# Patient Record
Sex: FEMALE | Race: BLACK OR AFRICAN AMERICAN | Employment: FULL TIME | URBAN - METROPOLITAN AREA
[De-identification: names, ages, dates, MRNs, and addresses within clinical notes are randomized per-mention and may not be internally consistent; named-entity substitution may affect disease eponyms.]

---

## 2017-12-30 ENCOUNTER — APPOINTMENT (OUTPATIENT)
Dept: CT IMAGING | Age: 42
End: 2017-12-30
Attending: EMERGENCY MEDICINE
Payer: COMMERCIAL

## 2017-12-30 ENCOUNTER — HOSPITAL ENCOUNTER (EMERGENCY)
Age: 42
Discharge: HOME | End: 2017-12-30
Attending: EMERGENCY MEDICINE
Payer: COMMERCIAL

## 2017-12-30 VITALS
HEIGHT: 64 IN | TEMPERATURE: 98 F | WEIGHT: 210 LBS | DIASTOLIC BLOOD PRESSURE: 65 MMHG | RESPIRATION RATE: 18 BRPM | OXYGEN SATURATION: 99 % | SYSTOLIC BLOOD PRESSURE: 129 MMHG | HEART RATE: 74 BPM | BODY MASS INDEX: 35.85 KG/M2

## 2017-12-30 DIAGNOSIS — R51.9 NONINTRACTABLE HEADACHE, UNSPECIFIED CHRONICITY PATTERN, UNSPECIFIED HEADACHE TYPE: Primary | ICD-10-CM

## 2017-12-30 DIAGNOSIS — J01.90 ACUTE SINUSITIS, RECURRENCE NOT SPECIFIED, UNSPECIFIED LOCATION: ICD-10-CM

## 2017-12-30 DIAGNOSIS — S16.1XXA STRAIN OF NECK MUSCLE, INITIAL ENCOUNTER: ICD-10-CM

## 2017-12-30 PROCEDURE — 99284 EMERGENCY DEPT VISIT MOD MDM: CPT

## 2017-12-30 PROCEDURE — 72125 CT NECK SPINE W/O DYE: CPT | Performed by: EMERGENCY MEDICINE

## 2017-12-30 PROCEDURE — 70450 CT HEAD/BRAIN W/O DYE: CPT | Performed by: EMERGENCY MEDICINE

## 2017-12-30 PROCEDURE — 96372 THER/PROPH/DIAG INJ SC/IM: CPT

## 2017-12-30 RX ORDER — CYCLOBENZAPRINE HCL 10 MG
10 TABLET ORAL 3 TIMES DAILY PRN
Qty: 20 TABLET | Refills: 0 | Status: SHIPPED | OUTPATIENT
Start: 2017-12-30 | End: 2018-01-06

## 2017-12-30 RX ORDER — KETOROLAC TROMETHAMINE 30 MG/ML
60 INJECTION, SOLUTION INTRAMUSCULAR; INTRAVENOUS ONCE
Status: COMPLETED | OUTPATIENT
Start: 2017-12-30 | End: 2017-12-30

## 2017-12-30 RX ORDER — FLUTICASONE PROPIONATE 50 MCG
2 SPRAY, SUSPENSION (ML) NASAL DAILY
Qty: 16 G | Refills: 0 | Status: SHIPPED | OUTPATIENT
Start: 2017-12-30 | End: 2018-01-29

## 2017-12-30 RX ORDER — TRAMADOL HYDROCHLORIDE 50 MG/1
50 TABLET ORAL EVERY 4 HOURS PRN
Qty: 15 TABLET | Refills: 0 | Status: SHIPPED | OUTPATIENT
Start: 2017-12-30 | End: 2018-01-06

## 2017-12-30 RX ORDER — AMOXICILLIN 500 MG/1
500 TABLET, FILM COATED ORAL 3 TIMES DAILY
Qty: 30 TABLET | Refills: 0 | Status: SHIPPED | OUTPATIENT
Start: 2017-12-30 | End: 2018-01-09

## 2017-12-30 NOTE — ED INITIAL ASSESSMENT (HPI)
Pt reports headache for one day with sinus congestion/pressure for 2 days with intermittent dizziness.

## 2017-12-30 NOTE — ED PROVIDER NOTES
Patient Seen in: THE [facility] Emergency Department In [place]    History   Patient presents with:  Headache (neurologic)    Stated Complaint:     HPI    This is a 64-year-old female who arrives here with complaints of left-sided facial, headache.   The patie HPI.  Constitutional and vital signs reviewed. All other systems reviewed and negative except as noted above.     Physical Exam   ED Triage Vitals [12/30/17 5533]  BP: 132/72  Pulse: 91  Resp: 20  Temp: 97.8 °F (36.6 °C)  Temp src: Temporal  SpO2: 96 % Or Head (58311)    Result Date: 12/30/2017  PROCEDURE:  CT BRAIN OR HEAD (37754)  COMPARISON:  None. INDICATIONS:  ha  TECHNIQUE:  Noncontrast CT scanning is performed through the brain. Dose reduction techniques were used.  Dose information is transmitted C2-C3:  No significant disc/facet abnormality, spinal stenosis, or foraminal stenosis. C3-C4:  No significant disc/facet abnormality, spinal stenosis, or foraminal stenosis. C4-C5:  Uncinate hypertrophy with mild right neural foraminal narrowing.  C5-C6:  N MCG/ACT Nasal Suspension  2 sprays by Nasal route daily. , Print Script, Disp-16 g, R-0    TraMADol HCl 50 MG Oral Tab  Take 1 tablet (50 mg total) by mouth every 4 (four) hours as needed for Pain., Print Script, Disp-15 tablet, R-0    Cyclobenzaprine HCl 1

## 2018-02-17 ENCOUNTER — APPOINTMENT (OUTPATIENT)
Dept: CT IMAGING | Age: 43
End: 2018-02-17
Attending: EMERGENCY MEDICINE
Payer: COMMERCIAL

## 2018-02-17 ENCOUNTER — HOSPITAL ENCOUNTER (EMERGENCY)
Age: 43
Discharge: HOME | End: 2018-02-17
Attending: EMERGENCY MEDICINE
Payer: COMMERCIAL

## 2018-02-17 VITALS
WEIGHT: 220 LBS | OXYGEN SATURATION: 100 % | HEIGHT: 64 IN | DIASTOLIC BLOOD PRESSURE: 70 MMHG | TEMPERATURE: 99 F | BODY MASS INDEX: 37.56 KG/M2 | RESPIRATION RATE: 18 BRPM | HEART RATE: 72 BPM | SYSTOLIC BLOOD PRESSURE: 124 MMHG

## 2018-02-17 DIAGNOSIS — R51.9 NONINTRACTABLE HEADACHE, UNSPECIFIED CHRONICITY PATTERN, UNSPECIFIED HEADACHE TYPE: Primary | ICD-10-CM

## 2018-02-17 LAB
ALBUMIN SERPL-MCNC: 3.9 G/DL (ref 3.5–4.8)
ALP LIVER SERPL-CCNC: 65 U/L (ref 37–98)
ALT SERPL-CCNC: 24 U/L (ref 14–54)
AST SERPL-CCNC: 18 U/L (ref 15–41)
BASOPHILS # BLD AUTO: 0.06 X10(3) UL (ref 0–0.1)
BASOPHILS NFR BLD AUTO: 1 %
BILIRUB SERPL-MCNC: 0.5 MG/DL (ref 0.1–2)
BUN BLD-MCNC: 11 MG/DL (ref 8–20)
CALCIUM BLD-MCNC: 9.1 MG/DL (ref 8.3–10.3)
CHLORIDE: 104 MMOL/L (ref 101–111)
CO2: 29 MMOL/L (ref 22–32)
CREAT BLD-MCNC: 1.13 MG/DL (ref 0.55–1.02)
EOSINOPHIL # BLD AUTO: 0.22 X10(3) UL (ref 0–0.3)
EOSINOPHIL NFR BLD AUTO: 3.5 %
ERYTHROCYTE [DISTWIDTH] IN BLOOD BY AUTOMATED COUNT: 14 % (ref 11.5–16)
GLUCOSE BLD-MCNC: 115 MG/DL (ref 70–99)
HCT VFR BLD AUTO: 42.7 % (ref 34–50)
HGB BLD-MCNC: 14.4 G/DL (ref 12–16)
IMMATURE GRANULOCYTE COUNT: 0.01 X10(3) UL (ref 0–1)
IMMATURE GRANULOCYTE RATIO %: 0.2 %
LYMPHOCYTES # BLD AUTO: 2.14 X10(3) UL (ref 0.9–4)
LYMPHOCYTES NFR BLD AUTO: 34.4 %
M PROTEIN MFR SERPL ELPH: 7.9 G/DL (ref 6.1–8.3)
MCH RBC QN AUTO: 30.7 PG (ref 27–33.2)
MCHC RBC AUTO-ENTMCNC: 33.7 G/DL (ref 31–37)
MCV RBC AUTO: 91 FL (ref 81–100)
MONOCYTES # BLD AUTO: 0.35 X10(3) UL (ref 0.1–1)
MONOCYTES NFR BLD AUTO: 5.6 %
NEUTROPHIL ABS PRELIM: 3.44 X10 (3) UL (ref 1.3–6.7)
NEUTROPHILS # BLD AUTO: 3.44 X10(3) UL (ref 1.3–6.7)
NEUTROPHILS NFR BLD AUTO: 55.3 %
PLATELET # BLD AUTO: 272 10(3)UL (ref 150–450)
POTASSIUM SERPL-SCNC: 4.3 MMOL/L (ref 3.6–5.1)
RBC # BLD AUTO: 4.69 X10(6)UL (ref 3.8–5.1)
RED CELL DISTRIBUTION WIDTH-SD: 46.6 FL (ref 35.1–46.3)
SODIUM SERPL-SCNC: 138 MMOL/L (ref 136–144)
WBC # BLD AUTO: 6.2 X10(3) UL (ref 4–13)

## 2018-02-17 PROCEDURE — 80053 COMPREHEN METABOLIC PANEL: CPT | Performed by: EMERGENCY MEDICINE

## 2018-02-17 PROCEDURE — 96361 HYDRATE IV INFUSION ADD-ON: CPT

## 2018-02-17 PROCEDURE — 99284 EMERGENCY DEPT VISIT MOD MDM: CPT

## 2018-02-17 PROCEDURE — 96375 TX/PRO/DX INJ NEW DRUG ADDON: CPT

## 2018-02-17 PROCEDURE — 85025 COMPLETE CBC W/AUTO DIFF WBC: CPT | Performed by: EMERGENCY MEDICINE

## 2018-02-17 PROCEDURE — 70496 CT ANGIOGRAPHY HEAD: CPT | Performed by: EMERGENCY MEDICINE

## 2018-02-17 PROCEDURE — 96374 THER/PROPH/DIAG INJ IV PUSH: CPT

## 2018-02-17 RX ORDER — DIPHENHYDRAMINE HYDROCHLORIDE 50 MG/ML
25 INJECTION INTRAMUSCULAR; INTRAVENOUS ONCE
Status: COMPLETED | OUTPATIENT
Start: 2018-02-17 | End: 2018-02-17

## 2018-02-17 RX ORDER — KETOROLAC TROMETHAMINE 30 MG/ML
30 INJECTION, SOLUTION INTRAMUSCULAR; INTRAVENOUS ONCE
Status: COMPLETED | OUTPATIENT
Start: 2018-02-17 | End: 2018-02-17

## 2018-02-17 RX ORDER — ONDANSETRON 2 MG/ML
4 INJECTION INTRAMUSCULAR; INTRAVENOUS ONCE
Status: COMPLETED | OUTPATIENT
Start: 2018-02-17 | End: 2018-02-17

## 2018-02-17 RX ORDER — METOCLOPRAMIDE 10 MG/1
10 TABLET ORAL 3 TIMES DAILY PRN
Qty: 20 TABLET | Refills: 0 | Status: SHIPPED | OUTPATIENT
Start: 2018-02-17 | End: 2018-03-19

## 2018-02-17 NOTE — ED PROVIDER NOTES
Patient Seen in: [facility] In [place]    History   Patient presents with:  Headache (neurologic)    Stated Complaint: migraine headache x 3 days/lightheaded    HPI    This is a 26-year-old female who arrives here with complaints of a (Temporal)   Resp 20   Ht 162.6 cm (5' 4\")   Wt 99.8 kg   LMP 09/20/2010   SpO2 100%   BMI 37.76 kg/m²         Physical Exam  General: Patient's got photophobia her neck is supple cranial nerves are grossly intact.     The patient is in no respiratory dist monitors, IV was started, blood was drawn. She states her family history is significant for 2 aunts with aneurysms. But this did not come on as a sudden onset was carries headache of her life.      Cta Brain (cpt=70496)    Result Date: 2/17/2018  [facility] back and reexamined the patient the headache is significantly better she does have a little bit of photophobia but neurologically intact I discussed with her that it could be a migraine component as she does have some photophobia she has no meningismus alexander

## 2018-02-17 NOTE — ED INITIAL ASSESSMENT (HPI)
Pt here c/o migraine h/a for last 3 days. Has taken advil yesterday with some relief. Sensitive to light. Nauseated/lightheaded. Denies vomiting.

## 2018-04-27 ENCOUNTER — HOSPITAL ENCOUNTER (EMERGENCY)
Age: 43
Discharge: HOME | End: 2018-04-27
Payer: COMMERCIAL

## 2018-04-27 VITALS
HEART RATE: 92 BPM | RESPIRATION RATE: 18 BRPM | BODY MASS INDEX: 37.56 KG/M2 | SYSTOLIC BLOOD PRESSURE: 127 MMHG | HEIGHT: 64 IN | DIASTOLIC BLOOD PRESSURE: 72 MMHG | WEIGHT: 220 LBS | OXYGEN SATURATION: 97 %

## 2018-04-27 DIAGNOSIS — L23.9 ALLERGIC DERMATITIS: Primary | ICD-10-CM

## 2018-04-27 PROCEDURE — 99283 EMERGENCY DEPT VISIT LOW MDM: CPT

## 2018-04-27 RX ORDER — HYDROXYZINE HYDROCHLORIDE 25 MG/1
TABLET, FILM COATED ORAL EVERY 4 HOURS PRN
Qty: 20 TABLET | Refills: 0 | Status: SHIPPED | OUTPATIENT
Start: 2018-04-27 | End: 2018-05-27

## 2018-04-27 RX ORDER — METHYLPREDNISOLONE 4 MG/1
TABLET ORAL
Qty: 1 PACKAGE | Refills: 0 | Status: SHIPPED | OUTPATIENT
Start: 2018-04-27 | End: 2018-05-02

## 2018-04-27 RX ORDER — BUDESONIDE AND FORMOTEROL FUMARATE DIHYDRATE 80; 4.5 UG/1; UG/1
2 AEROSOL RESPIRATORY (INHALATION)
COMMUNITY
Start: 2017-04-21 | End: 2022-01-24 | Stop reason: ALTCHOICE

## 2018-04-27 RX ORDER — HYDROXYZINE HYDROCHLORIDE 25 MG/1
25 TABLET, FILM COATED ORAL ONCE
Status: COMPLETED | OUTPATIENT
Start: 2018-04-27 | End: 2018-04-27

## 2018-04-27 RX ORDER — CHLORTHALIDONE 25 MG/1
25 TABLET ORAL
COMMUNITY
Start: 2018-03-22

## 2018-04-28 NOTE — ED PROVIDER NOTES
Patient Seen in: [facility] In [place]    History   Patient presents with:  Rash Skin Problem (integumentary)    Stated Complaint: \"breaking out and itching\" for a few months    40-year-old -American female with a history of skin.  Excoriations are noted. There are no vesicles erythema edema or ecchymosis. No evidence of secondary bacterial infection. No rash at the hands. Psychiatric: She has a normal mood and affect.        ED Course   Labs Reviewed - No data to display

## 2018-04-28 NOTE — ED INITIAL ASSESSMENT (HPI)
Pt to the ED for evaluation of 3-4 months of intermittent rash and itching. PT denies any new medications, foods, detergents or soaps.

## 2018-06-17 NOTE — LETTER
Date & Time: 8/7/2018, 11:25 PM  Patient: Roopa Oliver  Encounter Provider(s):    Ethel Shipman MD       To Whom It May Concern:    Marilou Lawton was seen and treated in our department on 8/7/2018. She should not return to work until 08/10/2018. nonaffiliated

## 2018-08-07 ENCOUNTER — APPOINTMENT (OUTPATIENT)
Dept: GENERAL RADIOLOGY | Age: 43
End: 2018-08-07
Attending: EMERGENCY MEDICINE
Payer: COMMERCIAL

## 2018-08-07 ENCOUNTER — APPOINTMENT (OUTPATIENT)
Dept: CT IMAGING | Age: 43
End: 2018-08-07
Attending: EMERGENCY MEDICINE
Payer: COMMERCIAL

## 2018-08-07 ENCOUNTER — HOSPITAL ENCOUNTER (EMERGENCY)
Age: 43
Discharge: HOME | End: 2018-08-07
Attending: EMERGENCY MEDICINE
Payer: COMMERCIAL

## 2018-08-07 VITALS
SYSTOLIC BLOOD PRESSURE: 122 MMHG | BODY MASS INDEX: 36.7 KG/M2 | TEMPERATURE: 98 F | RESPIRATION RATE: 16 BRPM | DIASTOLIC BLOOD PRESSURE: 75 MMHG | OXYGEN SATURATION: 97 % | HEART RATE: 71 BPM | WEIGHT: 215 LBS | HEIGHT: 64 IN

## 2018-08-07 DIAGNOSIS — R07.9 CHEST PAIN OF UNCERTAIN ETIOLOGY: Primary | ICD-10-CM

## 2018-08-07 LAB
ALBUMIN SERPL-MCNC: 3.8 G/DL (ref 3.5–4.8)
ALBUMIN/GLOB SERPL: 1 {RATIO} (ref 1–2)
ALP LIVER SERPL-CCNC: 60 U/L (ref 37–98)
ALT SERPL-CCNC: 24 U/L (ref 14–54)
ANION GAP SERPL CALC-SCNC: 7 MMOL/L (ref 0–18)
AST SERPL-CCNC: 18 U/L (ref 15–41)
BASOPHILS # BLD AUTO: 0.05 X10(3) UL (ref 0–0.1)
BASOPHILS NFR BLD AUTO: 0.7 %
BILIRUB SERPL-MCNC: 0.2 MG/DL (ref 0.1–2)
BUN BLD-MCNC: 11 MG/DL (ref 8–20)
BUN/CREAT SERPL: 12.1 (ref 10–20)
CALCIUM BLD-MCNC: 8.7 MG/DL (ref 8.3–10.3)
CHLORIDE SERPL-SCNC: 107 MMOL/L (ref 101–111)
CO2 SERPL-SCNC: 25 MMOL/L (ref 22–32)
CREAT BLD-MCNC: 0.91 MG/DL (ref 0.55–1.02)
D-DIMER: 0.92 UG/ML FEU (ref 0–0.49)
EOSINOPHIL # BLD AUTO: 0.4 X10(3) UL (ref 0–0.3)
EOSINOPHIL NFR BLD AUTO: 5.8 %
ERYTHROCYTE [DISTWIDTH] IN BLOOD BY AUTOMATED COUNT: 13.4 % (ref 11.5–16)
GLOBULIN PLAS-MCNC: 3.9 G/DL (ref 2.5–3.7)
GLUCOSE BLD-MCNC: 83 MG/DL (ref 70–99)
HCT VFR BLD AUTO: 41.5 % (ref 34–50)
HGB BLD-MCNC: 13.8 G/DL (ref 12–16)
IMMATURE GRANULOCYTE COUNT: 0.01 X10(3) UL (ref 0–1)
IMMATURE GRANULOCYTE RATIO %: 0.1 %
LYMPHOCYTES # BLD AUTO: 2.96 X10(3) UL (ref 0.9–4)
LYMPHOCYTES NFR BLD AUTO: 43.3 %
M PROTEIN MFR SERPL ELPH: 7.7 G/DL (ref 6.1–8.3)
MCH RBC QN AUTO: 30.1 PG (ref 27–33.2)
MCHC RBC AUTO-ENTMCNC: 33.3 G/DL (ref 31–37)
MCV RBC AUTO: 90.6 FL (ref 81–100)
MONOCYTES # BLD AUTO: 0.38 X10(3) UL (ref 0.1–1)
MONOCYTES NFR BLD AUTO: 5.6 %
NEUTROPHIL ABS PRELIM: 3.04 X10 (3) UL (ref 1.3–6.7)
NEUTROPHILS # BLD AUTO: 3.04 X10(3) UL (ref 1.3–6.7)
NEUTROPHILS NFR BLD AUTO: 44.5 %
OSMOLALITY SERPL CALC.SUM OF ELEC: 287 MOSM/KG (ref 275–295)
PLATELET # BLD AUTO: 218 10(3)UL (ref 150–450)
POTASSIUM SERPL-SCNC: 3.7 MMOL/L (ref 3.6–5.1)
RBC # BLD AUTO: 4.58 X10(6)UL (ref 3.8–5.1)
RED CELL DISTRIBUTION WIDTH-SD: 44.8 FL (ref 35.1–46.3)
SODIUM SERPL-SCNC: 139 MMOL/L (ref 136–144)
TROPONIN I SERPL-MCNC: <0.046 NG/ML (ref ?–0.05)
TROPONIN I SERPL-MCNC: <0.046 NG/ML (ref ?–0.05)
WBC # BLD AUTO: 6.8 X10(3) UL (ref 4–13)

## 2018-08-07 PROCEDURE — 36415 COLL VENOUS BLD VENIPUNCTURE: CPT

## 2018-08-07 PROCEDURE — 99285 EMERGENCY DEPT VISIT HI MDM: CPT

## 2018-08-07 PROCEDURE — 93010 ELECTROCARDIOGRAM REPORT: CPT

## 2018-08-07 PROCEDURE — 84484 ASSAY OF TROPONIN QUANT: CPT | Performed by: EMERGENCY MEDICINE

## 2018-08-07 PROCEDURE — 93005 ELECTROCARDIOGRAM TRACING: CPT

## 2018-08-07 PROCEDURE — 71045 X-RAY EXAM CHEST 1 VIEW: CPT | Performed by: EMERGENCY MEDICINE

## 2018-08-07 PROCEDURE — 80053 COMPREHEN METABOLIC PANEL: CPT | Performed by: EMERGENCY MEDICINE

## 2018-08-07 PROCEDURE — 85378 FIBRIN DEGRADE SEMIQUANT: CPT | Performed by: EMERGENCY MEDICINE

## 2018-08-07 PROCEDURE — 71275 CT ANGIOGRAPHY CHEST: CPT | Performed by: EMERGENCY MEDICINE

## 2018-08-07 PROCEDURE — 70450 CT HEAD/BRAIN W/O DYE: CPT | Performed by: EMERGENCY MEDICINE

## 2018-08-07 PROCEDURE — 85025 COMPLETE CBC W/AUTO DIFF WBC: CPT | Performed by: EMERGENCY MEDICINE

## 2018-08-07 RX ORDER — ACETAMINOPHEN 500 MG
1000 TABLET ORAL ONCE
Status: COMPLETED | OUTPATIENT
Start: 2018-08-07 | End: 2018-08-07

## 2018-08-07 RX ORDER — NITROGLYCERIN 0.4 MG/1
0.4 TABLET SUBLINGUAL ONCE
Status: COMPLETED | OUTPATIENT
Start: 2018-08-07 | End: 2018-08-07

## 2018-08-08 LAB
ATRIAL RATE: 63 BPM
ATRIAL RATE: 70 BPM
P AXIS: 35 DEGREES
P AXIS: 38 DEGREES
P-R INTERVAL: 164 MS
P-R INTERVAL: 170 MS
Q-T INTERVAL: 410 MS
Q-T INTERVAL: 450 MS
QRS DURATION: 70 MS
QRS DURATION: 74 MS
QTC CALCULATION (BEZET): 442 MS
QTC CALCULATION (BEZET): 460 MS
R AXIS: 10 DEGREES
R AXIS: 17 DEGREES
T AXIS: 31 DEGREES
T AXIS: 31 DEGREES
VENTRICULAR RATE: 63 BPM
VENTRICULAR RATE: 70 BPM

## 2018-08-08 NOTE — ED PROVIDER NOTES
Patient Seen in: [facility] Emergency Department In [place]    History   Patient presents with:  Chest Pain Angina (cardiovascular)    Stated Complaint: chest pain     HPI    49-year-old with a history of asthma and hypertension presents for evaluation of Vitals [08/07/18 1953]  BP: (!) 149/91  Pulse: 76  Resp: 18  Temp: 97.8 °F (36.6 °C)  Temp src: Oral  SpO2: 99 %  O2 Device: None (Room air)    Current:/82   Pulse 94   Temp 97.8 °F (36.6 °C) (Oral)   Resp 11   Ht 162.6 cm (5' 4\")   Wt 97.5 kg   LMP thromboembolism when the D-Dimer is greater than 0.50 ug/mL (FEU). Proceed with caution from clinical presentation.    CBC W/ DIFFERENTIAL - Abnormal; Notable for the following:     Eosinophil Absolute 0.40 (*)     All other components within normal daniel symptoms less than 6 months ago at [facility]. Given this I do feel she can be discharged home but do feel she should have close outpatient follow-up with cardiology. She would like to follow-up with a cardiologist in this area and said going back to [facility].

## 2018-08-09 ENCOUNTER — PRIOR ORIGINAL RECORDS (OUTPATIENT)
Dept: OTHER | Age: 43
End: 2018-08-09

## 2018-08-14 ENCOUNTER — LAB ENCOUNTER (OUTPATIENT)
Dept: LAB | Age: 43
End: 2018-08-14
Attending: INTERNAL MEDICINE
Payer: COMMERCIAL

## 2018-08-14 ENCOUNTER — HOSPITAL ENCOUNTER (OUTPATIENT)
Dept: CV DIAGNOSTICS | Age: 43
Discharge: HOME | End: 2018-08-14
Attending: INTERNAL MEDICINE
Payer: COMMERCIAL

## 2018-08-14 DIAGNOSIS — E66.9 OBESITY: Primary | ICD-10-CM

## 2018-08-14 DIAGNOSIS — Z13.220 SCREENING CHOLESTEROL LEVEL: ICD-10-CM

## 2018-08-14 LAB
CHOLEST SMN-MCNC: 164 MG/DL (ref ?–200)
HDLC SERPL-MCNC: 46 MG/DL (ref 40–59)
LDLC SERPL CALC-MCNC: 97 MG/DL (ref ?–100)
NONHDLC SERPL-MCNC: 118 MG/DL (ref ?–130)
TRIGL SERPL-MCNC: 104 MG/DL (ref 30–149)
VLDLC SERPL CALC-MCNC: 21 MG/DL (ref 0–30)

## 2018-08-14 PROCEDURE — 80061 LIPID PANEL: CPT

## 2018-08-14 PROCEDURE — 36415 COLL VENOUS BLD VENIPUNCTURE: CPT

## 2018-08-27 ENCOUNTER — HOSPITAL ENCOUNTER (OUTPATIENT)
Dept: CV DIAGNOSTICS | Facility: HOSPITAL | Age: 43
Discharge: HOME | End: 2018-08-27
Attending: INTERNAL MEDICINE

## 2018-08-27 ENCOUNTER — MYAURORA ACCOUNT LINK (OUTPATIENT)
Dept: OTHER | Age: 43
End: 2018-08-27

## 2018-08-27 DIAGNOSIS — R42 DIZZINESS: ICD-10-CM

## 2018-08-27 DIAGNOSIS — R07.9 CHEST PAIN, UNSPECIFIED TYPE: ICD-10-CM

## 2018-08-28 ENCOUNTER — PRIOR ORIGINAL RECORDS (OUTPATIENT)
Dept: OTHER | Age: 43
End: 2018-08-28

## 2018-08-31 ENCOUNTER — PRIOR ORIGINAL RECORDS (OUTPATIENT)
Dept: OTHER | Age: 43
End: 2018-08-31

## 2018-08-31 ENCOUNTER — HOSPITAL ENCOUNTER (OUTPATIENT)
Dept: CT IMAGING | Facility: HOSPITAL | Age: 43
Discharge: HOME | End: 2018-08-31
Attending: INTERNAL MEDICINE
Payer: COMMERCIAL

## 2018-08-31 VITALS
HEART RATE: 71 BPM | DIASTOLIC BLOOD PRESSURE: 68 MMHG | OXYGEN SATURATION: 96 % | SYSTOLIC BLOOD PRESSURE: 103 MMHG | RESPIRATION RATE: 16 BRPM

## 2018-08-31 DIAGNOSIS — R07.9 CHEST PAIN: ICD-10-CM

## 2018-08-31 PROCEDURE — 82565 ASSAY OF CREATININE: CPT

## 2018-08-31 PROCEDURE — 75574 CT ANGIO HRT W/3D IMAGE: CPT | Performed by: INTERNAL MEDICINE

## 2018-08-31 RX ORDER — DILTIAZEM HYDROCHLORIDE 5 MG/ML
5 INJECTION INTRAVENOUS
Status: COMPLETED | OUTPATIENT
Start: 2018-08-31 | End: 2018-08-31

## 2018-08-31 RX ORDER — METOPROLOL TARTRATE 5 MG/5ML
INJECTION INTRAVENOUS
Status: COMPLETED
Start: 2018-08-31 | End: 2018-08-31

## 2018-08-31 RX ORDER — NITROGLYCERIN 0.4 MG/1
TABLET SUBLINGUAL
Status: COMPLETED
Start: 2018-08-31 | End: 2018-08-31

## 2018-08-31 RX ORDER — NITROGLYCERIN 0.4 MG/1
0.4 TABLET SUBLINGUAL ONCE
Status: COMPLETED | OUTPATIENT
Start: 2018-08-31 | End: 2018-08-31

## 2018-08-31 RX ORDER — DILTIAZEM HYDROCHLORIDE 5 MG/ML
INJECTION INTRAVENOUS
Status: COMPLETED
Start: 2018-08-31 | End: 2018-08-31

## 2018-08-31 RX ORDER — METOPROLOL TARTRATE 5 MG/5ML
5 INJECTION INTRAVENOUS
Status: COMPLETED | OUTPATIENT
Start: 2018-08-31 | End: 2018-08-31

## 2018-08-31 RX ADMIN — METOPROLOL TARTRATE 5 MG: 5 INJECTION INTRAVENOUS at 13:30:00

## 2018-08-31 RX ADMIN — DILTIAZEM HYDROCHLORIDE 5 MG: 5 INJECTION INTRAVENOUS at 13:48:00

## 2018-08-31 RX ADMIN — METOPROLOL TARTRATE 5 MG: 5 INJECTION INTRAVENOUS at 13:40:00

## 2018-08-31 RX ADMIN — NITROGLYCERIN 0.4 MG: 0.4 TABLET SUBLINGUAL at 14:19:00

## 2018-08-31 RX ADMIN — METOPROLOL TARTRATE 5 MG: 5 INJECTION INTRAVENOUS at 13:25:00

## 2018-08-31 RX ADMIN — METOPROLOL TARTRATE 5 MG: 5 INJECTION INTRAVENOUS at 13:35:00

## 2018-08-31 RX ADMIN — DILTIAZEM HYDROCHLORIDE 5 MG: 5 INJECTION INTRAVENOUS at 13:53:00

## 2018-08-31 RX ADMIN — DILTIAZEM HYDROCHLORIDE 5 MG: 5 INJECTION INTRAVENOUS at 13:58:00

## 2018-08-31 RX ADMIN — DILTIAZEM HYDROCHLORIDE 5 MG: 5 INJECTION INTRAVENOUS at 14:08:00

## 2018-08-31 RX ADMIN — DILTIAZEM HYDROCHLORIDE 5 MG: 5 INJECTION INTRAVENOUS at 14:03:00

## 2018-08-31 NOTE — IMAGING NOTE
Pt c/o headache after NTG was administered. She states she had NTG previously and had the same effect.  Pt states she felt \"woozy\" after taking Metoprolol 25mg po this am-advised to inform ordering MD.   Pt ambulated to holding room w/o assistance or diff

## 2018-09-04 LAB — CREAT SERPL-MCNC: 1.1 MG/DL (ref 0.55–1.02)

## 2018-09-06 LAB — UFCT: 0 CA SCORE

## 2018-09-10 ENCOUNTER — MYAURORA ACCOUNT LINK (OUTPATIENT)
Dept: OTHER | Age: 43
End: 2018-09-10

## 2018-09-10 ENCOUNTER — PRIOR ORIGINAL RECORDS (OUTPATIENT)
Dept: OTHER | Age: 43
End: 2018-09-10

## 2018-12-08 ENCOUNTER — HOSPITAL ENCOUNTER (EMERGENCY)
Facility: HOSPITAL | Age: 43
Discharge: HOME | End: 2018-12-08
Attending: EMERGENCY MEDICINE
Payer: COMMERCIAL

## 2018-12-08 VITALS
SYSTOLIC BLOOD PRESSURE: 133 MMHG | TEMPERATURE: 99 F | HEART RATE: 77 BPM | DIASTOLIC BLOOD PRESSURE: 83 MMHG | OXYGEN SATURATION: 99 % | RESPIRATION RATE: 16 BRPM | HEIGHT: 64 IN | WEIGHT: 250 LBS | BODY MASS INDEX: 42.68 KG/M2

## 2018-12-08 DIAGNOSIS — G43.809 OTHER MIGRAINE WITHOUT STATUS MIGRAINOSUS, NOT INTRACTABLE: Primary | ICD-10-CM

## 2018-12-08 PROCEDURE — 99284 EMERGENCY DEPT VISIT MOD MDM: CPT | Performed by: EMERGENCY MEDICINE

## 2018-12-08 PROCEDURE — 96375 TX/PRO/DX INJ NEW DRUG ADDON: CPT | Performed by: EMERGENCY MEDICINE

## 2018-12-08 PROCEDURE — 96365 THER/PROPH/DIAG IV INF INIT: CPT | Performed by: EMERGENCY MEDICINE

## 2018-12-08 RX ORDER — KETOROLAC TROMETHAMINE 30 MG/ML
30 INJECTION, SOLUTION INTRAMUSCULAR; INTRAVENOUS ONCE
Status: COMPLETED | OUTPATIENT
Start: 2018-12-08 | End: 2018-12-08

## 2018-12-08 RX ORDER — DIPHENHYDRAMINE HYDROCHLORIDE 50 MG/ML
50 INJECTION INTRAMUSCULAR; INTRAVENOUS ONCE
Status: COMPLETED | OUTPATIENT
Start: 2018-12-08 | End: 2018-12-08

## 2018-12-08 RX ORDER — MORPHINE SULFATE 4 MG/ML
4 INJECTION, SOLUTION INTRAMUSCULAR; INTRAVENOUS EVERY 30 MIN PRN
Status: DISCONTINUED | OUTPATIENT
Start: 2018-12-08 | End: 2018-12-08

## 2018-12-08 RX ORDER — METOCLOPRAMIDE HYDROCHLORIDE 5 MG/ML
10 INJECTION INTRAMUSCULAR; INTRAVENOUS ONCE
Status: COMPLETED | OUTPATIENT
Start: 2018-12-08 | End: 2018-12-08

## 2018-12-08 NOTE — ED NOTES
Pt ambulated to bathroom with assistance from family; tolerated well. IVPB remains infusing at R ac. Pt denies nausea. States she now feels \"loopy\".

## 2018-12-08 NOTE — ED PROVIDER NOTES
Patient Seen in: [facility] Emergency Department    History   Patient presents with:  Headache (neurologic)    Stated Complaint: PT TO ER VIA EMS FROM HOME  STATES HA X TWO HRS AFTER STRESSORS    HPI    Patient with long history of recurrent migrain Course   Labs Reviewed - No data to display  Feeling much better after analgesics administered. Discharged home in stable condition to follow-up if further problems occur.         MDM               Disposition and Plan     Clinical Impression:  Migraine he

## 2019-02-28 VITALS
WEIGHT: 218 LBS | HEART RATE: 73 BPM | SYSTOLIC BLOOD PRESSURE: 120 MMHG | HEIGHT: 65 IN | BODY MASS INDEX: 36.32 KG/M2 | DIASTOLIC BLOOD PRESSURE: 82 MMHG

## 2019-02-28 VITALS
HEIGHT: 65 IN | HEART RATE: 76 BPM | BODY MASS INDEX: 36.65 KG/M2 | DIASTOLIC BLOOD PRESSURE: 72 MMHG | WEIGHT: 220 LBS | SYSTOLIC BLOOD PRESSURE: 114 MMHG

## 2019-03-05 RX ORDER — CHLORTHALIDONE 25 MG/1
1 TABLET ORAL DAILY
COMMUNITY
Start: 2018-08-09

## 2019-03-05 RX ORDER — MONTELUKAST SODIUM 10 MG/1
1 TABLET ORAL DAILY
COMMUNITY
Start: 2018-08-09

## 2019-03-05 RX ORDER — LOSARTAN POTASSIUM 25 MG/1
1 TABLET ORAL DAILY
COMMUNITY
Start: 2018-08-09 | End: 2019-05-10 | Stop reason: SDUPTHER

## 2019-03-05 RX ORDER — ALBUTEROL SULFATE 90 UG/1
AEROSOL, METERED RESPIRATORY (INHALATION)
COMMUNITY
Start: 2018-08-09

## 2019-03-05 RX ORDER — OMEPRAZOLE 20 MG/1
1 CAPSULE, DELAYED RELEASE ORAL DAILY
COMMUNITY
Start: 2018-08-09

## 2019-05-15 RX ORDER — LOSARTAN POTASSIUM 25 MG/1
TABLET ORAL
Qty: 90 TABLET | Refills: 0 | Status: SHIPPED | OUTPATIENT
Start: 2019-05-15

## 2019-12-08 ENCOUNTER — APPOINTMENT (OUTPATIENT)
Dept: ULTRASOUND IMAGING | Age: 44
End: 2019-12-08
Attending: PHYSICIAN ASSISTANT
Payer: COMMERCIAL

## 2019-12-08 ENCOUNTER — HOSPITAL ENCOUNTER (EMERGENCY)
Age: 44
Discharge: HOME | End: 2019-12-08
Attending: EMERGENCY MEDICINE
Payer: COMMERCIAL

## 2019-12-08 VITALS
SYSTOLIC BLOOD PRESSURE: 149 MMHG | TEMPERATURE: 98 F | HEIGHT: 64 IN | WEIGHT: 225 LBS | BODY MASS INDEX: 38.41 KG/M2 | RESPIRATION RATE: 18 BRPM | OXYGEN SATURATION: 99 % | DIASTOLIC BLOOD PRESSURE: 67 MMHG | HEART RATE: 72 BPM

## 2019-12-08 DIAGNOSIS — R19.7 DIARRHEA, UNSPECIFIED TYPE: Primary | ICD-10-CM

## 2019-12-08 DIAGNOSIS — R10.9 ABDOMINAL PAIN OF UNKNOWN ETIOLOGY: ICD-10-CM

## 2019-12-08 PROCEDURE — 76700 US EXAM ABDOM COMPLETE: CPT | Performed by: PHYSICIAN ASSISTANT

## 2019-12-08 PROCEDURE — 99284 EMERGENCY DEPT VISIT MOD MDM: CPT

## 2019-12-08 PROCEDURE — 83690 ASSAY OF LIPASE: CPT | Performed by: PHYSICIAN ASSISTANT

## 2019-12-08 PROCEDURE — 96374 THER/PROPH/DIAG INJ IV PUSH: CPT

## 2019-12-08 PROCEDURE — 80053 COMPREHEN METABOLIC PANEL: CPT | Performed by: PHYSICIAN ASSISTANT

## 2019-12-08 PROCEDURE — 96361 HYDRATE IV INFUSION ADD-ON: CPT

## 2019-12-08 PROCEDURE — 85025 COMPLETE CBC W/AUTO DIFF WBC: CPT | Performed by: PHYSICIAN ASSISTANT

## 2019-12-08 RX ORDER — LOSARTAN POTASSIUM 50 MG/1
TABLET ORAL DAILY
COMMUNITY
End: 2021-12-01

## 2019-12-08 RX ORDER — PANTOPRAZOLE SODIUM 40 MG/1
40 TABLET, DELAYED RELEASE ORAL DAILY
Qty: 30 TABLET | Refills: 0 | Status: SHIPPED | OUTPATIENT
Start: 2019-12-08 | End: 2020-01-07

## 2019-12-08 RX ORDER — DICYCLOMINE HCL 20 MG
20 TABLET ORAL 4 TIMES DAILY PRN
Qty: 30 TABLET | Refills: 0 | Status: SHIPPED | OUTPATIENT
Start: 2019-12-08 | End: 2020-01-07

## 2019-12-08 RX ORDER — LIDOCAINE HYDROCHLORIDE 20 MG/ML
10 SOLUTION OROPHARYNGEAL ONCE
Status: COMPLETED | OUTPATIENT
Start: 2019-12-08 | End: 2019-12-08

## 2019-12-08 RX ORDER — ONDANSETRON 2 MG/ML
4 INJECTION INTRAMUSCULAR; INTRAVENOUS ONCE
Status: COMPLETED | OUTPATIENT
Start: 2019-12-08 | End: 2019-12-08

## 2019-12-08 RX ORDER — ONDANSETRON 4 MG/1
4 TABLET, ORALLY DISINTEGRATING ORAL EVERY 4 HOURS PRN
Qty: 10 TABLET | Refills: 0 | Status: SHIPPED | OUTPATIENT
Start: 2019-12-08 | End: 2019-12-15

## 2019-12-08 RX ORDER — PHENOBARBITAL, HYOSCYAMINE SULFATE, ATROPINE SULFATE, SCOPOLAMINE HYDROBROMIDE .0194; .1037; 16.2; .0065 MG/5ML; MG/5ML; MG/5ML; MG/5ML
5 ELIXIR ORAL ONCE
Status: DISCONTINUED | OUTPATIENT
Start: 2019-12-08 | End: 2019-12-08

## 2019-12-08 RX ORDER — MAGNESIUM HYDROXIDE/ALUMINUM HYDROXICE/SIMETHICONE 120; 1200; 1200 MG/30ML; MG/30ML; MG/30ML
30 SUSPENSION ORAL ONCE
Status: COMPLETED | OUTPATIENT
Start: 2019-12-08 | End: 2019-12-08

## 2019-12-08 NOTE — ED INITIAL ASSESSMENT (HPI)
Pt states she has had midepigastric pain since Friday nausea no vomiting no diarrhea. Pt states whatever she eats or drinks comes right out. No fever.

## 2019-12-08 NOTE — ED PROVIDER NOTES
Patient Seen in: [facility] In [place]      History   No chief complaint on file.     Stated Complaint: Abdominal pain    40-year-old obese -American female with history of hypertension presents to the ER today with complaints o heart sounds. No murmur. No friction rub. No gallop. Pulmonary:      Effort: Pulmonary effort is normal.      Breath sounds: Normal breath sounds. Abdominal:      General: Abdomen is flat. Bowel sounds are normal. There is no distension.       Palpatio evaluate the abdomen. The exam includes images of the liver, gallbladder, common bile duct, pancreas, spleen, kidneys, IVC, and aorta. PATIENT STATED HISTORY: (As transcribed by Technologist)  Mid epigastric pain for 48 hours with nausea.     FINDINGS:  L Disp-30 tablet, R-0

## 2019-12-09 NOTE — RESPIRATORY THERAPY NOTE
The patient was up for discharge and we needed her room for a sick child, so I had the patient get dressed and put her in a wheelchair right outside the room while we cleaned it. I removed her IV and put a band-aid on while she waited for her papers.  I als

## 2020-10-20 ENCOUNTER — HOSPITAL ENCOUNTER (OUTPATIENT)
Age: 45
Discharge: HOME | End: 2020-10-20
Payer: COMMERCIAL

## 2020-10-20 PROCEDURE — 90471 IMMUNIZATION ADMIN: CPT

## 2021-09-26 ENCOUNTER — HOSPITAL ENCOUNTER (EMERGENCY)
Age: 46
Discharge: HOME | End: 2021-09-26
Attending: STUDENT IN AN ORGANIZED HEALTH CARE EDUCATION/TRAINING PROGRAM
Payer: COMMERCIAL

## 2021-09-26 VITALS
DIASTOLIC BLOOD PRESSURE: 102 MMHG | OXYGEN SATURATION: 100 % | SYSTOLIC BLOOD PRESSURE: 166 MMHG | BODY MASS INDEX: 35.85 KG/M2 | TEMPERATURE: 97 F | HEART RATE: 88 BPM | WEIGHT: 210 LBS | RESPIRATION RATE: 18 BRPM | HEIGHT: 64 IN

## 2021-09-26 DIAGNOSIS — M25.531 RIGHT WRIST PAIN: Primary | ICD-10-CM

## 2021-09-26 PROCEDURE — 99283 EMERGENCY DEPT VISIT LOW MDM: CPT

## 2021-09-26 RX ORDER — AMLODIPINE BESYLATE 2.5 MG/1
2.5 TABLET ORAL DAILY
COMMUNITY
End: 2021-12-01

## 2021-09-26 RX ORDER — GABAPENTIN 100 MG/1
100 CAPSULE ORAL 3 TIMES DAILY
Qty: 90 CAPSULE | Refills: 0 | Status: SHIPPED | OUTPATIENT
Start: 2021-09-26 | End: 2022-01-24 | Stop reason: ALTCHOICE

## 2021-09-26 RX ORDER — IBUPROFEN 200 MG
TABLET ORAL
Status: DISCONTINUED
Start: 2021-09-26 | End: 2021-09-26

## 2021-09-26 RX ORDER — IBUPROFEN 400 MG/1
TABLET ORAL
Status: DISCONTINUED
Start: 2021-09-26 | End: 2021-09-26

## 2021-09-26 RX ORDER — IBUPROFEN 600 MG/1
600 TABLET ORAL ONCE
Status: DISCONTINUED | OUTPATIENT
Start: 2021-09-26 | End: 2021-09-26

## 2021-09-26 RX ORDER — HYDROCODONE BITARTRATE AND ACETAMINOPHEN 5; 325 MG/1; MG/1
1-2 TABLET ORAL EVERY 6 HOURS PRN
Qty: 10 TABLET | Refills: 0 | Status: SHIPPED | OUTPATIENT
Start: 2021-09-26 | End: 2021-10-03

## 2021-09-26 RX ORDER — NAPROXEN 500 MG/1
500 TABLET ORAL 2 TIMES DAILY PRN
Qty: 20 TABLET | Refills: 0 | Status: SHIPPED | OUTPATIENT
Start: 2021-09-26 | End: 2021-10-03

## 2021-09-26 NOTE — ED PROVIDER NOTES
Patient Seen in: THE [facility] Emergency Department In [place]      History   Patient presents with:  Arm Pain    Stated Complaint: right wrist pain     Subjective:   HPI    Patient is a 49-year-old female who presents emergency department reporting severe r joint is noted without redness or edema. Distally neurovascularly intact. ED Course   Labs Reviewed - No data to display            MDM      As the patient has not sustained any injury and not suspect fracture or dislocation.  Given the longstanding dura

## 2021-09-26 NOTE — ED INITIAL ASSESSMENT (HPI)
Pt had surgery on her r wrist years ago and has been having pain to her wrist getting cortisone injections for pain. Pt is having worse pain no new injury or lifting anything.

## 2021-11-30 ENCOUNTER — APPOINTMENT (OUTPATIENT)
Dept: GENERAL RADIOLOGY | Age: 46
End: 2021-11-30
Attending: EMERGENCY MEDICINE
Payer: COMMERCIAL

## 2021-11-30 ENCOUNTER — APPOINTMENT (OUTPATIENT)
Dept: CT IMAGING | Age: 46
End: 2021-11-30
Attending: EMERGENCY MEDICINE
Payer: COMMERCIAL

## 2021-11-30 ENCOUNTER — HOSPITAL ENCOUNTER (OUTPATIENT)
Facility: HOSPITAL | Age: 46
Setting detail: OBSERVATION
Discharge: HOME | End: 2021-12-01
Attending: EMERGENCY MEDICINE | Admitting: INTERNAL MEDICINE
Payer: COMMERCIAL

## 2021-11-30 DIAGNOSIS — R07.9 CHEST PAIN OF UNCERTAIN ETIOLOGY: Primary | ICD-10-CM

## 2021-11-30 PROCEDURE — 71045 X-RAY EXAM CHEST 1 VIEW: CPT | Performed by: EMERGENCY MEDICINE

## 2021-11-30 PROCEDURE — 71275 CT ANGIOGRAPHY CHEST: CPT | Performed by: EMERGENCY MEDICINE

## 2021-11-30 PROCEDURE — 99219 INITIAL OBSERVATION CARE,LEVL II: CPT | Performed by: STUDENT IN AN ORGANIZED HEALTH CARE EDUCATION/TRAINING PROGRAM

## 2021-11-30 RX ORDER — HYDROMORPHONE HYDROCHLORIDE 1 MG/ML
0.5 INJECTION, SOLUTION INTRAMUSCULAR; INTRAVENOUS; SUBCUTANEOUS EVERY 30 MIN PRN
Status: ACTIVE | OUTPATIENT
Start: 2021-11-30 | End: 2021-12-01

## 2021-11-30 RX ORDER — ACETAMINOPHEN 325 MG/1
650 TABLET ORAL EVERY 6 HOURS PRN
Status: DISCONTINUED | OUTPATIENT
Start: 2021-11-30 | End: 2021-12-01

## 2021-11-30 RX ORDER — SODIUM PHOSPHATE, DIBASIC AND SODIUM PHOSPHATE, MONOBASIC 7; 19 G/133ML; G/133ML
1 ENEMA RECTAL ONCE AS NEEDED
Status: DISCONTINUED | OUTPATIENT
Start: 2021-11-30 | End: 2021-12-01

## 2021-11-30 RX ORDER — ASPIRIN 81 MG/1
324 TABLET, CHEWABLE ORAL ONCE
Status: COMPLETED | OUTPATIENT
Start: 2021-11-30 | End: 2021-11-30

## 2021-11-30 RX ORDER — PROCHLORPERAZINE EDISYLATE 5 MG/ML
5 INJECTION INTRAMUSCULAR; INTRAVENOUS EVERY 8 HOURS PRN
Status: DISCONTINUED | OUTPATIENT
Start: 2021-11-30 | End: 2021-12-01

## 2021-11-30 RX ORDER — POLYETHYLENE GLYCOL 3350 17 G/17G
17 POWDER, FOR SOLUTION ORAL DAILY PRN
Status: DISCONTINUED | OUTPATIENT
Start: 2021-11-30 | End: 2021-12-01

## 2021-11-30 RX ORDER — ONDANSETRON 2 MG/ML
4 INJECTION INTRAMUSCULAR; INTRAVENOUS EVERY 4 HOURS PRN
Status: ACTIVE | OUTPATIENT
Start: 2021-11-30 | End: 2021-12-01

## 2021-11-30 RX ORDER — BISACODYL 10 MG
10 SUPPOSITORY, RECTAL RECTAL
Status: DISCONTINUED | OUTPATIENT
Start: 2021-11-30 | End: 2021-12-01

## 2021-11-30 RX ORDER — SODIUM CHLORIDE 9 MG/ML
INJECTION, SOLUTION INTRAVENOUS CONTINUOUS
Status: ACTIVE | OUTPATIENT
Start: 2021-11-30 | End: 2021-12-01

## 2021-11-30 RX ORDER — HEPARIN SODIUM 5000 [USP'U]/ML
5000 INJECTION, SOLUTION INTRAVENOUS; SUBCUTANEOUS EVERY 8 HOURS SCHEDULED
Status: DISCONTINUED | OUTPATIENT
Start: 2021-11-30 | End: 2021-12-01

## 2021-11-30 RX ORDER — FLUTICASONE PROPIONATE 50 MCG
2 SPRAY, SUSPENSION (ML) NASAL DAILY
COMMUNITY

## 2021-11-30 RX ORDER — NITROGLYCERIN 0.4 MG/1
0.4 TABLET SUBLINGUAL ONCE
Status: COMPLETED | OUTPATIENT
Start: 2021-11-30 | End: 2021-11-30

## 2021-11-30 RX ORDER — MELATONIN
3 NIGHTLY PRN
Status: DISCONTINUED | OUTPATIENT
Start: 2021-11-30 | End: 2021-12-01

## 2021-11-30 RX ORDER — ONDANSETRON 2 MG/ML
4 INJECTION INTRAMUSCULAR; INTRAVENOUS EVERY 6 HOURS PRN
Status: DISCONTINUED | OUTPATIENT
Start: 2021-11-30 | End: 2021-11-30

## 2021-11-30 NOTE — ED INITIAL ASSESSMENT (HPI)
Pt c/o chest pain since thanksgiving. Pt describes pain as stabbing and states it is painful to touch, Pain does not radiate, pain radiates 8/10. Pt also c/o lightheadedness.  Denies AMANDA

## 2021-12-01 ENCOUNTER — APPOINTMENT (OUTPATIENT)
Dept: CV DIAGNOSTICS | Facility: HOSPITAL | Age: 46
End: 2021-12-01
Attending: STUDENT IN AN ORGANIZED HEALTH CARE EDUCATION/TRAINING PROGRAM
Payer: COMMERCIAL

## 2021-12-01 ENCOUNTER — APPOINTMENT (OUTPATIENT)
Dept: CT IMAGING | Facility: HOSPITAL | Age: 46
End: 2021-12-01
Attending: HOSPITALIST
Payer: COMMERCIAL

## 2021-12-01 VITALS
HEIGHT: 64 IN | SYSTOLIC BLOOD PRESSURE: 128 MMHG | OXYGEN SATURATION: 99 % | WEIGHT: 203.13 LBS | RESPIRATION RATE: 18 BRPM | DIASTOLIC BLOOD PRESSURE: 75 MMHG | HEART RATE: 85 BPM | TEMPERATURE: 98 F | BODY MASS INDEX: 34.68 KG/M2

## 2021-12-01 PROCEDURE — 99217 OBSERVATION CARE DISCHARGE: CPT | Performed by: HOSPITALIST

## 2021-12-01 PROCEDURE — 93306 TTE W/DOPPLER COMPLETE: CPT | Performed by: STUDENT IN AN ORGANIZED HEALTH CARE EDUCATION/TRAINING PROGRAM

## 2021-12-01 PROCEDURE — 74160 CT ABDOMEN W/CONTRAST: CPT | Performed by: HOSPITALIST

## 2021-12-01 RX ORDER — LOSARTAN POTASSIUM 100 MG/1
100 TABLET ORAL DAILY
Status: DISCONTINUED | OUTPATIENT
Start: 2021-12-01 | End: 2021-12-01

## 2021-12-01 RX ORDER — MONTELUKAST SODIUM 10 MG/1
10 TABLET ORAL NIGHTLY
Status: DISCONTINUED | OUTPATIENT
Start: 2021-12-01 | End: 2021-12-01

## 2021-12-01 RX ORDER — LOSARTAN POTASSIUM 100 MG/1
100 TABLET ORAL DAILY
Qty: 30 TABLET | Refills: 0 | Status: SHIPPED | OUTPATIENT
Start: 2021-12-02

## 2021-12-01 RX ORDER — BACLOFEN 5 MG/1
5 TABLET ORAL ONCE
Status: COMPLETED | OUTPATIENT
Start: 2021-12-01 | End: 2021-12-01

## 2021-12-01 RX ORDER — AMLODIPINE BESYLATE 5 MG/1
5 TABLET ORAL DAILY
Qty: 30 TABLET | Refills: 0 | Status: SHIPPED | OUTPATIENT
Start: 2021-12-02

## 2021-12-01 RX ORDER — CHLORTHALIDONE 25 MG/1
25 TABLET ORAL DAILY
Status: DISCONTINUED | OUTPATIENT
Start: 2021-12-01 | End: 2021-12-01

## 2021-12-01 RX ORDER — PANTOPRAZOLE SODIUM 20 MG/1
20 TABLET, DELAYED RELEASE ORAL
Status: DISCONTINUED | OUTPATIENT
Start: 2021-12-01 | End: 2021-12-01

## 2021-12-01 RX ORDER — SODIUM CHLORIDE 9 MG/ML
INJECTION, SOLUTION INTRAVENOUS CONTINUOUS
Status: DISCONTINUED | OUTPATIENT
Start: 2021-12-01 | End: 2021-12-01

## 2021-12-01 RX ORDER — NITROGLYCERIN 0.4 MG/1
0.4 TABLET SUBLINGUAL EVERY 5 MIN PRN
Status: DISCONTINUED | OUTPATIENT
Start: 2021-12-01 | End: 2021-12-01

## 2021-12-01 RX ORDER — ASPIRIN 81 MG/1
81 TABLET ORAL DAILY
Qty: 30 TABLET | Refills: 0 | Status: SHIPPED | OUTPATIENT
Start: 2021-12-01

## 2021-12-01 RX ORDER — DEXTROSE MONOHYDRATE 25 G/50ML
50 INJECTION, SOLUTION INTRAVENOUS
Status: DISCONTINUED | OUTPATIENT
Start: 2021-12-01 | End: 2021-12-01

## 2021-12-01 RX ORDER — ASPIRIN 325 MG
325 TABLET ORAL DAILY
Status: DISCONTINUED | OUTPATIENT
Start: 2021-12-01 | End: 2021-12-01

## 2021-12-01 RX ORDER — POTASSIUM CHLORIDE 14.9 MG/ML
20 INJECTION INTRAVENOUS ONCE
Status: COMPLETED | OUTPATIENT
Start: 2021-12-01 | End: 2021-12-01

## 2021-12-01 RX ORDER — AMLODIPINE BESYLATE 5 MG/1
5 TABLET ORAL DAILY
Status: DISCONTINUED | OUTPATIENT
Start: 2021-12-01 | End: 2021-12-01

## 2021-12-01 NOTE — H&P
ADAIR HOSPITALIST  History and Physical     Winter Harman Patient Status:  Observation    1975 MRN [id number]   [facility] 8NE-A Attending Hawk Yang MD   Hosp Day # 0 PCP [address]     Chief Complaint: Chest pain breakfast., Disp: , Rfl:   Montelukast Sodium 10 MG Oral Tab, Take 10 mg by mouth nightly., Disp: , Rfl:   albuterol Sulfate (VENTOLIN) (5 MG/ML) 0.5% Inhalation Nebu Soln, Take 2.5 mg by nebulization every 6 (six) hours as needed for Wheezing., Disp: , Rf hours. Cardiac  Recent Labs   Lab 11/30/21 1810   TROP <0.045       Creatinine Kinase  No results for input(s): CK in the last 168 hours.     Inflammatory Markers  Recent Labs   Lab 11/30/21 1810   DDIMER 0.60*       Imaging: Imaging data reviewed in E

## 2021-12-01 NOTE — PROGRESS NOTES
NURSING ADMISSION NOTE      Patient admitted via Cart  Oriented to room. Safety precautions initiated. Bed in low position. Call light in reach. Intermittent chest pain noted. EKG normal. Will continue to monitor. All questions answered.

## 2021-12-01 NOTE — ED PROVIDER NOTES
Patient Seen in: THE [facility] Emergency Department In [place]      History   Patient presents with:  Chest Pain Angina    Stated Complaint: lt chest pain for a week    Subjective:   HPI    Patient is a 40-year-old female who states that for the past 5 days in no acute distress. HEENT: Extraocular muscles intact, pupils equal round reactive to light and accommodation. Mouth normal, neck supple, no meningismus. LUNGS: Lungs clear to auscultation bilaterally.   CARDIOVASCULAR: + S1-S2, regular rate and rhythm the pancreatic tail.  Correlation with enzymes and clinical suspicion for pancreatitis recommended. MDM      Patient was given aspirin, nitro. On reevaluation patient's chest pain had resolved. Patient had negative troponin.   Patient D-dimer wa

## 2021-12-01 NOTE — PROGRESS NOTES
Admission Orthostatics      11/30/21 2300 11/30/21 2305 11/30/21 2310   Vital Signs   /84 (!) 159/95 (!) 141/95   MAP (mmHg) 101 112 106   BP Location Right arm Right arm Right arm   BP Method Automatic Automatic Automatic   Patient Position Lying Si

## 2021-12-01 NOTE — CONSULTS
Jameel  Consultation    America Islas Patient Status:  Observation    1975 MRN [id number]   [facility] 8NE-A Attending Tania Mora MD   Hosp Day # 0 PCP [address]       Reason for consultation;   Chest pain you for your consult.     Level of care: Festus Dunaway MD  Interventional Cardiology  [address] [place] [address]    ------------------------------------------------------------------------------------------------------------------------------

## 2021-12-01 NOTE — ED QUICK NOTES
Orders for admission, patient is aware of plan and ready to go upstairs. Any questions, please call ED RN Sohan Mcallister at extension [phone]. Vaccinated?  Yes  Type of COVID test sent: Rapid   COVID Suspicion level: Low      Titratable drug(s) infusing: none  Rat

## 2021-12-04 NOTE — DISCHARGE SUMMARY
ADAIR HOSPITALIST  DISCHARGE SUMMARY     Sy Galaviz Patient Status:  Observation    1975 MRN [id number]   [facility] 8NE-A Attending No att. providers found   Hosp Day # 0 CARMEN Castro     Date of Admission: 20 known as: Solmon Ear  What changed:   · medication strength  · how much to take      Take 1 tablet (5 mg total) by mouth daily.    Quantity: 30 tablet  Refills: 0     losartan 100 MG Tabs  Commonly known as: COZAAR  What changed:   · medication strength  · how [facility]  [address] [facility]          Amy MD Jagruti  [address]  [phone]    In 1 week      Appointments for Next 30 Days 12/4/2021 - 1/3/2022            N

## 2021-12-17 ENCOUNTER — APPOINTMENT (OUTPATIENT)
Dept: GENERAL RADIOLOGY | Age: 46
End: 2021-12-17
Attending: EMERGENCY MEDICINE
Payer: COMMERCIAL

## 2021-12-17 ENCOUNTER — HOSPITAL ENCOUNTER (OUTPATIENT)
Age: 46
Discharge: HOME | End: 2021-12-17
Attending: EMERGENCY MEDICINE
Payer: COMMERCIAL

## 2021-12-17 VITALS
SYSTOLIC BLOOD PRESSURE: 110 MMHG | WEIGHT: 198 LBS | DIASTOLIC BLOOD PRESSURE: 64 MMHG | TEMPERATURE: 98 F | HEIGHT: 61 IN | BODY MASS INDEX: 37.38 KG/M2 | HEART RATE: 97 BPM | RESPIRATION RATE: 14 BRPM | OXYGEN SATURATION: 99 %

## 2021-12-17 DIAGNOSIS — R10.9 ABDOMINAL PAIN OF UNKNOWN ETIOLOGY: Primary | ICD-10-CM

## 2021-12-17 PROCEDURE — 36415 COLL VENOUS BLD VENIPUNCTURE: CPT

## 2021-12-17 PROCEDURE — 85025 COMPLETE CBC W/AUTO DIFF WBC: CPT | Performed by: EMERGENCY MEDICINE

## 2021-12-17 PROCEDURE — 74018 RADEX ABDOMEN 1 VIEW: CPT | Performed by: EMERGENCY MEDICINE

## 2021-12-17 PROCEDURE — 99213 OFFICE O/P EST LOW 20 MIN: CPT

## 2021-12-17 PROCEDURE — 81002 URINALYSIS NONAUTO W/O SCOPE: CPT | Performed by: EMERGENCY MEDICINE

## 2021-12-17 PROCEDURE — 80076 HEPATIC FUNCTION PANEL: CPT | Performed by: EMERGENCY MEDICINE

## 2021-12-17 PROCEDURE — 80047 BASIC METABLC PNL IONIZED CA: CPT

## 2021-12-17 PROCEDURE — 99214 OFFICE O/P EST MOD 30 MIN: CPT

## 2021-12-17 PROCEDURE — 83690 ASSAY OF LIPASE: CPT | Performed by: EMERGENCY MEDICINE

## 2021-12-17 RX ORDER — TRAMADOL HYDROCHLORIDE 50 MG/1
50 TABLET ORAL EVERY 6 HOURS PRN
Qty: 10 TABLET | Refills: 0 | Status: SHIPPED | OUTPATIENT
Start: 2021-12-17 | End: 2021-12-22

## 2021-12-17 RX ORDER — DICYCLOMINE HCL 20 MG
20 TABLET ORAL 4 TIMES DAILY PRN
Qty: 30 TABLET | Refills: 0 | Status: SHIPPED | OUTPATIENT
Start: 2021-12-17 | End: 2022-01-16

## 2021-12-18 NOTE — ED PROVIDER NOTES
Patient Seen in: [facility] [place]      History   Patient presents with:  Abdomen/Flank Pain    Stated Complaint: Abdominal/back pain 3 wks    Subjective:   HPI    Patient is a 78-year-old female who started having epigastric, left upper quadran LMP 09/20/2010   SpO2 99%   BMI 37.41 kg/m²         Physical Exam  Vitals and nursing note reviewed. Constitutional:       Appearance: She is well-developed. HENT:      Head: Normocephalic and atraumatic.    Eyes:      Conjunctiva/sclera: Conjunctivae n Jace Quiroga MD on 12/17/2021 at 6:48 PM       CT ABDOMEN(CONTRAST ONLY) (CPT=74160)    Result Date: 12/1/2021  PROCEDURE:  CT ABDOMEN (CONTRAST ONLY) (CPT=74160)  COMPARISON:  [place], CT, CT ANGIOGRAPHY, CHEST (CPT=71275), 11/30/2021, 8:08 PM.  [facility] [place] ARTERIES W CALCIUM SCORING (CPT=75574), 8/31/2018, 1:27 PM.  INDICATIONS:  lt chest pain for a week  TECHNIQUE:  IV contrast-enhanced multislice CT angiography is performed through the pulmonary arterial anatomy. 3D volume renderings are generated.   Dose r Chery, [address]                                                               [phone] ---------------------------------------------------------------------------- Transthoracic Echocardiogram Name:Raine 65-70%. No regional wall motion abnormalities. Left atrium:  The left atrium was normal in size. The left atrial volume was normal. Right ventricle: The cavity size was normal. Systolic function was normal. Right atrium:  The atrium was normal in size.  Mi IVS/LV PW ratio, ED, PLAX               1.01         ---------  LV ejection fraction                    71    %      >=55   Ventricular septum                      Value        Reference  IVS thickness, ED, PLAX                 0.9   cm     0.6 - 0.9   Aor angles remain sharp. Heart and pulmonary vessels appear stable, normal caliber. Mediastinal contours are smooth. No pneumothorax. CONCLUSION:  No evidence of active cardiopulmonary disease.      Dictated by (CST): Frank Martinez MD on 11/30/2021

## 2021-12-18 NOTE — ED INITIAL ASSESSMENT (HPI)
Patient presents with left sided abdominal pain radiates to left flank. Denies urinary sx. No fever.

## 2022-01-04 ENCOUNTER — HOSPITAL ENCOUNTER (OUTPATIENT)
Age: 47
Discharge: HOME | End: 2022-01-04
Payer: COMMERCIAL

## 2022-01-04 DIAGNOSIS — Z20.822 LAB TEST NEGATIVE FOR COVID-19 VIRUS: ICD-10-CM

## 2022-01-04 DIAGNOSIS — B34.9 VIRAL ILLNESS: Primary | ICD-10-CM

## 2022-01-04 LAB — SARS-COV-2 RNA RESP QL NAA+PROBE: NOT DETECTED

## 2022-01-04 PROCEDURE — 99213 OFFICE O/P EST LOW 20 MIN: CPT

## 2022-01-04 PROCEDURE — 99212 OFFICE O/P EST SF 10 MIN: CPT

## 2022-01-05 NOTE — ED INITIAL ASSESSMENT (HPI)
Patient reports dizziness, headache, fatigue, cough/congestion, sore throat.  had work exposure to [employer].

## 2022-01-05 NOTE — ED PROVIDER NOTES
Patient Seen in: [facility] [place]      History   Patient presents with:  Covid-19 Test    Stated Complaint: dizziness/headache/nausea/congestion    Subjective:   67-year-old female, the comes in for Covid testing, as her  who has symptom normal.      Left Ear: Tympanic membrane, ear canal and external ear normal.      Nose: Nose normal. No congestion or rhinorrhea. Right Sinus: No maxillary sinus tenderness or frontal sinus tenderness.       Left Sinus: No maxillary sinus tenderness or Patient understands that if she feels any worse, she should be reevaluated More appropriate in an ER if her symptoms worsen. Supportive/home management of diagnosis/illness/injury discussed. Red flag symptoms discussed.   Signs and symptoms/criteria that

## 2022-01-24 ENCOUNTER — HOSPITAL ENCOUNTER (OUTPATIENT)
Age: 47
Discharge: HOME | End: 2022-01-24
Attending: EMERGENCY MEDICINE
Payer: COMMERCIAL

## 2022-01-24 VITALS
OXYGEN SATURATION: 98 % | SYSTOLIC BLOOD PRESSURE: 113 MMHG | HEART RATE: 90 BPM | TEMPERATURE: 98 F | BODY MASS INDEX: 33.46 KG/M2 | DIASTOLIC BLOOD PRESSURE: 75 MMHG | HEIGHT: 64 IN | WEIGHT: 196 LBS | RESPIRATION RATE: 16 BRPM

## 2022-01-24 DIAGNOSIS — Z20.822 EXPOSURE TO COVID-19 VIRUS: ICD-10-CM

## 2022-01-24 DIAGNOSIS — B34.9 VIRAL SYNDROME: Primary | ICD-10-CM

## 2022-01-24 LAB — SARS-COV-2 RNA RESP QL NAA+PROBE: NOT DETECTED

## 2022-01-24 PROCEDURE — 99213 OFFICE O/P EST LOW 20 MIN: CPT

## 2022-01-24 RX ORDER — ALBUTEROL SULFATE 90 UG/1
2 AEROSOL, METERED RESPIRATORY (INHALATION) EVERY 4 HOURS PRN
Qty: 1 EACH | Refills: 0 | Status: SHIPPED | OUTPATIENT
Start: 2022-01-24 | End: 2022-02-23

## 2022-01-24 NOTE — ED INITIAL ASSESSMENT (HPI)
Pt presents today for covid testing. Pt has nasal congestion and headache since Sunday. Pt denies any fevers.

## 2022-01-24 NOTE — ED PROVIDER NOTES
Patient Seen in: [facility] [place]      History   Patient presents with:  Covid-19 Test  Nasal Congestion  Headache    Stated Complaint: congestion/runny nose    Subjective:   HPI    69-year-old female with a history of asthma, diabetes, hyperte No clubbing or cyanosis. Neuro: No focal deficit is noted. ED Course     Labs Reviewed   RAPID SARS-COV-2 BY PCR - Normal                   MDM   Patient was screened and evaluated during this visit.    As a treating physician attending to the patient,

## 2022-03-09 ENCOUNTER — HOSPITAL ENCOUNTER (OUTPATIENT)
Age: 47
Discharge: HOME | End: 2022-03-09
Attending: EMERGENCY MEDICINE
Payer: COMMERCIAL

## 2022-03-09 VITALS
HEART RATE: 96 BPM | RESPIRATION RATE: 16 BRPM | DIASTOLIC BLOOD PRESSURE: 72 MMHG | HEIGHT: 61 IN | OXYGEN SATURATION: 98 % | TEMPERATURE: 98 F | BODY MASS INDEX: 37.38 KG/M2 | WEIGHT: 198 LBS | SYSTOLIC BLOOD PRESSURE: 123 MMHG

## 2022-03-09 DIAGNOSIS — L02.91 ABSCESS: Primary | ICD-10-CM

## 2022-03-09 LAB — GLUCOSE BLD-MCNC: 151 MG/DL (ref 70–99)

## 2022-03-09 PROCEDURE — 10060 I&D ABSCESS SIMPLE/SINGLE: CPT

## 2022-03-09 PROCEDURE — 99213 OFFICE O/P EST LOW 20 MIN: CPT

## 2022-03-09 PROCEDURE — 82962 GLUCOSE BLOOD TEST: CPT

## 2022-03-09 RX ORDER — CLINDAMYCIN HYDROCHLORIDE 300 MG/1
600 CAPSULE ORAL 3 TIMES DAILY
Qty: 60 CAPSULE | Refills: 0 | Status: SHIPPED | OUTPATIENT
Start: 2022-03-09 | End: 2022-03-19

## 2022-03-09 NOTE — ED INITIAL ASSESSMENT (HPI)
Pt aox4. Pt c/o lump to left groin started Sunday. Pt states lump has gotten larger with increased pain. Pt denies fever.

## 2022-03-11 ENCOUNTER — HOSPITAL ENCOUNTER (OUTPATIENT)
Age: 47
Discharge: HOME | End: 2022-03-11
Payer: COMMERCIAL

## 2022-03-11 VITALS
RESPIRATION RATE: 20 BRPM | WEIGHT: 198 LBS | SYSTOLIC BLOOD PRESSURE: 117 MMHG | BODY MASS INDEX: 33.39 KG/M2 | HEART RATE: 85 BPM | HEIGHT: 64.5 IN | TEMPERATURE: 97 F | DIASTOLIC BLOOD PRESSURE: 76 MMHG | OXYGEN SATURATION: 98 %

## 2022-03-11 DIAGNOSIS — Z51.89 ENCOUNTER FOR WOUND RE-CHECK: ICD-10-CM

## 2022-03-11 DIAGNOSIS — L02.214 ABSCESS OF LEFT GROIN: Primary | ICD-10-CM

## 2022-03-11 PROCEDURE — 99211 OFF/OP EST MAY X REQ PHY/QHP: CPT

## 2022-03-11 NOTE — ED INITIAL ASSESSMENT (HPI)
Patient here for packing removal. C/o burning pain at times to the area. Pt noted pus, denies fevers.

## 2022-03-26 ENCOUNTER — OFFICE VISIT (OUTPATIENT)
Dept: INTERNAL MEDICINE CLINIC | Facility: CLINIC | Age: 47
End: 2022-03-26
Payer: COMMERCIAL

## 2022-03-26 VITALS
HEIGHT: 64.5 IN | DIASTOLIC BLOOD PRESSURE: 76 MMHG | WEIGHT: 202 LBS | BODY MASS INDEX: 34.07 KG/M2 | HEART RATE: 88 BPM | OXYGEN SATURATION: 97 % | RESPIRATION RATE: 16 BRPM | TEMPERATURE: 98 F | SYSTOLIC BLOOD PRESSURE: 116 MMHG

## 2022-03-26 DIAGNOSIS — E66.09 OBESITY DUE TO EXCESS CALORIES, UNSPECIFIED CLASSIFICATION, UNSPECIFIED WHETHER SERIOUS COMORBIDITY PRESENT: ICD-10-CM

## 2022-03-26 DIAGNOSIS — J45.909 UNCOMPLICATED ASTHMA, UNSPECIFIED ASTHMA SEVERITY, UNSPECIFIED WHETHER PERSISTENT: ICD-10-CM

## 2022-03-26 DIAGNOSIS — Z00.00 ANNUAL PHYSICAL EXAM: Primary | ICD-10-CM

## 2022-03-26 DIAGNOSIS — Z12.11 COLON CANCER SCREENING: ICD-10-CM

## 2022-03-26 DIAGNOSIS — E28.319 EARLY MENOPAUSE OCCURRING IN PATIENT AGE YOUNGER THAN 45 YEARS: ICD-10-CM

## 2022-03-26 DIAGNOSIS — I10 ESSENTIAL HYPERTENSION, BENIGN: ICD-10-CM

## 2022-03-26 DIAGNOSIS — R59.9 LYMPH NODE ENLARGEMENT: ICD-10-CM

## 2022-03-26 DIAGNOSIS — Z12.31 ENCOUNTER FOR SCREENING MAMMOGRAM FOR MALIGNANT NEOPLASM OF BREAST: ICD-10-CM

## 2022-03-26 DIAGNOSIS — K21.9 GASTROESOPHAGEAL REFLUX DISEASE WITHOUT ESOPHAGITIS: ICD-10-CM

## 2022-03-26 DIAGNOSIS — R19.8 ABNORMAL BOWEL MOVEMENT: ICD-10-CM

## 2022-03-26 PROBLEM — E66.01 MORBID OBESITY DUE TO EXCESS CALORIES (HCC): Status: ACTIVE | Noted: 2017-05-19

## 2022-03-26 PROBLEM — R07.9 CHEST PAIN OF UNCERTAIN ETIOLOGY: Status: RESOLVED | Noted: 2021-11-30 | Resolved: 2022-03-26

## 2022-03-26 PROCEDURE — 99386 PREV VISIT NEW AGE 40-64: CPT | Performed by: INTERNAL MEDICINE

## 2022-03-26 PROCEDURE — 3074F SYST BP LT 130 MM HG: CPT | Performed by: INTERNAL MEDICINE

## 2022-03-26 PROCEDURE — 99204 OFFICE O/P NEW MOD 45 MIN: CPT | Performed by: INTERNAL MEDICINE

## 2022-03-26 PROCEDURE — 3008F BODY MASS INDEX DOCD: CPT | Performed by: INTERNAL MEDICINE

## 2022-03-26 PROCEDURE — 3078F DIAST BP <80 MM HG: CPT | Performed by: INTERNAL MEDICINE

## 2022-03-26 RX ORDER — SEMAGLUTIDE 1.34 MG/ML
1 INJECTION, SOLUTION SUBCUTANEOUS WEEKLY
COMMUNITY
Start: 2021-11-27 | End: 2022-03-26

## 2022-03-26 RX ORDER — TRIAMCINOLONE ACETONIDE 0.25 MG/G
1 CREAM TOPICAL
COMMUNITY
Start: 2020-04-10

## 2022-03-26 RX ORDER — ORAL SEMAGLUTIDE 3 MG/1
1 TABLET ORAL DAILY
COMMUNITY
Start: 2022-01-19 | End: 2022-03-26 | Stop reason: DRUGHIGH

## 2022-03-26 RX ORDER — ORAL SEMAGLUTIDE 7 MG/1
TABLET ORAL
COMMUNITY
Start: 2022-03-25

## 2022-04-04 ENCOUNTER — TELEPHONE (OUTPATIENT)
Dept: INTERNAL MEDICINE CLINIC | Facility: CLINIC | Age: 47
End: 2022-04-04

## 2022-04-04 NOTE — TELEPHONE ENCOUNTER
Tulio with THE [facility] CT scan wanted to confirm CT chest orders w/wo contrast. Tulio stated they need more reasoning why CT was ordered with w/wo contrast since they try to limit radiation for pt. Tulio wanted to know how exam was done at [facility] since they are looking to compare results. Pt has appointment 4/9 at 9:30am for imaging.      Tulio [phone]

## 2022-04-09 ENCOUNTER — HOSPITAL ENCOUNTER (OUTPATIENT)
Dept: CT IMAGING | Age: 47
Discharge: HOME | End: 2022-04-09
Attending: INTERNAL MEDICINE
Payer: COMMERCIAL

## 2022-04-09 DIAGNOSIS — R59.9 LYMPH NODE ENLARGEMENT: ICD-10-CM

## 2022-04-09 LAB — CREAT BLD-MCNC: 1.2 MG/DL

## 2022-04-09 PROCEDURE — 71260 CT THORAX DX C+: CPT | Performed by: INTERNAL MEDICINE

## 2022-04-09 PROCEDURE — 82565 ASSAY OF CREATININE: CPT

## 2022-04-09 RX ORDER — IOHEXOL 350 MG/ML
75 INJECTION, SOLUTION INTRAVENOUS
Status: COMPLETED | OUTPATIENT
Start: 2022-04-09 | End: 2022-04-09

## 2022-04-09 RX ADMIN — IOHEXOL 75 ML: 350 INJECTION, SOLUTION INTRAVENOUS at 10:28:00

## 2022-04-16 ENCOUNTER — HOSPITAL ENCOUNTER (OUTPATIENT)
Dept: MAMMOGRAPHY | Age: 47
Discharge: HOME | End: 2022-04-16
Attending: INTERNAL MEDICINE
Payer: COMMERCIAL

## 2022-04-16 ENCOUNTER — HOSPITAL ENCOUNTER (OUTPATIENT)
Dept: BONE DENSITY | Age: 47
Discharge: HOME | End: 2022-04-16
Attending: INTERNAL MEDICINE
Payer: COMMERCIAL

## 2022-04-16 DIAGNOSIS — E28.319 EARLY MENOPAUSE OCCURRING IN PATIENT AGE YOUNGER THAN 45 YEARS: ICD-10-CM

## 2022-04-16 DIAGNOSIS — Z12.31 ENCOUNTER FOR SCREENING MAMMOGRAM FOR MALIGNANT NEOPLASM OF BREAST: ICD-10-CM

## 2022-04-16 DIAGNOSIS — Z00.00 ANNUAL PHYSICAL EXAM: ICD-10-CM

## 2022-04-16 PROCEDURE — 77067 SCR MAMMO BI INCL CAD: CPT | Performed by: INTERNAL MEDICINE

## 2022-04-16 PROCEDURE — 77063 BREAST TOMOSYNTHESIS BI: CPT | Performed by: INTERNAL MEDICINE

## 2022-04-16 PROCEDURE — 77080 DXA BONE DENSITY AXIAL: CPT | Performed by: INTERNAL MEDICINE

## 2022-05-11 PROBLEM — R59.1 LYMPHADENOPATHY: Status: ACTIVE | Noted: 2022-05-11

## 2022-05-24 ENCOUNTER — HOSPITAL ENCOUNTER (OUTPATIENT)
Dept: GENERAL RADIOLOGY | Age: 47
Discharge: HOME | End: 2022-05-24
Attending: STUDENT IN AN ORGANIZED HEALTH CARE EDUCATION/TRAINING PROGRAM
Payer: COMMERCIAL

## 2022-05-24 DIAGNOSIS — K59.00 CONSTIPATION, UNSPECIFIED CONSTIPATION TYPE: ICD-10-CM

## 2022-05-24 DIAGNOSIS — K58.2 IRRITABLE BOWEL SYNDROME WITH BOTH CONSTIPATION AND DIARRHEA: ICD-10-CM

## 2022-05-24 PROCEDURE — 74021 RADEX ABDOMEN 3+ VIEWS: CPT | Performed by: STUDENT IN AN ORGANIZED HEALTH CARE EDUCATION/TRAINING PROGRAM

## 2022-05-28 ENCOUNTER — LAB ENCOUNTER (OUTPATIENT)
Dept: LAB | Age: 47
End: 2022-05-28
Attending: INTERNAL MEDICINE
Payer: COMMERCIAL

## 2022-05-28 DIAGNOSIS — E66.09 OBESITY DUE TO EXCESS CALORIES, UNSPECIFIED CLASSIFICATION, UNSPECIFIED WHETHER SERIOUS COMORBIDITY PRESENT: ICD-10-CM

## 2022-05-28 DIAGNOSIS — K58.2 IRRITABLE BOWEL SYNDROME WITH BOTH CONSTIPATION AND DIARRHEA: ICD-10-CM

## 2022-05-28 DIAGNOSIS — K62.5 BRBPR (BRIGHT RED BLOOD PER RECTUM): ICD-10-CM

## 2022-05-28 DIAGNOSIS — R79.89 ELEVATED SERUM CREATININE: ICD-10-CM

## 2022-05-28 LAB
ALBUMIN SERPL-MCNC: 3.8 G/DL (ref 3.4–5)
ALBUMIN/GLOB SERPL: 1.1 {RATIO} (ref 1–2)
ALP LIVER SERPL-CCNC: 54 U/L
ALT SERPL-CCNC: 24 U/L
ANION GAP SERPL CALC-SCNC: 5 MMOL/L (ref 0–18)
AST SERPL-CCNC: 17 U/L (ref 15–37)
BASOPHILS # BLD AUTO: 0.06 X10(3) UL (ref 0–0.2)
BASOPHILS NFR BLD AUTO: 0.9 %
BILIRUB SERPL-MCNC: 0.3 MG/DL (ref 0.1–2)
BUN BLD-MCNC: 11 MG/DL (ref 7–18)
CALCIUM BLD-MCNC: 9.2 MG/DL (ref 8.5–10.1)
CHLORIDE SERPL-SCNC: 106 MMOL/L (ref 98–112)
CO2 SERPL-SCNC: 27 MMOL/L (ref 21–32)
CREAT BLD-MCNC: 1.05 MG/DL
EOSINOPHIL # BLD AUTO: 0.29 X10(3) UL (ref 0–0.7)
EOSINOPHIL NFR BLD AUTO: 4.6 %
ERYTHROCYTE [DISTWIDTH] IN BLOOD BY AUTOMATED COUNT: 14 %
FASTING STATUS PATIENT QL REPORTED: YES
GLOBULIN PLAS-MCNC: 3.5 G/DL (ref 2.8–4.4)
GLUCOSE BLD-MCNC: 100 MG/DL (ref 70–99)
HCT VFR BLD AUTO: 39.4 %
HGB BLD-MCNC: 12.9 G/DL
IMM GRANULOCYTES # BLD AUTO: 0.01 X10(3) UL (ref 0–1)
IMM GRANULOCYTES NFR BLD: 0.2 %
INR BLD: 0.98 (ref 0.8–1.2)
LIPASE SERPL-CCNC: 143 U/L (ref 73–393)
LYMPHOCYTES # BLD AUTO: 2.87 X10(3) UL (ref 1–4)
LYMPHOCYTES NFR BLD AUTO: 45.3 %
MCH RBC QN AUTO: 30.7 PG (ref 26–34)
MCHC RBC AUTO-ENTMCNC: 32.7 G/DL (ref 31–37)
MCV RBC AUTO: 93.8 FL
MONOCYTES # BLD AUTO: 0.44 X10(3) UL (ref 0.1–1)
MONOCYTES NFR BLD AUTO: 7 %
NEUTROPHILS # BLD AUTO: 2.66 X10 (3) UL (ref 1.5–7.7)
NEUTROPHILS # BLD AUTO: 2.66 X10(3) UL (ref 1.5–7.7)
NEUTROPHILS NFR BLD AUTO: 42 %
OSMOLALITY SERPL CALC.SUM OF ELEC: 285 MOSM/KG (ref 275–295)
PLATELET # BLD AUTO: 256 10(3)UL (ref 150–450)
POTASSIUM SERPL-SCNC: 4.1 MMOL/L (ref 3.5–5.1)
PROT SERPL-MCNC: 7.3 G/DL (ref 6.4–8.2)
PROTHROMBIN TIME: 13 SECONDS (ref 11.6–14.8)
RBC # BLD AUTO: 4.2 X10(6)UL
SODIUM SERPL-SCNC: 138 MMOL/L (ref 136–145)
TSI SER-ACNC: 1.23 MIU/ML (ref 0.36–3.74)
WBC # BLD AUTO: 6.3 X10(3) UL (ref 4–11)

## 2022-05-28 PROCEDURE — 85610 PROTHROMBIN TIME: CPT

## 2022-05-28 PROCEDURE — 85025 COMPLETE CBC W/AUTO DIFF WBC: CPT

## 2022-05-28 PROCEDURE — 83690 ASSAY OF LIPASE: CPT

## 2022-05-28 PROCEDURE — 80053 COMPREHEN METABOLIC PANEL: CPT

## 2022-05-28 PROCEDURE — 36415 COLL VENOUS BLD VENIPUNCTURE: CPT

## 2022-05-28 PROCEDURE — 84443 ASSAY THYROID STIM HORMONE: CPT

## 2022-06-06 ENCOUNTER — LAB ENCOUNTER (OUTPATIENT)
Dept: LAB | Age: 47
End: 2022-06-06
Attending: STUDENT IN AN ORGANIZED HEALTH CARE EDUCATION/TRAINING PROGRAM
Payer: COMMERCIAL

## 2022-06-06 DIAGNOSIS — Z01.818 PRE-OP TESTING: ICD-10-CM

## 2022-06-07 LAB — SARS-COV-2 RNA RESP QL NAA+PROBE: NOT DETECTED

## 2022-06-09 ENCOUNTER — LAB ENCOUNTER (OUTPATIENT)
Dept: LAB | Facility: HOSPITAL | Age: 47
End: 2022-06-09
Attending: STUDENT IN AN ORGANIZED HEALTH CARE EDUCATION/TRAINING PROGRAM
Payer: COMMERCIAL

## 2022-06-09 DIAGNOSIS — Z20.822 ENCOUNTER FOR PREPROCEDURE SCREENING LABORATORY TESTING FOR COVID-19: ICD-10-CM

## 2022-06-09 DIAGNOSIS — Z01.818 PRE-OP TESTING: ICD-10-CM

## 2022-06-09 DIAGNOSIS — Z01.812 ENCOUNTER FOR PREPROCEDURE SCREENING LABORATORY TESTING FOR COVID-19: ICD-10-CM

## 2022-06-09 LAB — SARS-COV-2 RNA RESP QL NAA+PROBE: NOT DETECTED

## 2022-06-10 PROBLEM — K59.00 CONSTIPATION: Status: ACTIVE | Noted: 2022-06-10

## 2022-06-10 PROBLEM — K92.1 HEMATOCHEZIA: Status: ACTIVE | Noted: 2022-06-10

## 2022-06-10 PROBLEM — R19.7 DIARRHEA: Status: ACTIVE | Noted: 2022-06-10

## 2022-06-10 PROBLEM — Z83.71 FAMILY HISTORY OF COLONIC POLYPS: Status: ACTIVE | Noted: 2022-06-10

## 2022-06-10 PROBLEM — Z83.719 FAMILY HISTORY OF COLONIC POLYPS: Status: ACTIVE | Noted: 2022-06-10

## 2022-06-22 ENCOUNTER — PATIENT MESSAGE (OUTPATIENT)
Dept: INTERNAL MEDICINE CLINIC | Facility: CLINIC | Age: 47
End: 2022-06-22

## 2022-06-23 RX ORDER — MONTELUKAST SODIUM 10 MG/1
10 TABLET ORAL NIGHTLY
Qty: 30 TABLET | Refills: 0 | Status: SHIPPED | OUTPATIENT
Start: 2022-06-23

## 2022-06-23 NOTE — TELEPHONE ENCOUNTER
From: Sekou Comment  To: Lashae Durant MD  Sent: 6/22/2022 9:46 PM CDT  Subject: Need a refill. José Miguel Angulo,   I am not able to get refill for this medication. Montelukast 10 MG Tabs.

## 2022-06-28 ENCOUNTER — OFFICE VISIT (OUTPATIENT)
Dept: INTERNAL MEDICINE CLINIC | Facility: CLINIC | Age: 47
End: 2022-06-28
Payer: COMMERCIAL

## 2022-06-28 VITALS
WEIGHT: 203.81 LBS | HEART RATE: 94 BPM | TEMPERATURE: 99 F | BODY MASS INDEX: 35 KG/M2 | DIASTOLIC BLOOD PRESSURE: 88 MMHG | SYSTOLIC BLOOD PRESSURE: 120 MMHG | OXYGEN SATURATION: 99 %

## 2022-06-28 DIAGNOSIS — Z28.21 IMMUNIZATION CONSENT NOT GIVEN: ICD-10-CM

## 2022-06-28 DIAGNOSIS — M79.671 RIGHT FOOT PAIN: ICD-10-CM

## 2022-06-28 DIAGNOSIS — I10 ESSENTIAL HYPERTENSION, BENIGN: Primary | ICD-10-CM

## 2022-06-28 DIAGNOSIS — T78.40XS ALLERGY, SEQUELA: ICD-10-CM

## 2022-06-28 PROBLEM — R19.7 DIARRHEA: Status: RESOLVED | Noted: 2022-06-10 | Resolved: 2022-06-28

## 2022-06-28 PROBLEM — R59.1 LYMPHADENOPATHY: Status: RESOLVED | Noted: 2022-05-11 | Resolved: 2022-06-28

## 2022-06-28 PROCEDURE — 3074F SYST BP LT 130 MM HG: CPT | Performed by: INTERNAL MEDICINE

## 2022-06-28 PROCEDURE — 90471 IMMUNIZATION ADMIN: CPT | Performed by: INTERNAL MEDICINE

## 2022-06-28 PROCEDURE — 3079F DIAST BP 80-89 MM HG: CPT | Performed by: INTERNAL MEDICINE

## 2022-06-28 PROCEDURE — 99214 OFFICE O/P EST MOD 30 MIN: CPT | Performed by: INTERNAL MEDICINE

## 2022-06-28 PROCEDURE — 90677 PCV20 VACCINE IM: CPT | Performed by: INTERNAL MEDICINE

## 2022-07-01 ENCOUNTER — HOSPITAL ENCOUNTER (OUTPATIENT)
Dept: GENERAL RADIOLOGY | Age: 47
Discharge: HOME | End: 2022-07-01
Attending: INTERNAL MEDICINE
Payer: COMMERCIAL

## 2022-07-01 DIAGNOSIS — M79.671 RIGHT FOOT PAIN: ICD-10-CM

## 2022-07-01 PROCEDURE — 73630 X-RAY EXAM OF FOOT: CPT | Performed by: INTERNAL MEDICINE

## 2022-07-02 DIAGNOSIS — R22.41 MASS OF RIGHT FOOT: Primary | ICD-10-CM

## 2022-07-14 ENCOUNTER — OFFICE VISIT (OUTPATIENT)
Dept: ORTHOPEDICS CLINIC | Facility: CLINIC | Age: 47
End: 2022-07-14
Payer: COMMERCIAL

## 2022-07-14 VITALS — WEIGHT: 198 LBS | HEIGHT: 64.5 IN | BODY MASS INDEX: 33.39 KG/M2

## 2022-07-14 DIAGNOSIS — M79.89 SOFT TISSUE MASS: Primary | ICD-10-CM

## 2022-07-14 PROCEDURE — 3008F BODY MASS INDEX DOCD: CPT | Performed by: PODIATRIST

## 2022-07-14 PROCEDURE — 99203 OFFICE O/P NEW LOW 30 MIN: CPT | Performed by: PODIATRIST

## 2022-07-21 ENCOUNTER — HOSPITAL ENCOUNTER (OUTPATIENT)
Dept: MRI IMAGING | Age: 47
Discharge: HOME | End: 2022-07-21
Attending: INTERNAL MEDICINE
Payer: COMMERCIAL

## 2022-07-21 DIAGNOSIS — R22.41 MASS OF RIGHT FOOT: ICD-10-CM

## 2022-07-21 PROCEDURE — A9575 INJ GADOTERATE MEGLUMI 0.1ML: HCPCS | Performed by: INTERNAL MEDICINE

## 2022-07-21 PROCEDURE — 73720 MRI LWR EXTREMITY W/O&W/DYE: CPT | Performed by: INTERNAL MEDICINE

## 2022-07-27 ENCOUNTER — TELEPHONE (OUTPATIENT)
Dept: INTERNAL MEDICINE CLINIC | Facility: CLINIC | Age: 47
End: 2022-07-27

## 2022-07-27 RX ORDER — MONTELUKAST SODIUM 10 MG/1
10 TABLET ORAL NIGHTLY
Qty: 90 TABLET | Refills: 1 | Status: SHIPPED | OUTPATIENT
Start: 2022-07-27 | End: 2022-07-28

## 2022-07-27 RX ORDER — MONTELUKAST SODIUM 10 MG/1
10 TABLET ORAL NIGHTLY
Qty: 30 TABLET | Refills: 0 | OUTPATIENT
Start: 2022-07-27

## 2022-07-27 NOTE — TELEPHONE ENCOUNTER
DORIAN    Received [facility] from patient today requesting we remove/discontinue Rybelsus from medication record. Medication discontinued.

## 2022-07-28 RX ORDER — CHLORTHALIDONE 25 MG/1
25 TABLET ORAL DAILY
Qty: 90 TABLET | Refills: 3 | OUTPATIENT
Start: 2022-07-28

## 2022-07-28 RX ORDER — AMLODIPINE BESYLATE 5 MG/1
5 TABLET ORAL DAILY
Qty: 90 TABLET | Refills: 3 | OUTPATIENT
Start: 2022-07-28

## 2022-07-28 RX ORDER — LOSARTAN POTASSIUM 100 MG/1
100 TABLET ORAL DAILY
Qty: 90 TABLET | Refills: 3 | OUTPATIENT
Start: 2022-07-28

## 2022-07-28 RX ORDER — FLUTICASONE PROPIONATE 50 MCG
2 SPRAY, SUSPENSION (ML) NASAL DAILY
Qty: 16 G | Refills: 0 | OUTPATIENT
Start: 2022-07-28

## 2022-07-28 NOTE — TELEPHONE ENCOUNTER
Pt requesting refill    amLODIPine 5 MG Oral Tab    losartan 100 MG Oral Tab    fluticasone propionate 50 MCG/ACT Nasal Suspension    chlorthalidone 25 MG Oral Tab    [facility]/pharmacy #[id number] [place], [place] - [address], [phone], [phone]

## 2022-08-17 ENCOUNTER — TELEPHONE (OUTPATIENT)
Dept: ORTHOPEDICS CLINIC | Facility: CLINIC | Age: 47
End: 2022-08-17

## 2022-08-17 NOTE — TELEPHONE ENCOUNTER
Patient is requesting an apt for this week. Patient missed yesterday's apt due to some technical issues she was experiencing with my-chart & her GPS. Patient stated that she was directed to arrive to the [facility].     Patient states she is in a lot of pain due to the mass like lesion on her RT foot. Patient states she is concerned because the pain is causing her blood pressure to rise. Please advise if patient can be overbooked for this week.

## 2022-08-19 ENCOUNTER — OFFICE VISIT (OUTPATIENT)
Dept: ORTHOPEDICS CLINIC | Facility: CLINIC | Age: 47
End: 2022-08-19
Payer: COMMERCIAL

## 2022-08-19 VITALS — WEIGHT: 198 LBS | BODY MASS INDEX: 33.39 KG/M2 | HEIGHT: 64.5 IN

## 2022-08-19 DIAGNOSIS — D18.00 HEMANGIOMA, UNSPECIFIED SITE: Primary | ICD-10-CM

## 2022-08-19 PROCEDURE — 3008F BODY MASS INDEX DOCD: CPT | Performed by: PODIATRIST

## 2022-08-19 PROCEDURE — 99214 OFFICE O/P EST MOD 30 MIN: CPT | Performed by: PODIATRIST

## 2022-08-19 RX ORDER — TOPIRAMATE 25 MG/1
1 TABLET ORAL 2 TIMES DAILY
COMMUNITY
Start: 2022-07-28

## 2022-08-19 RX ORDER — PHENTERMINE HYDROCHLORIDE 8 MG/1
1 TABLET ORAL DAILY
COMMUNITY
Start: 2022-07-28

## 2022-08-19 NOTE — PROGRESS NOTES
[facility] Progress Note    Subjective: The patient returns for discussion of her MRI results of the right foot mass. She is feeling that it is growing and becoming more painful. She has difficulty moving her third and fourth toes and also a warm sensation or hot sensation over the lesion. MRI showed a well encapsulated soft tissue hemangioma that is displacing the extensor tendons    Objective:     Semihard mass persists it is a little larger than last visit. Over the dorsum of the right foot with some mild swelling mild warmth. Mild swelling of the toes and difficulty with extensor use of the toes                  Assessment/Plan:     Diagnoses and all orders for this visit:    Hemangioma, unspecified site        Long discussion about excision of the hemangioma surgically. And think she should proceed with removal and we discussed the process today. This would be under MAC with local and she would have a lot of compression and a brief period of partial weight with either a knee scooter or crutches. She would like to proceed with this in the next several weeks. Discussed the complications potentially including infection, recurrence, loss of sensation or numbness, among others    She would like to proceed    Excision soft tissue hemangioma right foot  MAC with local  September or October 30 mins  [facility] or [facility]  Nothing special needed        Rosie Du. Jose Matos DPM  [facility]    [employer] speech recognition software was used to prepare this note. If a word or phrase is confusing, it is likely do to a failure of recognition. Please contact me with any questions or clarifications.

## 2022-08-29 ENCOUNTER — TELEPHONE (OUTPATIENT)
Dept: ORTHOPEDICS CLINIC | Facility: CLINIC | Age: 47
End: 2022-08-29

## 2022-08-29 NOTE — TELEPHONE ENCOUNTER
----- Message from Davidson stewart.  Vish Aguila DPM sent at 8/19/2022  9:07 AM CDT -----  Regarding: hemangioma  This patient would like surgery  In my notes for today  Thanks  JF

## 2022-09-02 ENCOUNTER — TELEPHONE (OUTPATIENT)
Dept: INTERNAL MEDICINE CLINIC | Facility: CLINIC | Age: 47
End: 2022-09-02

## 2022-09-02 NOTE — TELEPHONE ENCOUNTER
Incoming fax from [address] [place] [address] with pre-op requirements   Patient is having excision of hemangioma on right foot on 9/26/22 with Dr. Roseanna Bob   Please assist patient with scheduling pre-op appt with DV     H&P is the only requirement   Placed in DV upcoming appt folder

## 2022-09-02 NOTE — TELEPHONE ENCOUNTER
Future Appointments   Date Time Provider Wendi Damaris   9/6/2022  3:20 PM Dariana Gregory MD [facility] [place]   9/27/2022  1:00 PM Lilian Sevilla DPM [id number]   11/15/2022  9:20 AM Milagro Macdonald MD [facility]

## 2022-09-06 ENCOUNTER — TELEPHONE (OUTPATIENT)
Dept: INTERNAL MEDICINE CLINIC | Facility: CLINIC | Age: 47
End: 2022-09-06

## 2022-09-06 ENCOUNTER — OFFICE VISIT (OUTPATIENT)
Dept: INTERNAL MEDICINE CLINIC | Facility: CLINIC | Age: 47
End: 2022-09-06
Payer: COMMERCIAL

## 2022-09-06 VITALS
BODY MASS INDEX: 35.51 KG/M2 | WEIGHT: 208 LBS | SYSTOLIC BLOOD PRESSURE: 126 MMHG | RESPIRATION RATE: 16 BRPM | DIASTOLIC BLOOD PRESSURE: 88 MMHG | OXYGEN SATURATION: 99 % | HEIGHT: 64 IN | TEMPERATURE: 100 F | HEART RATE: 99 BPM

## 2022-09-06 DIAGNOSIS — D18.00 HEMANGIOMA, UNSPECIFIED SITE: Primary | ICD-10-CM

## 2022-09-06 DIAGNOSIS — M25.561 CHRONIC PAIN OF BOTH KNEES: ICD-10-CM

## 2022-09-06 DIAGNOSIS — I10 ESSENTIAL HYPERTENSION, BENIGN: ICD-10-CM

## 2022-09-06 DIAGNOSIS — M79.673 PAIN OF FOOT, UNSPECIFIED LATERALITY: ICD-10-CM

## 2022-09-06 DIAGNOSIS — Z01.818 PRE-OP EVALUATION: ICD-10-CM

## 2022-09-06 DIAGNOSIS — M25.562 CHRONIC PAIN OF BOTH KNEES: ICD-10-CM

## 2022-09-06 DIAGNOSIS — G89.29 CHRONIC PAIN OF BOTH KNEES: ICD-10-CM

## 2022-09-06 PROCEDURE — 93000 ELECTROCARDIOGRAM COMPLETE: CPT | Performed by: INTERNAL MEDICINE

## 2022-09-06 PROCEDURE — 99214 OFFICE O/P EST MOD 30 MIN: CPT | Performed by: INTERNAL MEDICINE

## 2022-09-06 PROCEDURE — 3074F SYST BP LT 130 MM HG: CPT | Performed by: INTERNAL MEDICINE

## 2022-09-06 PROCEDURE — 3008F BODY MASS INDEX DOCD: CPT | Performed by: INTERNAL MEDICINE

## 2022-09-06 PROCEDURE — 3079F DIAST BP 80-89 MM HG: CPT | Performed by: INTERNAL MEDICINE

## 2022-09-06 NOTE — TELEPHONE ENCOUNTER
In office EKG DV signed It two copy's were made the original was given to DV. One copy was sent to scan and the other copy was placed in DV accordion.

## 2022-09-16 ENCOUNTER — HOSPITAL ENCOUNTER (OUTPATIENT)
Dept: GENERAL RADIOLOGY | Age: 47
Discharge: HOME | End: 2022-09-16
Attending: INTERNAL MEDICINE
Payer: COMMERCIAL

## 2022-09-16 DIAGNOSIS — G89.29 CHRONIC PAIN OF BOTH KNEES: ICD-10-CM

## 2022-09-16 DIAGNOSIS — M25.561 CHRONIC PAIN OF BOTH KNEES: ICD-10-CM

## 2022-09-16 DIAGNOSIS — M25.562 CHRONIC PAIN OF BOTH KNEES: ICD-10-CM

## 2022-09-16 PROCEDURE — 73562 X-RAY EXAM OF KNEE 3: CPT | Performed by: INTERNAL MEDICINE

## 2022-09-19 ENCOUNTER — TELEPHONE (OUTPATIENT)
Dept: ORTHOPEDICS CLINIC | Facility: CLINIC | Age: 47
End: 2022-09-19

## 2022-09-19 ENCOUNTER — LAB ENCOUNTER (OUTPATIENT)
Dept: LAB | Age: 47
End: 2022-09-19
Attending: INTERNAL MEDICINE

## 2022-09-19 DIAGNOSIS — Z01.818 PRE-OP EVALUATION: ICD-10-CM

## 2022-09-19 DIAGNOSIS — D18.00 HEMANGIOMA, UNSPECIFIED SITE: ICD-10-CM

## 2022-09-19 DIAGNOSIS — M79.673 PAIN OF FOOT, UNSPECIFIED LATERALITY: ICD-10-CM

## 2022-09-19 LAB
ALBUMIN SERPL-MCNC: 3.8 G/DL (ref 3.4–5)
ALBUMIN/GLOB SERPL: 1.1 {RATIO} (ref 1–2)
ALP LIVER SERPL-CCNC: 76 U/L
ALT SERPL-CCNC: 33 U/L
ANION GAP SERPL CALC-SCNC: 1 MMOL/L (ref 0–18)
APTT PPP: 32.5 SECONDS (ref 23.3–35.6)
AST SERPL-CCNC: 21 U/L (ref 15–37)
BASOPHILS # BLD AUTO: 0.06 X10(3) UL (ref 0–0.2)
BASOPHILS NFR BLD AUTO: 0.9 %
BILIRUB SERPL-MCNC: 0.5 MG/DL (ref 0.1–2)
BUN BLD-MCNC: 14 MG/DL (ref 7–18)
CALCIUM BLD-MCNC: 9.1 MG/DL (ref 8.5–10.1)
CHLORIDE SERPL-SCNC: 110 MMOL/L (ref 98–112)
CO2 SERPL-SCNC: 26 MMOL/L (ref 21–32)
CREAT BLD-MCNC: 1.21 MG/DL
EOSINOPHIL # BLD AUTO: 0.31 X10(3) UL (ref 0–0.7)
EOSINOPHIL NFR BLD AUTO: 4.8 %
ERYTHROCYTE [DISTWIDTH] IN BLOOD BY AUTOMATED COUNT: 14.6 %
FASTING STATUS PATIENT QL REPORTED: YES
GFR SERPLBLD BASED ON 1.73 SQ M-ARVRAT: 55 ML/MIN/1.73M2 (ref 60–?)
GLOBULIN PLAS-MCNC: 3.5 G/DL (ref 2.8–4.4)
GLUCOSE BLD-MCNC: 105 MG/DL (ref 70–99)
HCT VFR BLD AUTO: 37.7 %
HGB BLD-MCNC: 12.2 G/DL
IMM GRANULOCYTES # BLD AUTO: 0.02 X10(3) UL (ref 0–1)
IMM GRANULOCYTES NFR BLD: 0.3 %
INR BLD: 1 (ref 0.85–1.16)
LYMPHOCYTES # BLD AUTO: 3.19 X10(3) UL (ref 1–4)
LYMPHOCYTES NFR BLD AUTO: 49.5 %
MCH RBC QN AUTO: 30.1 PG (ref 26–34)
MCHC RBC AUTO-ENTMCNC: 32.4 G/DL (ref 31–37)
MCV RBC AUTO: 93.1 FL
MONOCYTES # BLD AUTO: 0.4 X10(3) UL (ref 0.1–1)
MONOCYTES NFR BLD AUTO: 6.2 %
NEUTROPHILS # BLD AUTO: 2.47 X10 (3) UL (ref 1.5–7.7)
NEUTROPHILS # BLD AUTO: 2.47 X10(3) UL (ref 1.5–7.7)
NEUTROPHILS NFR BLD AUTO: 38.3 %
OSMOLALITY SERPL CALC.SUM OF ELEC: 285 MOSM/KG (ref 275–295)
PLATELET # BLD AUTO: 258 10(3)UL (ref 150–450)
POTASSIUM SERPL-SCNC: 3.6 MMOL/L (ref 3.5–5.1)
PROT SERPL-MCNC: 7.3 G/DL (ref 6.4–8.2)
PROTHROMBIN TIME: 13.2 SECONDS (ref 11.6–14.8)
RBC # BLD AUTO: 4.05 X10(6)UL
SODIUM SERPL-SCNC: 137 MMOL/L (ref 136–145)
WBC # BLD AUTO: 6.5 X10(3) UL (ref 4–11)

## 2022-09-19 PROCEDURE — 85610 PROTHROMBIN TIME: CPT

## 2022-09-19 PROCEDURE — 85730 THROMBOPLASTIN TIME PARTIAL: CPT

## 2022-09-19 PROCEDURE — 80053 COMPREHEN METABOLIC PANEL: CPT

## 2022-09-19 PROCEDURE — 85025 COMPLETE CBC W/AUTO DIFF WBC: CPT

## 2022-09-19 PROCEDURE — 36415 COLL VENOUS BLD VENIPUNCTURE: CPT

## 2022-09-19 NOTE — TELEPHONE ENCOUNTER
Patient is requesting to know how much time she will need off work after her surgery. Patient is requesting a call back as soon as possible as she is trying to request the time off.

## 2022-09-19 NOTE — TELEPHONE ENCOUNTER
Please advise regarding how much time off work would be expected. Please reply back to the [facility] clinical pool. Thank you.

## 2022-09-20 ENCOUNTER — PATIENT MESSAGE (OUTPATIENT)
Dept: ORTHOPEDICS CLINIC | Facility: CLINIC | Age: 47
End: 2022-09-20

## 2022-09-20 NOTE — TELEPHONE ENCOUNTER
From: Ronald Race  Sent: 9/20/2022 9:03 AM CDT  To: [facility] Clinical [facility]  Subject: Time off work    Two weeks? Can you call me please.

## 2022-09-21 ENCOUNTER — TELEPHONE (OUTPATIENT)
Dept: INTERNAL MEDICINE CLINIC | Facility: CLINIC | Age: 47
End: 2022-09-21

## 2022-09-21 NOTE — TELEPHONE ENCOUNTER
Outgoing Fax of EKG and Progress notes to [employer] office.        Received confirmation report     Placed in DV accordion

## 2022-09-23 ENCOUNTER — LAB REQUISITION (OUTPATIENT)
Dept: SURGERY | Age: 47
End: 2022-09-23

## 2022-09-23 DIAGNOSIS — Z01.818 PREOP EXAMINATION: ICD-10-CM

## 2022-09-24 LAB — SARS-COV-2 RNA RESP QL NAA+PROBE: NOT DETECTED

## 2022-09-27 ENCOUNTER — OFFICE VISIT (OUTPATIENT)
Dept: ORTHOPEDICS CLINIC | Facility: CLINIC | Age: 47
End: 2022-09-27

## 2022-09-27 DIAGNOSIS — M79.89 SOFT TISSUE MASS: Primary | ICD-10-CM

## 2022-09-27 PROCEDURE — 99024 POSTOP FOLLOW-UP VISIT: CPT | Performed by: PODIATRIST

## 2022-09-27 RX ORDER — CLINDAMYCIN HYDROCHLORIDE 300 MG/1
300 CAPSULE ORAL 2 TIMES DAILY
Qty: 10 CAPSULE | Refills: 0 | Status: SHIPPED | OUTPATIENT
Start: 2022-09-27

## 2022-09-27 RX ORDER — HYDROCODONE BITARTRATE AND ACETAMINOPHEN 5; 325 MG/1; MG/1
TABLET ORAL
COMMUNITY
Start: 2022-09-26

## 2022-09-27 NOTE — PROGRESS NOTES
[facility] Progress Note    Subjective:     Patient is here 1 day postop excision of deep soft tissue mass, likely hemangioma of the right foot. She has had a lot of pain and some nausea last night. She is having discomfort putting any weight on the foot. Objective: Moderate amount of swelling about the right dorsum of the foot. She is able to wiggle her toes and she has full sensation. Sutures are intact. No drainage. Assessment/Plan:     Diagnoses and all orders for this visit:    Soft tissue mass    Other orders  -     clindamycin 300 MG Oral Cap; Take 1 capsule (300 mg total) by mouth 2 (two) times a day. 5-day course of clindamycin for prophylaxis. Compressive sterile dressing applied. Weightbearing as tolerated in the low-profile boot. I will let them know the results of the pathology. Follow-up in 2 weeks for suture removal.  A lot of icing and elevation            Neha Wylie. Mulu Crabtree DPM  [facility]    [employer] speech recognition software was used to prepare this note. If a word or phrase is confusing, it is likely do to a failure of recognition. Please contact me with any questions or clarifications.

## 2022-10-06 ENCOUNTER — MED REC SCAN ONLY (OUTPATIENT)
Dept: ORTHOPEDICS CLINIC | Facility: CLINIC | Age: 47
End: 2022-10-06

## 2022-10-11 ENCOUNTER — OFFICE VISIT (OUTPATIENT)
Dept: ORTHOPEDICS CLINIC | Facility: CLINIC | Age: 47
End: 2022-10-11
Payer: COMMERCIAL

## 2022-10-11 DIAGNOSIS — D18.00 HEMANGIOMA, UNSPECIFIED SITE: ICD-10-CM

## 2022-10-11 DIAGNOSIS — M79.89 SOFT TISSUE MASS: Primary | ICD-10-CM

## 2022-10-11 PROCEDURE — 99024 POSTOP FOLLOW-UP VISIT: CPT | Performed by: PODIATRIST

## 2022-10-11 RX ORDER — PHENTERMINE HYDROCHLORIDE 15 MG/1
CAPSULE ORAL
COMMUNITY
Start: 2022-10-03

## 2022-10-11 NOTE — PROGRESS NOTES
[facility] Progress Note    Subjective: The patient is here and her  is with her for follow-up of the right foot excision of AV malformation/hemangioma now about 2 weeks. She still having pain about overall is better. She is trying to elevate but is having some tingling and swelling persists        Objective:     Exam right foot incision well coapted and sutures were removed. Moderate swelling persists. She has some tingling to the toes over the dorsum  No warmth no signs of infection                Assessment/Plan:     Diagnoses and all orders for this visit:    Soft tissue mass  -     OP REFERRAL TO [facility]    Hemangioma, unspecified site  -     OP REFERRAL TO [facility]        Reassurance that she is progressing and this was a pretty extensive removal with some dead space so this will require compressive wraps and rest.  Continue with the double boot as she can tolerate  To new compressive wraps and a lot of elevation. Follow-up is in 3 weeks. If in the next 1 to 2 weeks she wants to try to get into a tennis shoe that is fine. Start some physical therapy for swelling control and modalities            Sari Alfred. Dorian Dalton DPM  [facility]    AMDL speech recognition software was used to prepare this note. If a word or phrase is confusing, it is likely do to a failure of recognition. Please contact me with any questions or clarifications.

## 2022-10-17 ENCOUNTER — TELEPHONE (OUTPATIENT)
Dept: PHYSICAL THERAPY | Facility: HOSPITAL | Age: 47
End: 2022-10-17

## 2022-10-25 ENCOUNTER — TELEPHONE (OUTPATIENT)
Dept: PHYSICAL THERAPY | Facility: HOSPITAL | Age: 47
End: 2022-10-25

## 2022-10-27 ENCOUNTER — OFFICE VISIT (OUTPATIENT)
Dept: PHYSICAL THERAPY | Age: 47
End: 2022-10-27
Attending: PODIATRIST
Payer: COMMERCIAL

## 2022-10-27 DIAGNOSIS — D18.00 HEMANGIOMA, UNSPECIFIED SITE: ICD-10-CM

## 2022-10-27 DIAGNOSIS — M79.89 SOFT TISSUE MASS: ICD-10-CM

## 2022-10-27 PROCEDURE — 97161 PT EVAL LOW COMPLEX 20 MIN: CPT

## 2022-10-27 PROCEDURE — 97110 THERAPEUTIC EXERCISES: CPT

## 2022-10-27 RX ORDER — FLUTICASONE PROPIONATE 50 MCG
SPRAY, SUSPENSION (ML) NASAL
Qty: 16 ML | Refills: 0 | Status: SHIPPED | OUTPATIENT
Start: 2022-10-27

## 2022-10-27 NOTE — TELEPHONE ENCOUNTER
LOV: 9/6/2022 with Dr. Gaston Tang  RTC: no follow-up on file  Last Relevant Labs: 9/19/2022  Filled: 7/28/2022    #16 g with 0 refills    Future Appointments   Date Time Provider Wendi Venturai   10/27/2022  4:30 PM Barbara Otter, PT [facility] [place]   11/1/2022  9:30 AM Natali Rinaldi, DPM [facility] [id number]   11/3/2022  9:30 AM Barabra Otter, PT [facility] [place]   11/8/2022  3:45 PM Sauder, Sim Bevel, PT [facility] [place]   11/10/2022  6:45 PM Barbara Otter, PT [facility] [place]   11/15/2022  9:20 AM Sammuel Heimlich, MD [facility]   11/17/2022  5:15 PM Barbara Otter, PT [facility] [place]   11/22/2022  5:15 PM Sauder, Sim Bevel, PT [facility] [place]   11/29/2022  6:45 PM Sauder, Sim Bevel, PT [facility] [place]   12/1/2022  6:45 PM Sauder, Sim Bevel, PT [facility] [place]   12/6/2022  6:45 PM Sauder, Sim Bevel, PT [facility] [place]   12/8/2022  6:45 PM Sauder, Sim Bevel, PT [facility] [place]   12/13/2022  6:00 PM Sauder, Sim Bevel, PT [facility] [place]   12/15/2022  6:00 PM Sauder, Sim Bevel, PT [facility] [place]

## 2022-11-01 ENCOUNTER — OFFICE VISIT (OUTPATIENT)
Dept: ORTHOPEDICS CLINIC | Facility: CLINIC | Age: 47
End: 2022-11-01
Payer: COMMERCIAL

## 2022-11-01 VITALS — WEIGHT: 208 LBS | BODY MASS INDEX: 35.08 KG/M2 | HEIGHT: 64.5 IN

## 2022-11-01 DIAGNOSIS — D18.00 HEMANGIOMA, UNSPECIFIED SITE: Primary | ICD-10-CM

## 2022-11-01 DIAGNOSIS — Z48.89 AFTERCARE FOLLOWING SURGERY: ICD-10-CM

## 2022-11-01 PROCEDURE — 99024 POSTOP FOLLOW-UP VISIT: CPT | Performed by: PODIATRIST

## 2022-11-01 PROCEDURE — 3008F BODY MASS INDEX DOCD: CPT | Performed by: PODIATRIST

## 2022-11-01 NOTE — PROGRESS NOTES
[facility] Progress Note    Subjective:     Sukumar Live Is here about 1 month post hemangioma excision of the right foot  She is doing better but still having some burning and some numbness of the fourth and third toes. She is trying to get into different shoes. She also has been into physical therapy 2 sessions now      Objective: Incision is well-healed. There is a little superficial gapping but minimal.  No signs of infection. Mild swelling. She has some loss of sensation of the third and fourth toes at the dorsum of the toes and a little interdigitally. This is improving. Moderate amount of swelling is noted. Assessment/Plan:     Diagnoses and all orders for this visit:    Hemangioma, unspecified site    Aftercare following surgery        She is doing well and she is finally progressing. I want her to continue in physical therapy for swelling control scar massage and strengthening. I would anticipate with time the numbness starts to resolve but long-term she could have a loss of feeling because of the location and the size of the hemangioma. She understands that. Try to get into a regular shoe that size stiff sole and soft top. Start to do a little more activity. Follow-up with me in 1 month            Tonja Delarosa. Kay Broderick DPM  [facility]    NextGxDX speech recognition software was used to prepare this note. If a word or phrase is confusing, it is likely do to a failure of recognition. Please contact me with any questions or clarifications.

## 2022-11-03 ENCOUNTER — OFFICE VISIT (OUTPATIENT)
Dept: PHYSICAL THERAPY | Age: 47
End: 2022-11-03
Attending: PODIATRIST
Payer: COMMERCIAL

## 2022-11-03 PROCEDURE — 97110 THERAPEUTIC EXERCISES: CPT

## 2022-11-03 PROCEDURE — 97140 MANUAL THERAPY 1/> REGIONS: CPT

## 2022-11-08 ENCOUNTER — OFFICE VISIT (OUTPATIENT)
Dept: PHYSICAL THERAPY | Age: 47
End: 2022-11-08
Attending: PODIATRIST
Payer: COMMERCIAL

## 2022-11-08 PROCEDURE — 97110 THERAPEUTIC EXERCISES: CPT

## 2022-11-08 PROCEDURE — 97140 MANUAL THERAPY 1/> REGIONS: CPT

## 2022-11-10 ENCOUNTER — OFFICE VISIT (OUTPATIENT)
Dept: PHYSICAL THERAPY | Age: 47
End: 2022-11-10
Attending: PODIATRIST
Payer: COMMERCIAL

## 2022-11-10 PROCEDURE — 97035 APP MDLTY 1+ULTRASOUND EA 15: CPT

## 2022-11-10 PROCEDURE — 97140 MANUAL THERAPY 1/> REGIONS: CPT

## 2022-11-17 ENCOUNTER — OFFICE VISIT (OUTPATIENT)
Dept: PHYSICAL THERAPY | Age: 47
End: 2022-11-17
Attending: PODIATRIST
Payer: COMMERCIAL

## 2022-11-17 PROCEDURE — 97140 MANUAL THERAPY 1/> REGIONS: CPT

## 2022-11-17 PROCEDURE — 97110 THERAPEUTIC EXERCISES: CPT

## 2022-11-22 ENCOUNTER — OFFICE VISIT (OUTPATIENT)
Dept: PHYSICAL THERAPY | Age: 47
End: 2022-11-22
Attending: PODIATRIST
Payer: COMMERCIAL

## 2022-11-22 PROCEDURE — 97110 THERAPEUTIC EXERCISES: CPT

## 2022-11-22 PROCEDURE — 97140 MANUAL THERAPY 1/> REGIONS: CPT

## 2022-11-25 RX ORDER — FLUTICASONE PROPIONATE 50 MCG
SPRAY, SUSPENSION (ML) NASAL
Qty: 16 ML | Refills: 0 | Status: SHIPPED | OUTPATIENT
Start: 2022-11-25

## 2022-11-29 ENCOUNTER — APPOINTMENT (OUTPATIENT)
Dept: PHYSICAL THERAPY | Age: 47
End: 2022-11-29
Attending: PODIATRIST
Payer: COMMERCIAL

## 2022-12-01 ENCOUNTER — OFFICE VISIT (OUTPATIENT)
Dept: PHYSICAL THERAPY | Age: 47
End: 2022-12-01
Attending: PODIATRIST
Payer: COMMERCIAL

## 2022-12-01 PROCEDURE — 97110 THERAPEUTIC EXERCISES: CPT

## 2022-12-01 PROCEDURE — 97140 MANUAL THERAPY 1/> REGIONS: CPT

## 2022-12-02 ENCOUNTER — TELEPHONE (OUTPATIENT)
Dept: ORTHOPEDICS CLINIC | Facility: CLINIC | Age: 47
End: 2022-12-02

## 2022-12-02 DIAGNOSIS — M25.562 PAIN IN BOTH KNEES, UNSPECIFIED CHRONICITY: Primary | ICD-10-CM

## 2022-12-02 DIAGNOSIS — M25.561 PAIN IN BOTH KNEES, UNSPECIFIED CHRONICITY: Primary | ICD-10-CM

## 2022-12-02 NOTE — TELEPHONE ENCOUNTER
Patient is scheduled with Dr. David Castro for bilateral knee pain. Please advise if imaging is needed.

## 2022-12-02 NOTE — TELEPHONE ENCOUNTER
Future Appointments   Date Time Provider Wendi Damaris   12/16/2022  1:25 PM [facility]   12/16/2022  2:40 PM [facility]   12/16/2022  3:20 PM Archie Lynn [place] [id number]   Patient is scheduled for xray and advised to complete prior to appt.

## 2022-12-06 ENCOUNTER — TELEPHONE (OUTPATIENT)
Dept: PHYSICAL THERAPY | Facility: HOSPITAL | Age: 47
End: 2022-12-06

## 2022-12-06 ENCOUNTER — OFFICE VISIT (OUTPATIENT)
Dept: ORTHOPEDICS CLINIC | Facility: CLINIC | Age: 47
End: 2022-12-06
Payer: COMMERCIAL

## 2022-12-06 ENCOUNTER — APPOINTMENT (OUTPATIENT)
Dept: PHYSICAL THERAPY | Age: 47
End: 2022-12-06
Attending: PODIATRIST
Payer: COMMERCIAL

## 2022-12-06 ENCOUNTER — MED REC SCAN ONLY (OUTPATIENT)
Dept: ORTHOPEDICS CLINIC | Facility: CLINIC | Age: 47
End: 2022-12-06

## 2022-12-06 DIAGNOSIS — D18.00 HEMANGIOMA, UNSPECIFIED SITE: Primary | ICD-10-CM

## 2022-12-06 DIAGNOSIS — R20.0 NUMBNESS AND TINGLING OF FOOT: ICD-10-CM

## 2022-12-06 DIAGNOSIS — R20.2 NUMBNESS AND TINGLING OF FOOT: ICD-10-CM

## 2022-12-06 DIAGNOSIS — Z48.89 AFTERCARE FOLLOWING SURGERY: ICD-10-CM

## 2022-12-06 PROCEDURE — 99024 POSTOP FOLLOW-UP VISIT: CPT | Performed by: PODIATRIST

## 2022-12-06 RX ORDER — SODIUM FLUORIDE1.1%, POTASSIUM NITRATE 5% 5.8; 57.5 MG/ML; MG/ML
GEL, DENTIFRICE DENTAL
COMMUNITY
Start: 2022-11-16

## 2022-12-06 RX ORDER — METFORMIN HYDROCHLORIDE 500 MG/1
500 TABLET, EXTENDED RELEASE ORAL
COMMUNITY
Start: 2022-11-27

## 2022-12-06 NOTE — PROGRESS NOTES
[facility] Progress Note    Subjective:     Elif Valentin is here now almost 3 months postop excision of hemangioma right foot. She still having some tingling and numbness to the toes and having some discomfort. She has been through a lot of physical therapy and it is helpful. Overall she is better        Objective:     Exam incision is well-healed dorsum of the right foot. She still has a little tingling with light tapping over the incision. And distally she does have discomfort in the interspace. Third and fourth toes have some numbness at the tip                  Assessment/Plan:     Diagnoses and all orders for this visit:    Hemangioma, unspecified site    Aftercare following surgery      Hopefully she will find with every few weeks she is better, with less swelling and less tingling. This may take close to 1 year for resolution  Use voltaren gel more often    Will try for compounding Rx (Verla Jesus Manuel) , neuropathic components in the cream    3 months follow up            Pili Lovell. Randi Whitney DPM  [facility]    [employer] speech recognition software was used to prepare this note. If a word or phrase is confusing, it is likely do to a failure of recognition. Please contact me with any questions or clarifications.

## 2022-12-08 ENCOUNTER — APPOINTMENT (OUTPATIENT)
Dept: PHYSICAL THERAPY | Age: 47
End: 2022-12-08
Attending: PODIATRIST
Payer: COMMERCIAL

## 2022-12-13 ENCOUNTER — PATIENT MESSAGE (OUTPATIENT)
Dept: INTERNAL MEDICINE CLINIC | Facility: CLINIC | Age: 47
End: 2022-12-13

## 2022-12-13 ENCOUNTER — OFFICE VISIT (OUTPATIENT)
Dept: PHYSICAL THERAPY | Age: 47
End: 2022-12-13
Attending: PODIATRIST
Payer: COMMERCIAL

## 2022-12-13 PROCEDURE — 97110 THERAPEUTIC EXERCISES: CPT

## 2022-12-13 PROCEDURE — 97140 MANUAL THERAPY 1/> REGIONS: CPT

## 2022-12-13 NOTE — TELEPHONE ENCOUNTER
From: Saurabh Panda  To: Keshawn Nova MD  Sent: 12/13/2022 11:39 AM CST  Subject: Break out on skin    Hi Dr. Lelia Arita,  I am experiencing an breakout on my body. No too extreme, but it's really getting to be irritating and really itchy. Not sure where it comes from. I've tried Benadryl and hydrocortisone ointment OTC. I'm not sure what to do at this point. I have a sinus infection which will not go away, but I don't think it comes from that. I haven't eaten anything new, to have an allergic reaction to something. I need help.

## 2022-12-13 NOTE — TELEPHONE ENCOUNTER
Appt scheduled.   Future Appointments   Date Time Provider Wendi Ocampo   12/13/2022  6:00 PM Brina Raphael [facility] [place]   12/14/2022 11:40 AM Dariana Gregory MD [facility] 8 [facility] [place]   12/15/2022  6:00 PM Gabe Rodney, FRANCIS [facility] PH[facility] T [place]   12/16/2022  1:25 PM WDR XR RM1 WDR XRAY [facility] [place]   12/16/2022  2:40 PM WDR XR RM1 WDR XRAY [facility] [place]   12/16/2022  3:20 PM Sebastian Beach MD [facility] ORTHO Wo [id number]   12/22/2022 12:30 PM Chelsie Dyer, FRANCIS [facility] [place]   2/9/2023 12:40 PM MJ Tellez EMGWEI [id number]   2/24/2023  9:00 AM Milagro Macdonald MD [facility]   3/7/2023  3:30 PM Lilian Sevilla, DPM [facility] [place] [id number]

## 2022-12-14 ENCOUNTER — OFFICE VISIT (OUTPATIENT)
Dept: INTERNAL MEDICINE CLINIC | Facility: CLINIC | Age: 47
End: 2022-12-14
Payer: COMMERCIAL

## 2022-12-14 ENCOUNTER — EKG ENCOUNTER (OUTPATIENT)
Dept: LAB | Age: 47
End: 2022-12-14
Attending: INTERNAL MEDICINE
Payer: COMMERCIAL

## 2022-12-14 ENCOUNTER — LAB ENCOUNTER (OUTPATIENT)
Dept: LAB | Age: 47
End: 2022-12-14
Attending: STUDENT IN AN ORGANIZED HEALTH CARE EDUCATION/TRAINING PROGRAM
Payer: COMMERCIAL

## 2022-12-14 VITALS
TEMPERATURE: 98 F | OXYGEN SATURATION: 98 % | DIASTOLIC BLOOD PRESSURE: 66 MMHG | WEIGHT: 220 LBS | RESPIRATION RATE: 17 BRPM | BODY MASS INDEX: 38 KG/M2 | HEART RATE: 102 BPM | SYSTOLIC BLOOD PRESSURE: 120 MMHG

## 2022-12-14 DIAGNOSIS — R14.0 BLOATING: ICD-10-CM

## 2022-12-14 DIAGNOSIS — L29.9 PRURITIC CONDITION: ICD-10-CM

## 2022-12-14 DIAGNOSIS — R00.0 TACHYCARDIA: ICD-10-CM

## 2022-12-14 DIAGNOSIS — R12 HEARTBURN: ICD-10-CM

## 2022-12-14 DIAGNOSIS — J45.20 MILD INTERMITTENT ASTHMA WITHOUT COMPLICATION: ICD-10-CM

## 2022-12-14 DIAGNOSIS — L30.9 DERMATITIS: ICD-10-CM

## 2022-12-14 DIAGNOSIS — T78.40XA ALLERGY, INITIAL ENCOUNTER: Primary | ICD-10-CM

## 2022-12-14 LAB
ANION GAP SERPL CALC-SCNC: 9 MMOL/L (ref 0–18)
BUN BLD-MCNC: 13 MG/DL (ref 7–18)
CALCIUM BLD-MCNC: 10.2 MG/DL (ref 8.5–10.1)
CHLORIDE SERPL-SCNC: 107 MMOL/L (ref 98–112)
CO2 SERPL-SCNC: 23 MMOL/L (ref 21–32)
CREAT BLD-MCNC: 1.16 MG/DL
FASTING STATUS PATIENT QL REPORTED: NO
GFR SERPLBLD BASED ON 1.73 SQ M-ARVRAT: 58 ML/MIN/1.73M2 (ref 60–?)
GLUCOSE BLD-MCNC: 144 MG/DL (ref 70–99)
OSMOLALITY SERPL CALC.SUM OF ELEC: 291 MOSM/KG (ref 275–295)
POTASSIUM SERPL-SCNC: 4.4 MMOL/L (ref 3.5–5.1)
SODIUM SERPL-SCNC: 139 MMOL/L (ref 136–145)

## 2022-12-14 PROCEDURE — 80048 BASIC METABOLIC PNL TOTAL CA: CPT

## 2022-12-14 PROCEDURE — 99214 OFFICE O/P EST MOD 30 MIN: CPT | Performed by: INTERNAL MEDICINE

## 2022-12-14 PROCEDURE — 3074F SYST BP LT 130 MM HG: CPT | Performed by: INTERNAL MEDICINE

## 2022-12-14 PROCEDURE — 36415 COLL VENOUS BLD VENIPUNCTURE: CPT

## 2022-12-14 PROCEDURE — 3078F DIAST BP <80 MM HG: CPT | Performed by: INTERNAL MEDICINE

## 2022-12-14 RX ORDER — CETIRIZINE HYDROCHLORIDE 10 MG/1
10 TABLET ORAL DAILY
Qty: 90 TABLET | Refills: 0 | Status: SHIPPED | OUTPATIENT
Start: 2022-12-14

## 2022-12-14 RX ORDER — TRIAMCINOLONE ACETONIDE 1 MG/G
CREAM TOPICAL 2 TIMES DAILY PRN
Qty: 60 G | Refills: 0 | Status: SHIPPED | OUTPATIENT
Start: 2022-12-14

## 2022-12-14 RX ORDER — HYDROXYZINE HYDROCHLORIDE 10 MG/1
10 TABLET, FILM COATED ORAL 3 TIMES DAILY PRN
Qty: 30 TABLET | Refills: 0 | Status: SHIPPED | OUTPATIENT
Start: 2022-12-14

## 2022-12-14 RX ORDER — ALBUTEROL SULFATE 90 UG/1
2 AEROSOL, METERED RESPIRATORY (INHALATION) EVERY 4 HOURS PRN
Qty: 3 EACH | Refills: 2 | Status: SHIPPED | OUTPATIENT
Start: 2022-12-14

## 2022-12-15 ENCOUNTER — APPOINTMENT (OUTPATIENT)
Dept: PHYSICAL THERAPY | Age: 47
End: 2022-12-15
Attending: PODIATRIST
Payer: COMMERCIAL

## 2022-12-15 NOTE — PROGRESS NOTES
[place],    Your creatinine, which can measure your kidney function, and your blood sugar is abnormal. I recommend you follow-up with your primary care physician for further evaluation of this. Please let me know if you have any questions or concerns.      Sincerely,  Stacie Riley MD

## 2022-12-16 ENCOUNTER — HOSPITAL ENCOUNTER (OUTPATIENT)
Dept: GENERAL RADIOLOGY | Age: 47
Discharge: HOME | End: 2022-12-16
Attending: ORTHOPAEDIC SURGERY
Payer: COMMERCIAL

## 2022-12-16 ENCOUNTER — HOSPITAL ENCOUNTER (OUTPATIENT)
Dept: GENERAL RADIOLOGY | Age: 47
End: 2022-12-16
Attending: ORTHOPAEDIC SURGERY
Payer: COMMERCIAL

## 2022-12-16 ENCOUNTER — OFFICE VISIT (OUTPATIENT)
Dept: ORTHOPEDICS CLINIC | Facility: CLINIC | Age: 47
End: 2022-12-16
Payer: COMMERCIAL

## 2022-12-16 VITALS — HEART RATE: 94 BPM | WEIGHT: 215 LBS | BODY MASS INDEX: 36.26 KG/M2 | OXYGEN SATURATION: 99 % | HEIGHT: 64.5 IN

## 2022-12-16 DIAGNOSIS — M25.561 PAIN IN BOTH KNEES, UNSPECIFIED CHRONICITY: ICD-10-CM

## 2022-12-16 DIAGNOSIS — M25.562 PAIN IN BOTH KNEES, UNSPECIFIED CHRONICITY: ICD-10-CM

## 2022-12-16 DIAGNOSIS — M25.562 PAIN IN BOTH KNEES, UNSPECIFIED CHRONICITY: Primary | ICD-10-CM

## 2022-12-16 DIAGNOSIS — M25.561 PAIN IN BOTH KNEES, UNSPECIFIED CHRONICITY: Primary | ICD-10-CM

## 2022-12-16 PROCEDURE — 73564 X-RAY EXAM KNEE 4 OR MORE: CPT | Performed by: ORTHOPAEDIC SURGERY

## 2022-12-16 PROCEDURE — 99204 OFFICE O/P NEW MOD 45 MIN: CPT | Performed by: ORTHOPAEDIC SURGERY

## 2022-12-16 PROCEDURE — 3008F BODY MASS INDEX DOCD: CPT | Performed by: ORTHOPAEDIC SURGERY

## 2022-12-16 PROCEDURE — 20610 DRAIN/INJ JOINT/BURSA W/O US: CPT | Performed by: ORTHOPAEDIC SURGERY

## 2022-12-16 RX ORDER — TRIAMCINOLONE ACETONIDE 40 MG/ML
80 INJECTION, SUSPENSION INTRA-ARTICULAR; INTRAMUSCULAR ONCE
Status: COMPLETED | OUTPATIENT
Start: 2022-12-16 | End: 2022-12-16

## 2022-12-16 RX ADMIN — TRIAMCINOLONE ACETONIDE 80 MG: 40 INJECTION, SUSPENSION INTRA-ARTICULAR; INTRAMUSCULAR at 16:30:00

## 2022-12-16 NOTE — PROCEDURES
After informed consent, the patient's right and left knees were marked, locally anesthetized with skin refrigerant, prepped with topical antiseptic, and injected with a mixture of 1mL 40mg/mL Kenalog, 2mL 1% lidocaine and 2mL 0.5% marcaine through the inferolateral portal.  A band-aid was applied. The patient tolerated the procedure well.     Dianne Pizarro MD, [address] [facility]  Phone [phone]  Fax [phone]

## 2022-12-20 ENCOUNTER — PATIENT MESSAGE (OUTPATIENT)
Dept: INTERNAL MEDICINE CLINIC | Facility: CLINIC | Age: 47
End: 2022-12-20

## 2022-12-20 NOTE — TELEPHONE ENCOUNTER
Sent in. Should not use for more than 2 weeks. Follow-up with allergy/immnunology regarding allergies.

## 2022-12-20 NOTE — TELEPHONE ENCOUNTER
From: Garcia Jiang  To: Jose Barron MD  Sent: 12/20/2022 1:58 PM CST  Subject: Face breakout     Hi, I want to know since I can't use the cream Triamcinolone Acetonide cream on my face. Can up prescribe Hydrocortisone cream 2.5%?

## 2022-12-22 ENCOUNTER — APPOINTMENT (OUTPATIENT)
Dept: PHYSICAL THERAPY | Age: 47
End: 2022-12-22
Attending: PODIATRIST
Payer: COMMERCIAL

## 2022-12-29 ENCOUNTER — TELEPHONE (OUTPATIENT)
Dept: INTERNAL MEDICINE CLINIC | Facility: CLINIC | Age: 47
End: 2022-12-29

## 2022-12-30 ENCOUNTER — TELEPHONE (OUTPATIENT)
Dept: INTERNAL MEDICINE CLINIC | Facility: CLINIC | Age: 47
End: 2022-12-30

## 2022-12-30 NOTE — TELEPHONE ENCOUNTER
Incoming fax from  [address] [place] [address].   They are requesting all labs, placed in Dr. Lion Meza for review and approval.

## 2023-01-04 LAB
ATRIAL RATE: 93 BPM
P AXIS: 61 DEGREES
P-R INTERVAL: 160 MS
Q-T INTERVAL: 364 MS
QRS DURATION: 74 MS
QTC CALCULATION (BEZET): 452 MS
R AXIS: 40 DEGREES
T AXIS: 67 DEGREES
VENTRICULAR RATE: 93 BPM

## 2023-01-06 DIAGNOSIS — R94.31 T WAVE INVERSION IN EKG: Primary | ICD-10-CM

## 2023-02-09 ENCOUNTER — OFFICE VISIT (OUTPATIENT)
Dept: INTERNAL MEDICINE CLINIC | Facility: CLINIC | Age: 48
End: 2023-02-09
Payer: COMMERCIAL

## 2023-02-09 VITALS
BODY MASS INDEX: 36.26 KG/M2 | HEART RATE: 104 BPM | RESPIRATION RATE: 16 BRPM | SYSTOLIC BLOOD PRESSURE: 134 MMHG | WEIGHT: 215 LBS | OXYGEN SATURATION: 98 % | DIASTOLIC BLOOD PRESSURE: 70 MMHG | HEIGHT: 64.5 IN

## 2023-02-09 DIAGNOSIS — I10 ESSENTIAL HYPERTENSION, BENIGN: ICD-10-CM

## 2023-02-09 DIAGNOSIS — Z51.81 THERAPEUTIC DRUG MONITORING: Primary | ICD-10-CM

## 2023-02-09 DIAGNOSIS — E66.9 OBESITY (BMI 30-39.9): ICD-10-CM

## 2023-02-09 DIAGNOSIS — K59.00 CONSTIPATION, UNSPECIFIED CONSTIPATION TYPE: ICD-10-CM

## 2023-02-09 DIAGNOSIS — R73.03 PREDIABETES: ICD-10-CM

## 2023-02-09 PROCEDURE — 99204 OFFICE O/P NEW MOD 45 MIN: CPT | Performed by: NURSE PRACTITIONER

## 2023-02-09 PROCEDURE — 3075F SYST BP GE 130 - 139MM HG: CPT | Performed by: NURSE PRACTITIONER

## 2023-02-09 PROCEDURE — 3078F DIAST BP <80 MM HG: CPT | Performed by: NURSE PRACTITIONER

## 2023-02-09 PROCEDURE — 3008F BODY MASS INDEX DOCD: CPT | Performed by: NURSE PRACTITIONER

## 2023-02-09 NOTE — PATIENT INSTRUCTIONS
Welcome to the [facility] Program!!  Thank you for placing your trust in our health care team, I look forward to working with you along this journey to better health! Next steps:     1.  Schedule a personal nutrition consultation with one of our registered dieticians. Bring along your food journal (3 days minimum). See journal options below. 2.  Fill your prescribed medication and take as discussed and prescribed:  Will trial wegovy 0.25mg weekly X 4 weeks (sample, demo given and 0.5mg sent to pharm)      Please try to work on the following dietary changes this first month:  Daily protein recommendation to start:  grams  Daily carbohydrate: <115g  Daily calories: 1,400  1. Drink water with meals and throughout the day, cut down on soda and/or juice if consumed. Consider flavored water options like Bubbly, Spindrift, Hint and Naren. 2.  Eat breakfast daily and focus on having protein with each meal, examples include: greek yogurt, cottage cheese, hard boiled egg, whole grain toast with peanut butter. 3.  Reduce refined carbohydrates and sugars which includes items such as sweets, as well as rice, pasta, and bread and make sure to choose whole grain options when having them with just 1 serving per meal about the size of your inner palm. 4.  Consume non starchy veggies daily working towards making them a good 50% of your daily food intake. Add them to lunch and dinner consistently. 5.  Start a daily probiotic: VSL#3 is recommended, (order on line at www.vsl3. com). Take 1 capsule daily with water for 30 days, then reduce to 1 every other day (this will reduce the cost). Capsules can be left out for 2 weeks, but then must be refrigerated. Please download stella My Fitness Ryan Padron! Or Net Diary to monitor daily dietary intake and you will be able to see if you are eating the right amount of calories or too much or too little which would hinder weight loss.  Additionally this will help to see your daily carbohydrate and protein intake. When you set the leif up choose 1-2 lbs/week as a goal.  Keeping a paper food journal is an option as well to remain accountable for your choices- this is the start to mindful eating! A low calorie diet has been consistently shown to support weight loss. Continue or start exercising to help establish a routine. If not already exercising begin with 1 day and progress as able with long-term goal of 30 minutes 5 days a week at a minimum. Meditation daily can help manage and control stress. Chronic stress can make weight loss difficult. Exercising is one way to help with stress, but meditation using the CALM Leif or another comparable alternative can be done in your home or place of work with little time commitment. This Leif can also help work on behavior change and improve sleep. Check out the segment under Calm Masterclass and listen to The 4 Pillars of Health. A great way to begin learning about the foundation of lifestyle with practical tips to use in your every day. Check out www.yourweightmatters. org blog for continued daily support and education along this weight loss journey! Patient Resources:     Personal Training/Fitness Classes/Health Coaching     Tra Carlton and Hernandez Sophiaside @ [email or web address] Full fitness center with group fitness and personal training. Discount available as client of [facility]. Health Coaching and Personal Training with Melody Valencia at our [facility]- individual weekly coaching with option to add personal training and small group fitness classes targeted at weight loss- [phone] and/or email @ Ana Cristina Green. [email or web address]  360FIT Baylee [email or web address] Group Fitness [phone] and/or email Aysha Isaacs at [email or web address]  [address] with multiple locations: Deja (www.Energy Management & Security Solutions), Eat The Frog Fitness (www.eatLeonardo Worldwide Corporation. Provender), Fit Body Bootcamp (www.fitbodybootPresslyp.Provender), trinket Fitness (www.HDB Newco), The Exercise  (www.exercisecoach.Provender)     Online Fitness  Fitness  on [employer] in 10 DVD series- www. pwang12NMA. Provender  Sit and Be Fit - Chair exercise series Www.sitandbefit. org  Hip Hop Fit with Alonzo Thrasher at www.hiphopfit. net     Apps for on the Progressive Care 7 Minute Workout (orange box with white 7) - free on the go HIIT training leif  Peloton Leif @ Mobile Media Info Tech Limited     Nutrition Trackers and Tools  LoseIT! And My Fitness Pal apps and on line for tracking nutrition  NOOM - virtual health coaching  FitFoundation (healthy meals on the go) in [facility] @ Ilusis [email or web address]. Mario Reynolds MD @ Novavax and Joey Sanchez (keto and low carb plans recommended) @ www. LCVLRQ52.CAD, Metabolic Meals @ www. MyMetabolicMeals. com - individual prepared meals to go  Answerology, Jumpstarter, [employer] Machines, Every Plate, KCAP Services- on line meal delivery programs for preparation at home  [place] Dormify in [place] for homemade meals to go @ www.RackHunt. Provender  Diet Doctor @ www. dietdoctor. com - low carb swaps  NeedFeed - meal prep and planning leif (www.yummly. com)     Stress Management/Behavior/Mindful Eating  CALM meditation leif (www.calm. Provender)  Headspace  Am I Hungry? Mindful eating virtual  leif  Www.yourweightmatters. org - Obesity Action Coalition sponsored Blog posts daily  Motivation leif (black box with white \")- daily supportive messages sent to your phone     Books/Video Education/Podcasts  Mindless Eating by Annalise Olvera  Why We Get Sick by Handy Salinas (a book about insulin resistance)  Atomic Habits by Sri Lentz (a book about taking small steps to promote greater behavior change)   Can't Hurt Me by Kaylan Salcedo (a book exploring the power of discipline in achieving your goals)  The End of Dieting:  How to Live for Life by Dr. Kassandra Kennedy M.D. or listen to The [address] [facility] Episode 63: Understanding \"Nutritarian\" Eating w/Dr. Petra Lamas  Your Body in Balance: The TuneIn Twitter Dashboard of Food, Hormones, and Health by Dr. Brendan Castellanos  The Menopause Diet Plan by Bret White and [facility] H[facility] AT [facility]  The Complete Guide to fasting by Dr. Francie Pritchard, [address] [facility] by Larisa Byrnes, Ph.D, R.D. Weight Loss Surgery Will Not Treat Food Addiction by Chidi Luther Ph.D  The [address] [place] [address] on plant based nutrition  Fed Up - documentary about obesity (Free on Gemisimon)  The Truth About Sugar - documentary on sugar (Free on SouthWing, https://youtu. be/7G9bucwQC2d)  The Dr. Miller Rider by Dr. Nedra Benedict MD  Fitlosophy Fitspiration - journal to better health (found at [employer] in fitness aisle)  What Happened to You?- a look at the impact trauma has on behavior written by Jean Claude Ordonez and Dr. Fuentes Rolling Again by Ariadna Coleman - discovering your true self after trauma  Michael Vizcaino talk on Ulympix, The Call to Courage  Podcasts: The Exam Room by the Physician's Committee, Nutrition Facts by Dr. Darshan Vance    We are here to support you with weight loss, but please remember that you still need your primary care provider for your routine health maintenance.

## 2023-02-16 ENCOUNTER — DOCUMENTATION ONLY (OUTPATIENT)
Dept: PHYSICAL THERAPY | Age: 48
End: 2023-02-16

## 2023-02-16 NOTE — PROGRESS NOTES
Discharge Summary    Pt has attended 8 visits in Physical Therapy with their last visit 12/13/22. Pt cancelled her last visits. Pt is therefore d/c at this time. Please refer to daily note from 12/13/22 for further pt assessment. Thank you for your referral. If you have any questions, please contact me at Dept: [phone].     Sincerely,  Electronically signed by therapist: Singh Bradford, PT     Physician's certification required:  No

## 2023-03-06 ENCOUNTER — PATIENT MESSAGE (OUTPATIENT)
Dept: INTERNAL MEDICINE CLINIC | Facility: CLINIC | Age: 48
End: 2023-03-06

## 2023-03-06 NOTE — TELEPHONE ENCOUNTER
From: Ramez Lopez  To: MJ Terrazas  Sent: 3/6/2023 2:21 PM CST  Subject: Prescription request    Hello, I need a prescription for wegovy. I took my last dose of it yesterday. Please let me know. The dosage I received was 0.25mg.
Requesting WEGOVY  LOV: 2/9/23  RTC: 2 MONTHS  Last Relevant Labs:   Filled:2ML ON 2/9/23  # jack Moore    Future Appointments   Date Time Provider Wendi Ocampo   3/7/2023  3:30 PM Arnaud Downing, BULL [facility] OR[facility]O Wo [id number]   4/6/2023  1:20 PM MJ Valenzuela EMGWEI [id number]     COMPLETED 0.25
WDL

## 2023-03-07 ENCOUNTER — OFFICE VISIT (OUTPATIENT)
Dept: ORTHOPEDICS CLINIC | Facility: CLINIC | Age: 48
End: 2023-03-07
Payer: COMMERCIAL

## 2023-03-07 ENCOUNTER — TELEPHONE (OUTPATIENT)
Dept: INTERNAL MEDICINE CLINIC | Facility: CLINIC | Age: 48
End: 2023-03-07

## 2023-03-07 DIAGNOSIS — D18.00 HEMANGIOMA, UNSPECIFIED SITE: Primary | ICD-10-CM

## 2023-03-07 DIAGNOSIS — R20.2 NUMBNESS AND TINGLING OF FOOT: ICD-10-CM

## 2023-03-07 DIAGNOSIS — R20.0 NUMBNESS AND TINGLING OF FOOT: ICD-10-CM

## 2023-03-07 DIAGNOSIS — Z48.89 AFTERCARE FOLLOWING SURGERY: ICD-10-CM

## 2023-03-07 PROCEDURE — 99213 OFFICE O/P EST LOW 20 MIN: CPT | Performed by: PODIATRIST

## 2023-03-07 NOTE — PROGRESS NOTES
[facility] Progress Note    Subjective:   Patient is here 5 months post right foot excision of hemangioma. Limits too much time on foot still  Will aggravate and more tingling  Overall better and feeling back in toes          Objective:     Exam incision is well-healed and with a light touch she has a little tingling to the toes but she has full sensation interdigitally and to the tip of the toes. There is very mild swelling that persists                  Assessment/Plan:     Diagnoses and all orders for this visit:    Hemangioma, unspecified site    Aftercare following surgery    Numbness and tingling of foot      He is improving and I would expect pretty good resolution in 1 year. Hopefully she will get rid of that tingling. Overall she is doing better  Commended a foot sleeve for compression to use consistently to see if that gives some help  4-5 months follow up from [facility]. Fidelia Kingston DPM  [facility]    Affinio speech recognition software was used to prepare this note. If a word or phrase is confusing, it is likely do to a failure of recognition. Please contact me with any questions or clarifications.

## 2023-03-08 NOTE — TELEPHONE ENCOUNTER
[facility] denied based on pt did not participate in weight loss program.   So I called [facility] to correct that question. I CALLED [facility] ANSWERED ALL  THE QUESTIONS OVER THE PHONE AGAIN.     2-3 WORKING DAYS FOR DECISION     PA- [phone]

## 2023-03-22 ENCOUNTER — PATIENT MESSAGE (OUTPATIENT)
Dept: INTERNAL MEDICINE CLINIC | Facility: CLINIC | Age: 48
End: 2023-03-22

## 2023-03-22 DIAGNOSIS — Z12.31 ENCOUNTER FOR SCREENING MAMMOGRAM FOR MALIGNANT NEOPLASM OF BREAST: Primary | ICD-10-CM

## 2023-03-22 NOTE — TELEPHONE ENCOUNTER
From: Stacey Chaudhary  To: Too Jackson MD  Sent: 3/22/2023 4:33 PM CDT  Subject: Mammogram needed    Hello, I am due for a Mammogram. I am in need to get this done ASAP as my primary insurance will be ending this month. I will also need to schedule my annual physical as well.  Please let me know if you can send the request for Mammogram.

## 2023-03-28 ENCOUNTER — HOSPITAL ENCOUNTER (OUTPATIENT)
Dept: CV DIAGNOSTICS | Age: 48
Discharge: HOME | End: 2023-03-28
Attending: INTERNAL MEDICINE
Payer: COMMERCIAL

## 2023-03-28 ENCOUNTER — LAB ENCOUNTER (OUTPATIENT)
Dept: LAB | Age: 48
End: 2023-03-28
Attending: INTERNAL MEDICINE
Payer: COMMERCIAL

## 2023-03-28 DIAGNOSIS — R79.89 ELEVATED SERUM CREATININE: ICD-10-CM

## 2023-03-28 DIAGNOSIS — R94.31 T WAVE INVERSION IN EKG: ICD-10-CM

## 2023-03-28 DIAGNOSIS — R00.0 TACHYCARDIA: ICD-10-CM

## 2023-03-28 LAB
ANION GAP SERPL CALC-SCNC: 2 MMOL/L (ref 0–18)
BUN BLD-MCNC: 14 MG/DL (ref 7–18)
CALCIUM BLD-MCNC: 9.5 MG/DL (ref 8.5–10.1)
CHLORIDE SERPL-SCNC: 108 MMOL/L (ref 98–112)
CO2 SERPL-SCNC: 28 MMOL/L (ref 21–32)
CREAT BLD-MCNC: 1.05 MG/DL
FASTING STATUS PATIENT QL REPORTED: YES
GFR SERPLBLD BASED ON 1.73 SQ M-ARVRAT: 65 ML/MIN/1.73M2 (ref 60–?)
GLUCOSE BLD-MCNC: 96 MG/DL (ref 70–99)
OSMOLALITY SERPL CALC.SUM OF ELEC: 286 MOSM/KG (ref 275–295)
POTASSIUM SERPL-SCNC: 3.9 MMOL/L (ref 3.5–5.1)
SODIUM SERPL-SCNC: 138 MMOL/L (ref 136–145)
TSI SER-ACNC: 0.8 MIU/ML (ref 0.36–3.74)

## 2023-03-28 PROCEDURE — 93017 CV STRESS TEST TRACING ONLY: CPT | Performed by: INTERNAL MEDICINE

## 2023-03-28 PROCEDURE — 84443 ASSAY THYROID STIM HORMONE: CPT

## 2023-03-28 PROCEDURE — 36415 COLL VENOUS BLD VENIPUNCTURE: CPT

## 2023-03-28 PROCEDURE — 93018 CV STRESS TEST I&R ONLY: CPT | Performed by: INTERNAL MEDICINE

## 2023-03-28 PROCEDURE — 80048 BASIC METABOLIC PNL TOTAL CA: CPT

## 2023-03-30 ENCOUNTER — PATIENT MESSAGE (OUTPATIENT)
Dept: INTERNAL MEDICINE CLINIC | Facility: CLINIC | Age: 48
End: 2023-03-30

## 2023-03-30 DIAGNOSIS — I10 ESSENTIAL HYPERTENSION, BENIGN: Primary | ICD-10-CM

## 2023-04-04 ENCOUNTER — TELEPHONE (OUTPATIENT)
Dept: INTERNAL MEDICINE CLINIC | Facility: CLINIC | Age: 48
End: 2023-04-04

## 2023-04-04 NOTE — TELEPHONE ENCOUNTER
Outgoing Fax  We received a cardiac clearance request signed by Dr. Palma Monday 03/30/23   STATUS CP

## 2023-04-05 RX ORDER — AMLODIPINE BESYLATE 5 MG/1
10 TABLET ORAL DAILY
Qty: 90 TABLET | Refills: 3 | COMMUNITY
Start: 2023-04-05

## 2023-04-05 NOTE — TELEPHONE ENCOUNTER
Relayed DV recommendations and Rx change. Pt voiced understanding. Connected to  to schedule f/u OV within the next week with DV.

## 2023-04-05 NOTE — TELEPHONE ENCOUNTER
She's already on losartan, chlorthalidone amlodipine. Should increase amlodipine to 10mg daily and follow-up in clinic.    If acute symptoms then urgent care

## 2023-04-05 NOTE — TELEPHONE ENCOUNTER
Incoming Fax   We received the same fax as yesterday called and confirmed resent current Cardiac Clearance Request.    Outgoing Fax  I faxed current form [facility] SOL

## 2023-04-05 NOTE — TELEPHONE ENCOUNTER
Pt reporting elevated BP readings recently. Per pt BP was 145/89 at home yesterday & at OV visit with Dr. Kerns Repress was 145/78. States she takes readings before and after her daily BP meds and they stay in the 140's/80's. The lowest she has seen it is 135/78. At times pt feels headaches, overall feels sick to stomach, blurry vision/eye pain. Unsure if migraine HA or related to BP. Denies any CP or SOB. Any changes to Rx? Agreeable to scheduling OV for BP check/follow up. Please advise-TY!

## 2023-04-06 ENCOUNTER — OFFICE VISIT (OUTPATIENT)
Dept: INTERNAL MEDICINE CLINIC | Facility: CLINIC | Age: 48
End: 2023-04-06
Payer: COMMERCIAL

## 2023-04-06 VITALS
SYSTOLIC BLOOD PRESSURE: 122 MMHG | OXYGEN SATURATION: 97 % | HEIGHT: 64 IN | WEIGHT: 216 LBS | BODY MASS INDEX: 36.88 KG/M2 | DIASTOLIC BLOOD PRESSURE: 84 MMHG | HEART RATE: 94 BPM | RESPIRATION RATE: 16 BRPM

## 2023-04-06 DIAGNOSIS — K59.00 CONSTIPATION, UNSPECIFIED CONSTIPATION TYPE: ICD-10-CM

## 2023-04-06 DIAGNOSIS — R73.03 PREDIABETES: ICD-10-CM

## 2023-04-06 DIAGNOSIS — Z51.81 THERAPEUTIC DRUG MONITORING: Primary | ICD-10-CM

## 2023-04-06 DIAGNOSIS — I10 ESSENTIAL HYPERTENSION, BENIGN: ICD-10-CM

## 2023-04-06 DIAGNOSIS — E66.9 OBESITY (BMI 30-39.9): ICD-10-CM

## 2023-04-06 PROCEDURE — 3074F SYST BP LT 130 MM HG: CPT | Performed by: NURSE PRACTITIONER

## 2023-04-06 PROCEDURE — 99214 OFFICE O/P EST MOD 30 MIN: CPT | Performed by: NURSE PRACTITIONER

## 2023-04-06 PROCEDURE — 3008F BODY MASS INDEX DOCD: CPT | Performed by: NURSE PRACTITIONER

## 2023-04-06 PROCEDURE — 3079F DIAST BP 80-89 MM HG: CPT | Performed by: NURSE PRACTITIONER

## 2023-04-06 NOTE — PATIENT INSTRUCTIONS
Next steps  1. Fill your prescribed medication and take as discussed and prescribed: wegovy 1mg weekly x 4 weeks and then increase to 1.7mg weekly   2. Schedule a personal nutrition consultation with one of our registered dieticians     Please try to work on the following dietary changes:  Daily protein recommendation to start:  grams  Daily carbohydrate: <115g  Daily calories: 1,400  1. Drink water with meals and throughout the day, cut down on soda and/or juice if consumed. Consider flavored water options like Bubbly, Spindrift, Hint and Naren. 2.  Eat breakfast daily and focus on having protein with each meal, examples include: greek yogurt, cottage cheese, hard boiled egg, whole grain toast with peanut butter. 3.  Reduce refined carbohydrates and sugars which includes items such as sweets, as well as rice, pasta, and bread and make sure to choose whole grain options when having them with just 1 serving per meal about the size of your inner palm. 4.  Consume non starchy veggies daily working towards making them a good 50% of your daily food intake. Add them to lunch and dinner consistently. 5.  Start a daily probiotic: VSL#3 is recommended, (order on line at www.vsl3. com). Take 1 capsule daily with water for 30 days, then reduce to 1 every other day (this will reduce the cost). Capsules can be left out for 2 weeks, but then must be refrigerated. Please download stella My Fitness Jules [place]! Or Net Diary to monitor daily dietary intake and you will be able to see if you are eating the right amount of calories or too much or too little which would hinder weight loss. Additionally this will help to see your daily carbohydrate and protein intake. When you set the stella up choose 1-2 lbs/week as a goal.  Keeping a paper food journal is an option as well to remain accountable for your choices- this is the start to mindful eating! A low calorie diet has been consistently shown to support weight loss. Continue or start exercising to help establish a routine. If not already exercising begin with 1 day and progress as able with long-term goal of 30 minutes 5 days a week at a minimum. Meditation daily can help manage and control stress. Chronic stress can make weight loss difficult. Exercising is one way to help with stress, but meditation using the CALM Leif or another comparable alternative can be done in your home or place of work with little time commitment. This Leif can also help work on behavior change and improve sleep. Check out the segment under Calm Masterclass and listen to The 4 Pillars of Health. A great way to begin learning about the foundation of lifestyle with practical tips to use in your every day. Check out www.yourweightmatters. org blog for continued daily support and education along this weight loss journey! Patient Resources:     Personal Training/Fitness Classes/Health Coaching     Tra [place] and Hernandez Indigo @ [email or web address]/ Full fitness center with group fitness and personal training. Discount available as client of [facility]. Health Coaching and Personal Training with Matthieu Miranda at our [facility]- individual weekly coaching with option to add personal training and small group fitness classes targeted at weight loss- [phone] and/or email @ [email or web address] [email or web address]  360FIT [place] [email or web address] Group Fitness [phone] and/or email Jong Delgado at [email or web address]  [address] [facility] with multiple locations: Aetna (www.MobileIgniter. Salient Pharmaceuticals), Eat The Frog Fitness (www.Neo PLM. Salient Pharmaceuticals), Fit Body Bootcamp (www.Tamra-Tacoma Capital Partnersbodybootcamp.Salient Pharmaceuticals), Roovyn Fitness (www."Tunnel X, Inc.". Salient Pharmaceuticals), The Exercise  (www.exercisecoach.Salient Pharmaceuticals)     Online Fitness  Fitness  on Whole Foods in 10 DVD series- www. xqlhf11RYJ. Salient Pharmaceuticals  Sit and Be Fit - Chair exercise series Www.sitandbefit. org  Hip Hop Fit with Alonzo Thrasher at www.hiphopfit. net     Apps for on the scenios 7 Minute Workout (orange box with white 7) - free on the go HIIT training leif  Peloton Leif @ wwwAJ Tech     Nutrition Trackers and Tools  LoseIT! And My Fitness Pal apps and on line for tracking nutrition  NOOM - virtual health coaching  FitFoundation (healthy meals on the go) in [facility] @ www. unkpetjoertqu4w. Maurilio Thompson MD @ wwwParatek PharmaceuticalstromdNova Southeastern University and Akshat Mae (keto and low carb plans recommended) @ www. [email or web address].SWQ, Metabolic Meals @ wwwFlybitsals. com - individual prepared meals to go  i-Nalysis, LSEO, International Business Machines, Every Plate, jslyhl- on line meal delivery programs for preparation at home  [place] [employer] in [place] for homemade meals to go @ wwwNephroGenex  Diet Doctor @ www. dietdoctor. com - low carb swaps  Yummly - meal prep and planning leif (www.yummly. com)     Stress Management/Behavior/Mindful Eating  CALM meditation leif (www.Aavya Health)  Headspace  Am I Hungry? Mindful eating virtual  leif  Www.yourweightmatters. org - Obesity Action Coalition sponsored Blog posts daily  Motivation leif (black box with white \")- daily supportive messages sent to your phone     Books/Video Education/Podcasts  Mindless Eating by Silvia Woodson  Why We Get Sick by Ariel Alonso (a book about insulin resistance)  Atomic Habits by Rhett Truong (a book about taking small steps to promote greater behavior change)   Can't Hurt Me by Galina Salinas (a book exploring the power of discipline in achieving your goals)  The End of Dieting: How to Live for Life by Dr. Amber Marin M.D. or listen to The [address] [facility] [address] Episode 61: Understanding \"Nutritarian\" Eating w/Dr. Amber Marin  Your Body in Balance:  The World Fuel Services Corporation of Food, Hormones, and Health by Dr. Lubna Sterling  The Menopause Diet Plan by Lin Cabrera and [facility] AT [facility]  The Complete Guide to fasting by Dr. Domi Keating, [address] [facility] Fat by Fauzia Sheriff, Ph.D, R.D. Weight Loss Surgery Will Not Treat Food Addiction by Hiral Mcgovern Ph.D  The [address] [place] [address] on plant based nutrition  Fed Up - documentary about obesity (Free on [place])  The Truth About Sugar - documentary on sugar (Free on Utube, https://youtu. be/3O1lifbNJ6l)  The Dr. Serna Gabriela by Dr. Manny Brunson MD  Fitlosophy Fitspiration - journal to better health (found at [employer] in fitness aisle)  What Happened to You?- a look at the impact trauma has on behavior written by Sandy Burns and Dr. Fenton Ship Again by Tushar Das - discovering your true self after trauma  Ioana Rowell talk on One Source Networks, The Call to Change Collective  Podcasts: The Exam Room by the Physician's Committee, Nutrition Facts by Dr. Mikie Clayton    We are here to support you with weight loss, but please remember that you still need your primary care provider for your routine health maintenance.

## 2023-04-11 ENCOUNTER — TELEPHONE (OUTPATIENT)
Dept: INTERNAL MEDICINE CLINIC | Facility: CLINIC | Age: 48
End: 2023-04-11

## 2023-04-11 ENCOUNTER — OFFICE VISIT (OUTPATIENT)
Dept: INTERNAL MEDICINE CLINIC | Facility: CLINIC | Age: 48
End: 2023-04-11
Payer: COMMERCIAL

## 2023-04-11 VITALS
SYSTOLIC BLOOD PRESSURE: 120 MMHG | DIASTOLIC BLOOD PRESSURE: 60 MMHG | RESPIRATION RATE: 16 BRPM | HEART RATE: 89 BPM | TEMPERATURE: 98 F | BODY MASS INDEX: 37 KG/M2 | WEIGHT: 216 LBS | OXYGEN SATURATION: 99 %

## 2023-04-11 DIAGNOSIS — I10 ESSENTIAL HYPERTENSION, BENIGN: Primary | ICD-10-CM

## 2023-04-11 DIAGNOSIS — Z91.09 ENVIRONMENTAL ALLERGIES: ICD-10-CM

## 2023-04-11 PROCEDURE — 3078F DIAST BP <80 MM HG: CPT | Performed by: INTERNAL MEDICINE

## 2023-04-11 PROCEDURE — 99213 OFFICE O/P EST LOW 20 MIN: CPT | Performed by: INTERNAL MEDICINE

## 2023-04-11 PROCEDURE — 3074F SYST BP LT 130 MM HG: CPT | Performed by: INTERNAL MEDICINE

## 2023-04-11 RX ORDER — CETIRIZINE HYDROCHLORIDE 10 MG/1
10 TABLET ORAL DAILY
Qty: 90 TABLET | Refills: 1 | Status: SHIPPED | OUTPATIENT
Start: 2023-04-11

## 2023-04-11 RX ORDER — OLOPATADINE HYDROCHLORIDE 2 MG/ML
1 SOLUTION/ DROPS OPHTHALMIC DAILY
Qty: 2.5 ML | Refills: 0 | Status: SHIPPED | OUTPATIENT
Start: 2023-04-11

## 2023-04-11 NOTE — TELEPHONE ENCOUNTER
No refill needed - first RX was not released and now sent 4/11/23 pa applied for .   Deny this next [place]

## 2023-04-13 RX ORDER — SEMAGLUTIDE 0.5 MG/.5ML
INJECTION, SOLUTION SUBCUTANEOUS
Refills: 0 | OUTPATIENT
Start: 2023-04-13

## 2023-04-18 ENCOUNTER — HOSPITAL ENCOUNTER (OUTPATIENT)
Dept: MAMMOGRAPHY | Age: 48
Discharge: HOME | End: 2023-04-18
Attending: INTERNAL MEDICINE
Payer: COMMERCIAL

## 2023-04-18 DIAGNOSIS — Z12.31 ENCOUNTER FOR SCREENING MAMMOGRAM FOR MALIGNANT NEOPLASM OF BREAST: ICD-10-CM

## 2023-04-18 PROCEDURE — 77067 SCR MAMMO BI INCL CAD: CPT | Performed by: INTERNAL MEDICINE

## 2023-04-18 PROCEDURE — 77063 BREAST TOMOSYNTHESIS BI: CPT | Performed by: INTERNAL MEDICINE

## 2023-05-04 ENCOUNTER — PATIENT MESSAGE (OUTPATIENT)
Dept: INTERNAL MEDICINE CLINIC | Facility: CLINIC | Age: 48
End: 2023-05-04

## 2023-05-05 NOTE — TELEPHONE ENCOUNTER
From: Domonique Hare  To: MJ Singleton  Sent: 5/4/2023 12:55 PM CDT  Subject: [id number] request for increase     Good Afternoon, I have taken my last shot for [facility]. I need a new prescription for the increased dosage. Please request so that I can take medication on time.    Thank you,  Manual Picking

## 2023-05-12 DIAGNOSIS — I10 ESSENTIAL HYPERTENSION, BENIGN: ICD-10-CM

## 2023-05-13 RX ORDER — FLUTICASONE PROPIONATE 50 MCG
2 SPRAY, SUSPENSION (ML) NASAL DAILY
Qty: 16 ML | Refills: 0 | Status: SHIPPED | OUTPATIENT
Start: 2023-05-13

## 2023-05-13 RX ORDER — LOSARTAN POTASSIUM 100 MG/1
100 TABLET ORAL DAILY
Qty: 90 TABLET | Refills: 3 | OUTPATIENT
Start: 2023-05-13

## 2023-05-13 RX ORDER — AMLODIPINE BESYLATE 5 MG/1
10 TABLET ORAL DAILY
Qty: 90 TABLET | Refills: 3 | OUTPATIENT
Start: 2023-05-13

## 2023-05-13 RX ORDER — MONTELUKAST SODIUM 10 MG/1
10 TABLET ORAL NIGHTLY
Qty: 90 TABLET | Refills: 3 | OUTPATIENT
Start: 2023-05-13

## 2023-05-25 ENCOUNTER — OFFICE VISIT (OUTPATIENT)
Dept: INTERNAL MEDICINE CLINIC | Facility: CLINIC | Age: 48
End: 2023-05-25
Payer: COMMERCIAL

## 2023-05-25 VITALS
HEART RATE: 90 BPM | BODY MASS INDEX: 36.88 KG/M2 | HEIGHT: 64 IN | OXYGEN SATURATION: 95 % | SYSTOLIC BLOOD PRESSURE: 128 MMHG | DIASTOLIC BLOOD PRESSURE: 80 MMHG | WEIGHT: 216 LBS | RESPIRATION RATE: 18 BRPM

## 2023-05-25 DIAGNOSIS — E66.9 OBESITY (BMI 30-39.9): ICD-10-CM

## 2023-05-25 DIAGNOSIS — R73.03 PREDIABETES: ICD-10-CM

## 2023-05-25 DIAGNOSIS — I10 ESSENTIAL HYPERTENSION, BENIGN: ICD-10-CM

## 2023-05-25 DIAGNOSIS — K59.00 CONSTIPATION, UNSPECIFIED CONSTIPATION TYPE: ICD-10-CM

## 2023-05-25 DIAGNOSIS — Z51.81 THERAPEUTIC DRUG MONITORING: Primary | ICD-10-CM

## 2023-05-25 PROCEDURE — 3074F SYST BP LT 130 MM HG: CPT | Performed by: NURSE PRACTITIONER

## 2023-05-25 PROCEDURE — 99214 OFFICE O/P EST MOD 30 MIN: CPT | Performed by: NURSE PRACTITIONER

## 2023-05-25 PROCEDURE — 3008F BODY MASS INDEX DOCD: CPT | Performed by: NURSE PRACTITIONER

## 2023-05-25 PROCEDURE — 3079F DIAST BP 80-89 MM HG: CPT | Performed by: NURSE PRACTITIONER

## 2023-05-25 RX ORDER — FLUTICASONE PROPIONATE AND SALMETEROL 50; 250 UG/1; UG/1
POWDER RESPIRATORY (INHALATION) 2 TIMES DAILY
COMMUNITY
Start: 2023-05-12

## 2023-05-25 RX ORDER — SEMAGLUTIDE 2.4 MG/.75ML
2.4 INJECTION, SOLUTION SUBCUTANEOUS WEEKLY
Qty: 9 ML | Refills: 0 | Status: SHIPPED | OUTPATIENT
Start: 2023-05-25

## 2023-05-25 NOTE — PATIENT INSTRUCTIONS
Next steps:  1. Fill your prescribed medication and take as discussed and prescribed: wegovy 2.4mg weekly   2. Schedule a personal nutrition consultation with one of our registered dieticians       1. Drink water with meals and throughout the day, cut down on soda and/or juice if consumed. Consider flavored water options like Bubbly, Spindrift, Hint and Naren. 2.  Eat breakfast daily and focus on having protein with each meal, examples include: greek yogurt, cottage cheese, hard boiled egg, whole grain toast with peanut butter. 3.  Reduce refined carbohydrates and sugars which includes items such as sweets, as well as rice, pasta, and bread and make sure to choose whole grain options when having them with just 1 serving per meal about the size of your inner palm. 4.  Consume non starchy veggies daily working towards making them a good 50% of your daily food intake. Add them to lunch and dinner consistently. 5.  Start a daily probiotic: VSL#3 is recommended, (order on line at www.vsl3. com). Take 1 capsule daily with water for 30 days, then reduce to 1 every other day (this will reduce the cost). Capsules can be left out for 2 weeks, but then must be refrigerated. Please download stella My Fitness Gabriele Kolb! Or Net Diary to monitor daily dietary intake and you will be able to see if you are eating the right amount of calories or too much or too little which would hinder weight loss. Additionally this will help to see your daily carbohydrate and protein intake. When you set the stella up choose 1-2 lbs/week as a goal.  Keeping a paper food journal is an option as well to remain accountable for your choices- this is the start to mindful eating! A low calorie diet has been consistently shown to support weight loss. Continue or start exercising to help establish a routine. If not already exercising begin with 1 day and progress as able with long-term goal of 30 minutes 5 days a week at a minimum. Meditation daily can help manage and control stress. Chronic stress can make weight loss difficult. Exercising is one way to help with stress, but meditation using the CALM Leif or another comparable alternative can be done in your home or place of work with little time commitment. This Leif can also help work on behavior change and improve sleep. Check out the segment under Calm Masterclass and listen to The 4 Pillars of Health. A great way to begin learning about the foundation of lifestyle with practical tips to use in your every day. Check out www.yourweightmatters. org blog for continued daily support and education along this weight loss journey! Patient Resources:     Personal Training/Fitness Classes/Health Coaching     [facility] and David Sood @ [email or web address]/ Full fitness center with group fitness and personal training. Discount available as client of [facility]. Health Coaching and Personal Training with Ivonne Peters at our [facility]- individual weekly coaching with option to add personal training and small group fitness classes targeted at weight loss- [phone] and/or email @ [email or web address]  360FIT [place] [email or web address] Group Fitness [phone] and/or email John Walker at [email or web address]. MaxPoint Interactive  [address] [facility] with multiple locations: Aetna (www.Quackenworth), Eat The Zitra.com Fitness (www.Eximia. MaxPoint Interactive), Fit Body Bootcamp (www.Active Voice Corporationp.MaxPoint Interactive), TinderBox (www.LMN-1), The Exercise  (www.exercisecoach.MaxPoint Interactive)     Online Fitness  Fitness  on Whole Foods in 10 DVD series- www. swaow18EOB. MaxPoint Interactive  Sit and Be Fit - Chair exercise series Www.sitandbefit. org  Hip Hop Fit with Alonzo Thrasher at www.hiphopfit. net     Apps for on the Voradius 7 Minute Workout (orange box with white 7) - free on the go HIIT training leif  Peloton Leif @ www. Summize     Nutrition Trackers and Tools  LoseIT! And My Fitness Pal apps and on line for tracking nutrition  NOOM - virtual health coaching  FitFoundation (healthy meals on the go) in [facility]-SCI @ www. apyvnibzoiawu8q. Mario Reynolds MD @ www.CrossCored.com and Joey Sanchez (keto and low carb plans recommended) @ www. EADGZZ55.JPD, Metabolic Meals @ www. MyMetabolicMeals. com - individual prepared meals to go  fluIT Biosystems, Bambeco, International Business Machines, Every Plate, AbbeyPost- on line meal delivery programs for preparation at home  [place] CrossFiber in [place] for homemade meals to go @ wwwQuid. TweetMySong.com  Diet Doctor @ www. dietdoctor. TweetMySong.com - low carb swaps  Yummly - meal prep and planning leif (www.yummly. com)     Stress Management/Behavior/Mindful Eating  CALM meditation leif (www.Optrace)  Headspace  Am I Hungry? Mindful eating virtual  leif  Www.yourweightmatters. org - Obesity Action Coalition sponsored Blog posts daily  Motivation leif (black box with white \")- daily supportive messages sent to your phone     Books/Video Education/Podcasts  Mindless Eating by Annalise Olvera  Why We Get Sick by Handy Salinas (a book about insulin resistance)  Atomic Habits by Sri Lentz (a book about taking small steps to promote greater behavior change)   Can't Hurt Me by Kaylan Salcedo (a book exploring the power of discipline in achieving your goals)  The End of Dieting: How to Live for Life by Dr. Kassandra Kennedy M.D. or listen to The [address] [facility] Episode 61: Understanding \"Nutritarian\" Eating w/Dr. Kassandra Kennedy  Your Body in Balance: The World Fuel Services Corporation of Food, Hormones, and Health by Dr. Awilda Lee  The Menopause Diet Plan by Cal Whitney and [facility] AT [facility]  The Complete Guide to fasting by Dr. Concepcion Rice, [address] [facility] by Nicolette Fuentes, Ph.D, R.D.   Weight Loss Surgery Will Not Treat Food Addiction by Sal Guerrero Ph.D  The Game Changers- AT Internet Documentary on plant based nutrition  Fed Up - documentary about obesity (Free on Utube)  The Truth About Sugar - documentary on sugar (Free on Utube, https://youtu. be/8T1jzelBX5h)  The Dr. Matias Scruggs by Dr. Tom Fernandez MD  Fitlosophy Fitspiration - journal to better health (found at [employer] in fitness aisle)  What Happened to You?- a look at the impact trauma has on behavior written by James Sharp and Dr. Maynor Mcpherson Again by Wayne Mckeon - discovering your true self after trauma  Jose Roberto Saba talk on Space Race, The Call to [place] Free Classifieds  Podcasts: The Exam Room by the Physician's Committee, Nutrition Facts by Dr. Junie Romero    We are here to support you with weight loss, but please remember that you still need your primary care provider for your routine health maintenance.

## 2023-08-01 ENCOUNTER — OFFICE VISIT (OUTPATIENT)
Dept: INTERNAL MEDICINE CLINIC | Facility: CLINIC | Age: 48
End: 2023-08-01
Payer: COMMERCIAL

## 2023-08-01 VITALS
WEIGHT: 207 LBS | RESPIRATION RATE: 17 BRPM | SYSTOLIC BLOOD PRESSURE: 120 MMHG | HEART RATE: 96 BPM | OXYGEN SATURATION: 99 % | TEMPERATURE: 98 F | BODY MASS INDEX: 34.91 KG/M2 | DIASTOLIC BLOOD PRESSURE: 60 MMHG | HEIGHT: 64.4 IN

## 2023-08-01 DIAGNOSIS — J45.20 MILD INTERMITTENT ASTHMA WITHOUT COMPLICATION: ICD-10-CM

## 2023-08-01 DIAGNOSIS — R52 PAIN: ICD-10-CM

## 2023-08-01 DIAGNOSIS — I10 ESSENTIAL HYPERTENSION, BENIGN: ICD-10-CM

## 2023-08-01 DIAGNOSIS — M25.552 PAIN OF LEFT HIP: ICD-10-CM

## 2023-08-01 DIAGNOSIS — Z00.00 ANNUAL PHYSICAL EXAM: Primary | ICD-10-CM

## 2023-08-01 PROBLEM — D18.00 HEMANGIOMA: Status: RESOLVED | Noted: 2022-08-19 | Resolved: 2023-08-01

## 2023-08-01 PROCEDURE — 99214 OFFICE O/P EST MOD 30 MIN: CPT | Performed by: INTERNAL MEDICINE

## 2023-08-01 PROCEDURE — 3008F BODY MASS INDEX DOCD: CPT | Performed by: INTERNAL MEDICINE

## 2023-08-01 PROCEDURE — 90471 IMMUNIZATION ADMIN: CPT | Performed by: INTERNAL MEDICINE

## 2023-08-01 PROCEDURE — 3074F SYST BP LT 130 MM HG: CPT | Performed by: INTERNAL MEDICINE

## 2023-08-01 PROCEDURE — 90715 TDAP VACCINE 7 YRS/> IM: CPT | Performed by: INTERNAL MEDICINE

## 2023-08-01 PROCEDURE — 3078F DIAST BP <80 MM HG: CPT | Performed by: INTERNAL MEDICINE

## 2023-08-01 PROCEDURE — 99396 PREV VISIT EST AGE 40-64: CPT | Performed by: INTERNAL MEDICINE

## 2023-08-03 DIAGNOSIS — Z91.09 ENVIRONMENTAL ALLERGIES: ICD-10-CM

## 2023-08-04 RX ORDER — OLOPATADINE HYDROCHLORIDE 2 MG/ML
1 SOLUTION/ DROPS OPHTHALMIC DAILY
Qty: 2.5 ML | Refills: 5 | Status: SHIPPED | OUTPATIENT
Start: 2023-08-04

## 2023-08-04 RX ORDER — MONTELUKAST SODIUM 10 MG/1
10 TABLET ORAL NIGHTLY
Qty: 90 TABLET | Refills: 3 | Status: SHIPPED | OUTPATIENT
Start: 2023-08-04

## 2023-08-04 RX ORDER — FLUTICASONE PROPIONATE 50 MCG
2 SPRAY, SUSPENSION (ML) NASAL DAILY
Qty: 48 ML | Refills: 1 | Status: SHIPPED | OUTPATIENT
Start: 2023-08-04

## 2023-08-04 RX ORDER — CETIRIZINE HYDROCHLORIDE 10 MG/1
10 TABLET ORAL DAILY
Qty: 90 TABLET | Refills: 1 | Status: SHIPPED | OUTPATIENT
Start: 2023-08-04

## 2023-08-04 NOTE — TELEPHONE ENCOUNTER
Cetirizine 10 mg  Filled 4-11-23  Qty 90  1 refill  No upcoming appt. LOV 8-1-23 DV  RTC none    Fluticasone  Filled 6-14-23  Qty 16 mL  3 refills  No upcoming appt. LOV 8-1-23 DV  RTC none    Olopatinadine ophthalmic soln. Filled 4-11-23  Qty 2.5 mL  0 refills  No upcoming appt.   LOV 8-1-23 DV  RTC none

## 2023-08-07 ENCOUNTER — HOSPITAL ENCOUNTER (OUTPATIENT)
Dept: GENERAL RADIOLOGY | Age: 48
Discharge: HOME | End: 2023-08-07
Attending: INTERNAL MEDICINE
Payer: COMMERCIAL

## 2023-08-07 ENCOUNTER — LAB ENCOUNTER (OUTPATIENT)
Dept: LAB | Age: 48
End: 2023-08-07
Attending: INTERNAL MEDICINE
Payer: COMMERCIAL

## 2023-08-07 DIAGNOSIS — M25.552 PAIN OF LEFT HIP: ICD-10-CM

## 2023-08-07 DIAGNOSIS — R52 PAIN: ICD-10-CM

## 2023-08-07 DIAGNOSIS — Z00.00 ANNUAL PHYSICAL EXAM: ICD-10-CM

## 2023-08-07 DIAGNOSIS — I10 ESSENTIAL HYPERTENSION, BENIGN: ICD-10-CM

## 2023-08-07 LAB
ALBUMIN SERPL-MCNC: 3.9 G/DL (ref 3.4–5)
ALBUMIN/GLOB SERPL: 1 {RATIO} (ref 1–2)
ALP LIVER SERPL-CCNC: 67 U/L
ALT SERPL-CCNC: 21 U/L
ANION GAP SERPL CALC-SCNC: 7 MMOL/L (ref 0–18)
AST SERPL-CCNC: 22 U/L (ref 15–37)
BASOPHILS # BLD AUTO: 0.06 X10(3) UL (ref 0–0.2)
BASOPHILS NFR BLD AUTO: 1 %
BILIRUB SERPL-MCNC: 0.3 MG/DL (ref 0.1–2)
BUN BLD-MCNC: 11 MG/DL (ref 7–18)
CALCIUM BLD-MCNC: 9.4 MG/DL (ref 8.5–10.1)
CHLORIDE SERPL-SCNC: 108 MMOL/L (ref 98–112)
CHOLEST SERPL-MCNC: 184 MG/DL (ref ?–200)
CO2 SERPL-SCNC: 25 MMOL/L (ref 21–32)
CREAT BLD-MCNC: 1.04 MG/DL
EGFRCR SERPLBLD CKD-EPI 2021: 66 ML/MIN/1.73M2 (ref 60–?)
EOSINOPHIL # BLD AUTO: 0.23 X10(3) UL (ref 0–0.7)
EOSINOPHIL NFR BLD AUTO: 3.9 %
ERYTHROCYTE [DISTWIDTH] IN BLOOD BY AUTOMATED COUNT: 14.2 %
FASTING PATIENT LIPID ANSWER: YES
FASTING STATUS PATIENT QL REPORTED: YES
GLOBULIN PLAS-MCNC: 3.8 G/DL (ref 2.8–4.4)
GLUCOSE BLD-MCNC: 88 MG/DL (ref 70–99)
HCT VFR BLD AUTO: 38.8 %
HDLC SERPL-MCNC: 52 MG/DL (ref 40–59)
HGB BLD-MCNC: 12.6 G/DL
IMM GRANULOCYTES # BLD AUTO: 0.01 X10(3) UL (ref 0–1)
IMM GRANULOCYTES NFR BLD: 0.2 %
LDLC SERPL CALC-MCNC: 118 MG/DL (ref ?–100)
LYMPHOCYTES # BLD AUTO: 2.57 X10(3) UL (ref 1–4)
LYMPHOCYTES NFR BLD AUTO: 43 %
MCH RBC QN AUTO: 29.9 PG (ref 26–34)
MCHC RBC AUTO-ENTMCNC: 32.5 G/DL (ref 31–37)
MCV RBC AUTO: 91.9 FL
MONOCYTES # BLD AUTO: 0.31 X10(3) UL (ref 0.1–1)
MONOCYTES NFR BLD AUTO: 5.2 %
NEUTROPHILS # BLD AUTO: 2.79 X10 (3) UL (ref 1.5–7.7)
NEUTROPHILS # BLD AUTO: 2.79 X10(3) UL (ref 1.5–7.7)
NEUTROPHILS NFR BLD AUTO: 46.7 %
NONHDLC SERPL-MCNC: 132 MG/DL (ref ?–130)
OSMOLALITY SERPL CALC.SUM OF ELEC: 289 MOSM/KG (ref 275–295)
PLATELET # BLD AUTO: 253 10(3)UL (ref 150–450)
POTASSIUM SERPL-SCNC: 3.9 MMOL/L (ref 3.5–5.1)
PROT SERPL-MCNC: 7.7 G/DL (ref 6.4–8.2)
RBC # BLD AUTO: 4.22 X10(6)UL
SODIUM SERPL-SCNC: 140 MMOL/L (ref 136–145)
TRIGL SERPL-MCNC: 76 MG/DL (ref 30–149)
TSI SER-ACNC: 1.28 MIU/ML (ref 0.36–3.74)
VIT D+METAB SERPL-MCNC: 22.3 NG/ML (ref 30–100)
VLDLC SERPL CALC-MCNC: 13 MG/DL (ref 0–30)
WBC # BLD AUTO: 6 X10(3) UL (ref 4–11)

## 2023-08-07 PROCEDURE — 73502 X-RAY EXAM HIP UNI 2-3 VIEWS: CPT | Performed by: INTERNAL MEDICINE

## 2023-08-07 PROCEDURE — 82306 VITAMIN D 25 HYDROXY: CPT | Performed by: INTERNAL MEDICINE

## 2023-08-07 PROCEDURE — 73610 X-RAY EXAM OF ANKLE: CPT | Performed by: INTERNAL MEDICINE

## 2023-08-07 PROCEDURE — 83036 HEMOGLOBIN GLYCOSYLATED A1C: CPT | Performed by: INTERNAL MEDICINE

## 2023-08-07 PROCEDURE — 80050 GENERAL HEALTH PANEL: CPT | Performed by: INTERNAL MEDICINE

## 2023-08-07 PROCEDURE — 80061 LIPID PANEL: CPT | Performed by: INTERNAL MEDICINE

## 2023-08-08 LAB
EST. AVERAGE GLUCOSE BLD GHB EST-MCNC: 123 MG/DL (ref 68–126)
HBA1C MFR BLD: 5.9 % (ref ?–5.7)

## 2023-08-16 ENCOUNTER — OFFICE VISIT (OUTPATIENT)
Dept: INTERNAL MEDICINE CLINIC | Facility: CLINIC | Age: 48
End: 2023-08-16
Payer: COMMERCIAL

## 2023-08-16 VITALS
DIASTOLIC BLOOD PRESSURE: 80 MMHG | WEIGHT: 204 LBS | HEART RATE: 96 BPM | SYSTOLIC BLOOD PRESSURE: 130 MMHG | BODY MASS INDEX: 34.83 KG/M2 | HEIGHT: 64 IN | RESPIRATION RATE: 18 BRPM

## 2023-08-16 DIAGNOSIS — R73.03 PREDIABETES: ICD-10-CM

## 2023-08-16 DIAGNOSIS — E66.9 OBESITY (BMI 30-39.9): ICD-10-CM

## 2023-08-16 DIAGNOSIS — I10 ESSENTIAL HYPERTENSION, BENIGN: ICD-10-CM

## 2023-08-16 DIAGNOSIS — K59.00 CONSTIPATION, UNSPECIFIED CONSTIPATION TYPE: ICD-10-CM

## 2023-08-16 DIAGNOSIS — Z51.81 THERAPEUTIC DRUG MONITORING: Primary | ICD-10-CM

## 2023-08-16 PROCEDURE — 3008F BODY MASS INDEX DOCD: CPT | Performed by: NURSE PRACTITIONER

## 2023-08-16 PROCEDURE — 99213 OFFICE O/P EST LOW 20 MIN: CPT | Performed by: NURSE PRACTITIONER

## 2023-08-16 PROCEDURE — 3079F DIAST BP 80-89 MM HG: CPT | Performed by: NURSE PRACTITIONER

## 2023-08-16 PROCEDURE — 3075F SYST BP GE 130 - 139MM HG: CPT | Performed by: NURSE PRACTITIONER

## 2023-08-16 RX ORDER — SEMAGLUTIDE 2.4 MG/.75ML
2.4 INJECTION, SOLUTION SUBCUTANEOUS WEEKLY
Qty: 9 ML | Refills: 0 | Status: SHIPPED | OUTPATIENT
Start: 2023-08-16

## 2023-08-16 NOTE — PATIENT INSTRUCTIONS
Next steps:  1. Fill your prescribed medication and take as discussed and prescribed: wegovy 2.4mg weekly   2. Schedule a personal nutrition consultation with one of our registered dieticians       1. Drink water with meals and throughout the day, cut down on soda and/or juice if consumed. Consider flavored water options like Bubbly, Spindrift, Hint and Naren. 2.  Eat breakfast daily and focus on having protein with each meal, examples include: greek yogurt, cottage cheese, hard boiled egg, whole grain toast with peanut butter. 3.  Reduce refined carbohydrates and sugars which includes items such as sweets, as well as rice, pasta, and bread and make sure to choose whole grain options when having them with just 1 serving per meal about the size of your inner palm. 4.  Consume non starchy veggies daily working towards making them a good 50% of your daily food intake. Add them to lunch and dinner consistently. 5.  Start a daily probiotic: VSL#3 is recommended, (order on line at www.vsl3. com). Take 1 capsule daily with water for 30 days, then reduce to 1 every other day (this will reduce the cost). Capsules can be left out for 2 weeks, but then must be refrigerated. Please download stella My Fitness Orvel Dubin! Or Net Diary to monitor daily dietary intake and you will be able to see if you are eating the right amount of calories or too much or too little which would hinder weight loss. Additionally this will help to see your daily carbohydrate and protein intake. When you set the stella up choose 1-2 lbs/week as a goal.  Keeping a paper food journal is an option as well to remain accountable for your choices- this is the start to mindful eating! A low calorie diet has been consistently shown to support weight loss. Continue or start exercising to help establish a routine. If not already exercising begin with 1 day and progress as able with long-term goal of 30 minutes 5 days a week at a minimum. Meditation daily can help manage and control stress. Chronic stress can make weight loss difficult. Exercising is one way to help with stress, but meditation using the CALM Leif or another comparable alternative can be done in your home or place of work with little time commitment. This Leif can also help work on behavior change and improve sleep. Check out the segment under Calm Masterclass and listen to The 4 Pillars of Health. A great way to begin learning about the foundation of lifestyle with practical tips to use in your every day. Check out www.yourweightmatters. org blog for continued daily support and education along this weight loss journey! Patient Resources:     Personal Training/Fitness Classes/Health Coaching     Tra [address] and Hernandez Sophiaside @ [email or web address]/ Full fitness center with group fitness and personal training. Discount available as client of [facility]. Health Coaching and Personal Training with Shyann Guerra at our [facility]- individual weekly coaching with option to add personal training and small group fitness classes targeted at weight loss- [phone] and/or email @ Fernando [email or web address]. [email or web address]  360FIT [place] [email or web address] Group Fitness [phone] and/or email Mohit Barber at [email or web address]  [address] [facility] with multiple locations: Aetna (www.Petra Systems), Eat The GoNabit (www.Simpler. Robosoft Technologies), Fit Body Bootcamp (www.Bevyp.Robosoft Technologies), Gemin X Pharmaceuticals (www.Xageek), The Exercise  (www.exercisecoach.Robosoft Technologies)     Online Fitness  Fitness  on Whole Foods in 10 DVD series- www. unbrq00CHU. Robosoft Technologies  Sit and Be Fit - Chair exercise series Www.sitandbefit. org  Hip Hop Fit with Alonzo Thrasher at www.hiphopfit. net     Apps for on the KosherSwitch Technologies 7 Minute Workout (orange box with white 7) - free on the go HIIT training leif  Pelotoestee Leif @ www. Klickset Inc.     Nutrition Trackers and Tools  LoseIT! And My Fitness Pal apps and on line for tracking nutrition  NOOM - virtual health coaching  FitFoundation (healthy meals on the go) in [facility] @ www. ylbzpmjkzvhfx9t. Viktoriya Kimbrough MD @ www.Recipharmtromd.com and Sari Prince (keto and low carb plans recommended) @ www. AZYYQA79.OAF, Metabolic Meals @ www. Partners Healthcare GroupMetabolicMeals. com - individual prepared meals to go  Cogeco Cable, mySBX, International Business Machines, Every Plate, Bellicum Pharmaceuticals- on line meal delivery programs for preparation at home  AK Steel Holding Corporation in [facility] Keenan Haddad for homemade meals to go @ www.mealLiPlasome Pharmaage. Lennar Corporation  Diet Doctor @ www. dietdoctor. Lennar Corporation - low carb swaps  Yummly - meal prep and planning leif (www.yummly. com)     Stress Management/Behavior/Mindful Eating  CALM meditation leif (www.Happlink)  Headspace  Am I Hungry? Mindful eating virtual  leif  Www.yourweightmatters. org - Obesity Action Coalition sponsored Blog posts daily  Motivation leif (black box with white \")- daily supportive messages sent to your phone     Books/Video Education/Podcasts  Mindless Eating by Ozzie Syed  Why We Get Sick by Abbey Oscar (a book about insulin resistance)  Atomic Habits by Misael Hollingsworth (a book about taking small steps to promote greater behavior change)   Can't Hurt Me by Geraldine Belcher (a book exploring the power of discipline in achieving your goals)  The End of Dieting: How to Live for Life by Dr. Madalyn Wilson M.D. or listen to The [address] [facility] Episode 61: Understanding \"Nutritarian\" Eating w/Dr. Madalyn Wilson  Your Body in Balance: The World Fuel Services Corporation of Food, Hormones, and Health by Dr. John Wheatley  The Menopause Diet Plan by Juaquin Sorto and [facility] AT [facility]  The Complete Guide to fasting by Dr. John Bautista, [address] [facility] by Hilario Velasco, Ph.D, R.D.   Weight Loss Surgery Will Not Treat Food Addiction by Laurie Quick Ph.D  The Game Changers- skyrockit Documentary on plant based nutrition  Fed Up - documentary about obesity (Free on Utube)  The Truth About Sugar - documentary on sugar (Free on Utube, https://youtu. be/0F5rwfeNT4t)  The Dr. Jermaine Brown by Dr. Chema Roa MD  Fitlosophy Fitspiration - journal to better health (found at [employer] in fitness aisle)  What Happened to You?- a look at the impact trauma has on behavior written by Jill Rose and Dr. Jose Pérez Again by Fernando Casillas - discovering your true self after trauma  Alpa Tee talk on Splinter.me, The Call to My Dentist  Podcasts: The Exam Room by the Physician's Committee, Nutrition Facts by Dr. Israel Lazcano    We are here to support you with weight loss, but please remember that you still need your primary care provider for your routine health maintenance.

## 2023-08-17 ENCOUNTER — OFFICE VISIT (OUTPATIENT)
Dept: ORTHOPEDICS CLINIC | Facility: CLINIC | Age: 48
End: 2023-08-17
Payer: COMMERCIAL

## 2023-08-17 VITALS — WEIGHT: 204 LBS | HEIGHT: 64 IN | BODY MASS INDEX: 34.83 KG/M2

## 2023-08-17 DIAGNOSIS — M76.62 TENDONITIS, ACHILLES, LEFT: Primary | ICD-10-CM

## 2023-08-17 PROCEDURE — 99214 OFFICE O/P EST MOD 30 MIN: CPT | Performed by: PODIATRIST

## 2023-08-17 PROCEDURE — 3008F BODY MASS INDEX DOCD: CPT | Performed by: PODIATRIST

## 2023-08-17 NOTE — PROGRESS NOTES
[facility] Progress Note    Subjective: The patient is here for long-term follow-up of her right foot. 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 She has done pretty well and at this point she states that she is not here for that. She has a new issue of the left foot and ankle. Left back of ankle area  No injury  Swells, hurts with push off activity  Xray normal  With exercise and movement - hurts          Objective:     Exam right foot incision is well-healed. A little bit of tingling with light tapping but overall no discomfort and no swelling     Exam of the left foot and ankle most of her tenderness is at the posterior aspect of the ankle along the Achilles tendon less so medial or lateral but directly posterior at the watershed region. No bony tenderness. Tendo Achilles is tight. Full range of motion of the ankle joint is allowed. Full strength 5 out of 5 all muscle groups. Mild swelling. Palpable pedal pulses and sensation is intact. Imaging: very small inferior calcaneal spur        Assessment/Plan:     Diagnoses and all orders for this visit:    Tendonitis, Achilles, left      We discussed what Achilles tendinitis is and the potential for worsening and potential for care if she does not rest this foot. Has boot at home from surgery and I recommend she goes into the hot for 2 to 3 weeks to allow some healing  A lot of icing      PT -modalities strengthening stretching and gait eval.  Dry needling and [place] if available. Follow-up after physical therapy            Reece Chisholm. Luz Stearns DPM  [facility]    Luminoso speech recognition software was used to prepare this note. If a word or phrase is confusing, it is likely do to a failure of recognition. Please contact me with any questions or clarifications.

## 2023-09-06 ENCOUNTER — OFFICE VISIT (OUTPATIENT)
Dept: ORTHOPEDICS CLINIC | Facility: CLINIC | Age: 48
End: 2023-09-06
Payer: COMMERCIAL

## 2023-09-06 DIAGNOSIS — M25.562 BILATERAL CHRONIC KNEE PAIN: Primary | ICD-10-CM

## 2023-09-06 DIAGNOSIS — M25.561 BILATERAL CHRONIC KNEE PAIN: Primary | ICD-10-CM

## 2023-09-06 DIAGNOSIS — G89.29 BILATERAL CHRONIC KNEE PAIN: Primary | ICD-10-CM

## 2023-09-06 PROCEDURE — 20610 DRAIN/INJ JOINT/BURSA W/O US: CPT | Performed by: ORTHOPAEDIC SURGERY

## 2023-09-06 RX ORDER — TRIAMCINOLONE ACETONIDE 40 MG/ML
80 INJECTION, SUSPENSION INTRA-ARTICULAR; INTRAMUSCULAR ONCE
Status: COMPLETED | OUTPATIENT
Start: 2023-09-06 | End: 2023-09-06

## 2023-09-06 RX ADMIN — TRIAMCINOLONE ACETONIDE 80 MG: 40 INJECTION, SUSPENSION INTRA-ARTICULAR; INTRAMUSCULAR at 11:32:00

## 2023-09-06 NOTE — PROCEDURES
Risks and benefits of knee injection discussed with the patient, with risks including but not limited to pain and swelling at the injection site and/or within the knee joint, infection, elevation in blood pressure and/or glucose levels, facial flushing. After informed consent, the patient's right and left knees were marked, locally anesthetized with skin refrigerant, prepped with topical antiseptic, and injected with a mixture of 1mL 40mg/mL Kenalog, 2mL 1% lidocaine and 2mL 0.5% marcaine through the inferolateral portal.  A band-aid was applied. The patient tolerated the procedure well.     Rony Salgado MD, [address] [facility]  Phone [phone]  Fax [phone]

## 2023-09-15 ENCOUNTER — HOSPITAL ENCOUNTER (EMERGENCY)
Facility: HOSPITAL | Age: 48
Discharge: HOME | End: 2023-09-16
Attending: EMERGENCY MEDICINE
Payer: COMMERCIAL

## 2023-09-15 ENCOUNTER — APPOINTMENT (OUTPATIENT)
Dept: CT IMAGING | Facility: HOSPITAL | Age: 48
End: 2023-09-15
Attending: EMERGENCY MEDICINE
Payer: COMMERCIAL

## 2023-09-15 ENCOUNTER — PATIENT MESSAGE (OUTPATIENT)
Dept: INTERNAL MEDICINE CLINIC | Facility: CLINIC | Age: 48
End: 2023-09-15

## 2023-09-15 VITALS
TEMPERATURE: 98 F | OXYGEN SATURATION: 98 % | SYSTOLIC BLOOD PRESSURE: 151 MMHG | DIASTOLIC BLOOD PRESSURE: 97 MMHG | HEART RATE: 76 BPM | RESPIRATION RATE: 16 BRPM

## 2023-09-15 DIAGNOSIS — R03.0 ELEVATED BLOOD PRESSURE READING: ICD-10-CM

## 2023-09-15 DIAGNOSIS — R51.9 ACUTE INTRACTABLE HEADACHE, UNSPECIFIED HEADACHE TYPE: ICD-10-CM

## 2023-09-15 LAB
ALBUMIN SERPL-MCNC: 4.3 G/DL (ref 3.4–5)
ALBUMIN/GLOB SERPL: 1.2 {RATIO} (ref 1–2)
ALP LIVER SERPL-CCNC: 67 U/L
ALT SERPL-CCNC: 24 U/L
ANION GAP SERPL CALC-SCNC: 4 MMOL/L (ref 0–18)
AST SERPL-CCNC: 16 U/L (ref 15–37)
BASOPHILS # BLD AUTO: 0.03 X10(3) UL (ref 0–0.2)
BASOPHILS NFR BLD AUTO: 0.4 %
BILIRUB SERPL-MCNC: 0.4 MG/DL (ref 0.1–2)
BUN BLD-MCNC: 8 MG/DL (ref 7–18)
CALCIUM BLD-MCNC: 9.4 MG/DL (ref 8.5–10.1)
CHLORIDE SERPL-SCNC: 107 MMOL/L (ref 98–112)
CO2 SERPL-SCNC: 29 MMOL/L (ref 21–32)
CREAT BLD-MCNC: 1 MG/DL
EGFRCR SERPLBLD CKD-EPI 2021: 69 ML/MIN/1.73M2 (ref 60–?)
EOSINOPHIL # BLD AUTO: 0.07 X10(3) UL (ref 0–0.7)
EOSINOPHIL NFR BLD AUTO: 0.9 %
ERYTHROCYTE [DISTWIDTH] IN BLOOD BY AUTOMATED COUNT: 14 %
GLOBULIN PLAS-MCNC: 3.7 G/DL (ref 2.8–4.4)
GLUCOSE BLD-MCNC: 97 MG/DL (ref 70–99)
HCT VFR BLD AUTO: 42 %
HGB BLD-MCNC: 14.4 G/DL
IMM GRANULOCYTES # BLD AUTO: 0.01 X10(3) UL (ref 0–1)
IMM GRANULOCYTES NFR BLD: 0.1 %
LYMPHOCYTES # BLD AUTO: 2.58 X10(3) UL (ref 1–4)
LYMPHOCYTES NFR BLD AUTO: 32.3 %
MCH RBC QN AUTO: 30.1 PG (ref 26–34)
MCHC RBC AUTO-ENTMCNC: 34.3 G/DL (ref 31–37)
MCV RBC AUTO: 87.7 FL
MONOCYTES # BLD AUTO: 0.36 X10(3) UL (ref 0.1–1)
MONOCYTES NFR BLD AUTO: 4.5 %
NEUTROPHILS # BLD AUTO: 4.93 X10 (3) UL (ref 1.5–7.7)
NEUTROPHILS # BLD AUTO: 4.93 X10(3) UL (ref 1.5–7.7)
NEUTROPHILS NFR BLD AUTO: 61.8 %
OSMOLALITY SERPL CALC.SUM OF ELEC: 288 MOSM/KG (ref 275–295)
PLATELET # BLD AUTO: 286 10(3)UL (ref 150–450)
POTASSIUM SERPL-SCNC: 3.7 MMOL/L (ref 3.5–5.1)
PROT SERPL-MCNC: 8 G/DL (ref 6.4–8.2)
RBC # BLD AUTO: 4.79 X10(6)UL
SODIUM SERPL-SCNC: 140 MMOL/L (ref 136–145)
TROPONIN I HIGH SENSITIVITY: 6 NG/L
WBC # BLD AUTO: 8 X10(3) UL (ref 4–11)

## 2023-09-15 PROCEDURE — 99285 EMERGENCY DEPT VISIT HI MDM: CPT

## 2023-09-15 PROCEDURE — 80053 COMPREHEN METABOLIC PANEL: CPT

## 2023-09-15 PROCEDURE — 93010 ELECTROCARDIOGRAM REPORT: CPT

## 2023-09-15 PROCEDURE — 96374 THER/PROPH/DIAG INJ IV PUSH: CPT

## 2023-09-15 PROCEDURE — 84484 ASSAY OF TROPONIN QUANT: CPT | Performed by: EMERGENCY MEDICINE

## 2023-09-15 PROCEDURE — 70450 CT HEAD/BRAIN W/O DYE: CPT | Performed by: EMERGENCY MEDICINE

## 2023-09-15 PROCEDURE — 93005 ELECTROCARDIOGRAM TRACING: CPT

## 2023-09-15 PROCEDURE — 85025 COMPLETE CBC W/AUTO DIFF WBC: CPT | Performed by: EMERGENCY MEDICINE

## 2023-09-15 PROCEDURE — 80053 COMPREHEN METABOLIC PANEL: CPT | Performed by: EMERGENCY MEDICINE

## 2023-09-15 PROCEDURE — 84484 ASSAY OF TROPONIN QUANT: CPT

## 2023-09-15 PROCEDURE — 85025 COMPLETE CBC W/AUTO DIFF WBC: CPT

## 2023-09-15 RX ORDER — KETOROLAC TROMETHAMINE 15 MG/ML
30 INJECTION, SOLUTION INTRAMUSCULAR; INTRAVENOUS ONCE
Status: COMPLETED | OUTPATIENT
Start: 2023-09-15 | End: 2023-09-15

## 2023-09-15 RX ORDER — HYDRALAZINE HYDROCHLORIDE 10 MG/1
10 TABLET, FILM COATED ORAL 4 TIMES DAILY
Qty: 120 TABLET | Refills: 0 | OUTPATIENT
Start: 2023-09-15 | End: 2023-10-15

## 2023-09-15 NOTE — ED INITIAL ASSESSMENT (HPI)
Patient reports c/o headache, blurred vision, and hypertension since Wednesday. Patient rates headache 9 out of 10 on pain scale.

## 2023-09-16 LAB
ATRIAL RATE: 83 BPM
P AXIS: 68 DEGREES
P-R INTERVAL: 160 MS
Q-T INTERVAL: 374 MS
QRS DURATION: 70 MS
QTC CALCULATION (BEZET): 439 MS
R AXIS: 30 DEGREES
T AXIS: 60 DEGREES
VENTRICULAR RATE: 83 BPM

## 2023-09-18 RX ORDER — HYDRALAZINE HYDROCHLORIDE 10 MG/1
10 TABLET, FILM COATED ORAL 4 TIMES DAILY
Qty: 120 TABLET | Refills: 0 | Status: SHIPPED | OUTPATIENT
Start: 2023-09-18

## 2023-10-10 RX ORDER — HYDRALAZINE HYDROCHLORIDE 10 MG/1
10 TABLET, FILM COATED ORAL 4 TIMES DAILY
Qty: 360 TABLET | Refills: 0 | Status: SHIPPED | OUTPATIENT
Start: 2023-10-10

## 2023-10-10 NOTE — TELEPHONE ENCOUNTER
hydrALAZINE 10 MG Oral Tab         Sig: Take 1 tablet (10 mg total) by mouth 4 (four) times daily.     Disp: 360 tablet    Refills: 0    Start: 10/10/2023    Class: Normal    Last ordered: 3 weeks ago (9/18/2023) by John Dumont MD    Hypertension Medications Protocol Lrrqda35/10/2023 04:30 PM   Protocol Details CMP or BMP in past 12 months    Last serum creatinine< 2.0    Appointment in past 6 or next 3 months      To be filled at: [address] [place] [address], [address] [address], [phone], [address] [place] [address] Drive:  8/1/23  RTC:   none  Recent Labs: 9/15/23    Upcoming OV  none

## 2023-11-16 DIAGNOSIS — Z51.81 THERAPEUTIC DRUG MONITORING: ICD-10-CM

## 2023-11-16 DIAGNOSIS — E66.9 OBESITY (BMI 30-39.9): ICD-10-CM

## 2023-11-17 RX ORDER — SEMAGLUTIDE 2.4 MG/.75ML
2.4 INJECTION, SOLUTION SUBCUTANEOUS WEEKLY
Qty: 9 ML | Refills: 0 | Status: SHIPPED | OUTPATIENT
Start: 2023-11-17

## 2023-11-17 NOTE — TELEPHONE ENCOUNTER
Requesting   Requested Prescriptions     Pending Prescriptions Disp Refills    semaglutide-weight management (WEGOVY) 2.4 MG/0.75ML Subcutaneous Solution Auto-injector 9 mL 0     Sig: Inject 0.75 mL (2.4 mg total) into the skin once a week.      LOV: 8/16/23  RTC: 3 months  Filled: 8/16/23 #9 with 0 refills    Future Appointments   Date Time Provider Wendi Ocampo   2/27/2024  9:40 AM Kevin Fair, MJ [facility]

## 2023-11-26 ENCOUNTER — TELEPHONE (OUTPATIENT)
Dept: INTERNAL MEDICINE CLINIC | Facility: CLINIC | Age: 48
End: 2023-11-26

## 2023-11-26 NOTE — TELEPHONE ENCOUNTER
Wegovy approved from 11/26/23 to 11/26/2024  [facility] [place]

## 2023-11-26 NOTE — TELEPHONE ENCOUNTER
PT informed our office that PA is needed for WEgovy 2.4 mg  Entered in [address] (Key: [id number])    2/9/23 215#  8/16/23 204#

## 2023-12-18 ENCOUNTER — PATIENT MESSAGE (OUTPATIENT)
Dept: ORTHOPEDICS CLINIC | Facility: CLINIC | Age: 48
End: 2023-12-18

## 2023-12-18 DIAGNOSIS — G89.29 BILATERAL CHRONIC KNEE PAIN: Primary | ICD-10-CM

## 2023-12-18 DIAGNOSIS — M25.562 BILATERAL CHRONIC KNEE PAIN: Primary | ICD-10-CM

## 2023-12-18 DIAGNOSIS — M25.561 BILATERAL CHRONIC KNEE PAIN: Primary | ICD-10-CM

## 2023-12-19 NOTE — TELEPHONE ENCOUNTER
LOV 9/6/23, steroid injections, bilat knees. Pt states injections did not help much. Bilat knee pain. Feels is affecting L hip now. Muscle spasms, L>R. Has tried RICE, tylenol, ibuprofen, stretching, OTC pain relieving balms/creams. Requesting rx for pain/inflammation, muscle spasms. On low dose aspirin.      Latest Reference Range & Units 09/15/23 18:44   BUN 7 - 18 mg/dL 8   CREATININE 0.55 - 1.02 mg/dL 1.00   CALCIUM 8.5 - 10.1 mg/dL 9.4   EGFR >=60 mL/min/1.73m2 69   ANION GAP 0 - 18 mmol/L 4   CALCULATED OSMOLALITY 275 - 295 mOsm/kg 288   ALKALINE PHOSPHATASE 39 - 100 U/L 67   AST (SGOT) 15 - 37 U/L 16   ALT (SGPT) 13 - 56 U/L 24   Total Bilirubin 0.1 - 2.0 mg/dL 0.4   Globulin 2.8 - 4.4 g/dL 3.7   Troponin I (High Sensitivity) <=54 ng/L 6   A/G Ratio 1.0 - 2.0  1.2   PROTEIN, TOTAL 6.4 - 8.2 g/dL 8.0   Albumin 3.4 - 5.0 g/dL 4.3

## 2023-12-21 RX ORDER — MELOXICAM 15 MG/1
15 TABLET ORAL
Qty: 30 TABLET | Refills: 0 | Status: SHIPPED | OUTPATIENT
Start: 2023-12-21

## 2023-12-21 NOTE — TELEPHONE ENCOUNTER
I sent Meloxicam to the patient's [facility] pharmacy. I really think a follow-up visit with either myself or Dr. Steven Cook is most appropriate to sort through the next steps.

## 2024-01-04 ENCOUNTER — OFFICE VISIT (OUTPATIENT)
Dept: INTERNAL MEDICINE CLINIC | Facility: CLINIC | Age: 49
End: 2024-01-04
Payer: COMMERCIAL

## 2024-01-04 ENCOUNTER — LAB ENCOUNTER (OUTPATIENT)
Dept: LAB | Age: 49
End: 2024-01-04
Attending: INTERNAL MEDICINE
Payer: COMMERCIAL

## 2024-01-04 VITALS
DIASTOLIC BLOOD PRESSURE: 60 MMHG | RESPIRATION RATE: 17 BRPM | SYSTOLIC BLOOD PRESSURE: 120 MMHG | BODY MASS INDEX: 33 KG/M2 | HEART RATE: 101 BPM | TEMPERATURE: 97 F | OXYGEN SATURATION: 97 % | WEIGHT: 195 LBS

## 2024-01-04 DIAGNOSIS — I10 ESSENTIAL HYPERTENSION, BENIGN: Primary | ICD-10-CM

## 2024-01-04 DIAGNOSIS — M25.561 PAIN IN BOTH KNEES, UNSPECIFIED CHRONICITY: ICD-10-CM

## 2024-01-04 DIAGNOSIS — M25.562 PAIN IN BOTH KNEES, UNSPECIFIED CHRONICITY: ICD-10-CM

## 2024-01-04 DIAGNOSIS — K64.9 HEMORRHOIDS, UNSPECIFIED HEMORRHOID TYPE: ICD-10-CM

## 2024-01-04 DIAGNOSIS — H15.009 SCLERITIS, UNSPECIFIED LATERALITY: ICD-10-CM

## 2024-01-04 DIAGNOSIS — I10 ESSENTIAL HYPERTENSION, BENIGN: ICD-10-CM

## 2024-01-04 LAB
ALBUMIN SERPL-MCNC: 3.9 G/DL (ref 3.4–5)
ALBUMIN/GLOB SERPL: 1.2 {RATIO} (ref 1–2)
ALP LIVER SERPL-CCNC: 61 U/L
ALT SERPL-CCNC: 15 U/L
ANION GAP SERPL CALC-SCNC: 3 MMOL/L (ref 0–18)
AST SERPL-CCNC: 6 U/L (ref 15–37)
BASOPHILS # BLD AUTO: 0.04 X10(3) UL (ref 0–0.2)
BASOPHILS NFR BLD AUTO: 0.6 %
BILIRUB SERPL-MCNC: 0.3 MG/DL (ref 0.1–2)
BUN BLD-MCNC: 15 MG/DL (ref 9–23)
CALCIUM BLD-MCNC: 9.2 MG/DL (ref 8.5–10.1)
CHLORIDE SERPL-SCNC: 108 MMOL/L (ref 98–112)
CO2 SERPL-SCNC: 30 MMOL/L (ref 21–32)
CREAT BLD-MCNC: 0.91 MG/DL
CRP SERPL-MCNC: <0.29 MG/DL (ref ?–0.3)
EGFRCR SERPLBLD CKD-EPI 2021: 77 ML/MIN/1.73M2 (ref 60–?)
EOSINOPHIL # BLD AUTO: 0.12 X10(3) UL (ref 0–0.7)
EOSINOPHIL NFR BLD AUTO: 1.8 %
ERYTHROCYTE [DISTWIDTH] IN BLOOD BY AUTOMATED COUNT: 13.7 %
ERYTHROCYTE [SEDIMENTATION RATE] IN BLOOD: 28 MM/HR
FASTING STATUS PATIENT QL REPORTED: NO
GLOBULIN PLAS-MCNC: 3.2 G/DL (ref 2.8–4.4)
GLUCOSE BLD-MCNC: 75 MG/DL (ref 70–99)
HCT VFR BLD AUTO: 39.3 %
HGB BLD-MCNC: 12.9 G/DL
IMM GRANULOCYTES # BLD AUTO: 0.01 X10(3) UL (ref 0–1)
IMM GRANULOCYTES NFR BLD: 0.2 %
LYMPHOCYTES # BLD AUTO: 2.74 X10(3) UL (ref 1–4)
LYMPHOCYTES NFR BLD AUTO: 41.8 %
MCH RBC QN AUTO: 30.3 PG (ref 26–34)
MCHC RBC AUTO-ENTMCNC: 32.8 G/DL (ref 31–37)
MCV RBC AUTO: 92.3 FL
MONOCYTES # BLD AUTO: 0.43 X10(3) UL (ref 0.1–1)
MONOCYTES NFR BLD AUTO: 6.6 %
NEUTROPHILS # BLD AUTO: 3.22 X10 (3) UL (ref 1.5–7.7)
NEUTROPHILS # BLD AUTO: 3.22 X10(3) UL (ref 1.5–7.7)
NEUTROPHILS NFR BLD AUTO: 49 %
OSMOLALITY SERPL CALC.SUM OF ELEC: 292 MOSM/KG (ref 275–295)
PLATELET # BLD AUTO: 241 10(3)UL (ref 150–450)
POTASSIUM SERPL-SCNC: 4.1 MMOL/L (ref 3.5–5.1)
PROT SERPL-MCNC: 7.1 G/DL (ref 6.4–8.2)
RBC # BLD AUTO: 4.26 X10(6)UL
RHEUMATOID FACT SERPL-ACNC: <10 IU/ML (ref ?–15)
SODIUM SERPL-SCNC: 141 MMOL/L (ref 136–145)
WBC # BLD AUTO: 6.6 X10(3) UL (ref 4–11)

## 2024-01-04 PROCEDURE — 80053 COMPREHEN METABOLIC PANEL: CPT

## 2024-01-04 PROCEDURE — 36415 COLL VENOUS BLD VENIPUNCTURE: CPT

## 2024-01-04 PROCEDURE — 83516 IMMUNOASSAY NONANTIBODY: CPT

## 2024-01-04 PROCEDURE — 86038 ANTINUCLEAR ANTIBODIES: CPT

## 2024-01-04 PROCEDURE — 86140 C-REACTIVE PROTEIN: CPT

## 2024-01-04 PROCEDURE — 99214 OFFICE O/P EST MOD 30 MIN: CPT | Performed by: INTERNAL MEDICINE

## 2024-01-04 PROCEDURE — 85652 RBC SED RATE AUTOMATED: CPT

## 2024-01-04 PROCEDURE — 86037 ANCA TITER EACH ANTIBODY: CPT

## 2024-01-04 PROCEDURE — 3078F DIAST BP <80 MM HG: CPT | Performed by: INTERNAL MEDICINE

## 2024-01-04 PROCEDURE — 85025 COMPLETE CBC W/AUTO DIFF WBC: CPT

## 2024-01-04 PROCEDURE — 3074F SYST BP LT 130 MM HG: CPT | Performed by: INTERNAL MEDICINE

## 2024-01-04 PROCEDURE — 86431 RHEUMATOID FACTOR QUANT: CPT

## 2024-01-04 RX ORDER — METOPROLOL SUCCINATE 25 MG/1
12.5 TABLET, EXTENDED RELEASE ORAL DAILY
Qty: 45 TABLET | Refills: 0 | Status: SHIPPED | OUTPATIENT
Start: 2024-01-04

## 2024-01-04 NOTE — PROGRESS NOTES
Subjective:   María Elena Hurtado is a 48 year old female  who presents for No chief complaint on file.     States she went to get eye exam and was told she had \"autoimmune eye disease\" but she does not know what the name is.   Was recommended to do steroids but she declined.     Looked at [facility]where ([facility]) She was given indomethacin for 7 days (to hold meloxicam while on it).   Per [facility] - scleritis was listed as diagnosis     BL knee pain- following with ortho.   HTN- controlled.   Hemorrhoids- per pt was told she may need surgical correction.   Constipation- has been on fiber supplements which help bu the main issue is passing through the hemorrhoids.   History/Other:    No Further Nursing Notes to Review  Allergies Reviewed  Medications   Reviewed  OB Status Reviewed         Current Outpatient Medications   Medication Sig Dispense Refill    metoprolol succinate ER 25 MG Oral Tablet 24 Hr Take 0.5 tablets (12.5 mg total) by mouth daily. 45 tablet 0    Meloxicam 15 MG Oral Tab Take 1 tablet (15 mg total) by mouth daily with food. 30 tablet 0    semaglutide-weight management (WEGOVY) 2.4 MG/0.75ML Subcutaneous Solution Auto-injector Inject 0.75 mL (2.4 mg total) into the skin once a week. 9 mL 0    FLUTICASONE PROPIONATE 50 MCG/ACT Nasal Suspension SPRAY 2 SPRAYS BY NASAL ROUTE DAILY 48 mL 1    CETIRIZINE 10 MG Oral Tab TAKE 1 TABLET BY MOUTH EVERY DAY 90 tablet 1    Olopatadine HCl ([facility] OLOPATADINE HCL) 0.2 % Ophthalmic Solution Apply 1 drop to eye daily. INSTILL 1 DROP INTO AFFECTED EYE EVERY DAY 2.5 mL 5    montelukast 10 MG Oral Tab TAKE 1 TABLET BY MOUTH EVERY DAY AT NIGHT 90 tablet 3    ADVAIR DISKUS 250-50 MCG/ACT Inhalation Aerosol Powder, Breath Activated Inhale into the lungs 2 (two) times daily.      amLODIPine 5 MG Oral Tab Take 2 tablets (10 mg total) by mouth daily. 90 tablet 3    triamcinolone 0.1 % External Cream Apply topically 2 (two) times daily as needed. 60 g 0    albuterol 108 (90  Base) MCG/ACT Inhalation Aero Soln Inhale 2 puffs into the lungs every 4 (four) hours as needed for Wheezing or Shortness of Breath. 3 each 2    Polyethylene Glycol 3350 (MIRALAX) 17 g Oral Powd Pack Take 17 g by mouth 2 (two) times daily. 90 packet 3    losartan 100 MG Oral Tab Take 1 tablet (100 mg total) by mouth daily. 90 tablet 3    hydrocortisone (ANUSOL-HC) 2.5 % External Cream Twice daily for 7 days 2 each 0    aspirin 81 MG Oral Tab EC Take 1 tablet (81 mg total) by mouth daily. 30 tablet 0       Review of Systems:  Pertinent items are noted in HPI.  A comprehensive 10 point review of systems was completed.  Pertinent positives and negatives noted in the the HPI.        Objective:   /60 (BP Location: Left arm, Patient Position: Sitting, Cuff Size: adult)   Pulse 101   Temp 97.3 °F (36.3 °C) (Temporal)   Resp 17   Wt 195 lb (88.5 kg)   LMP 09/20/2010   SpO2 97%   BMI 33.47 kg/m²  Estimated body mass index is 33.47 kg/m² as calculated from the following:    Height as of 8/17/23: 5' 4\" (1.626 m).    Weight as of this encounter: 195 lb (88.5 kg).  PHYSICAL EXAM:   General: no acute distress   Eyes: pupils equal and reactive; EOMI; sclera normal; conjunctiva normal with clinic light-not dilated exam; to follow-upwith ophthalmology   Respiratory: no increased work of breathing; good air exchange; CTAB; no crackles or wheezing   Cardiovascular: RRR; S1, S2; no edema   Neurological: awake, alert, oriented x3; CNII-XII grossly intact  MSK: knee pain with back pain   Behavioral/Psych: euthymic; appropriate affect   Rectal: declined exam  Assessment & Plan:     María Elena was seen today for other.    Diagnoses and all orders for this visit:    Scleritis, unspecified laterality  -     Anti-Nuclear Antibody (ISAEL) by IFA Screen, Reflex Titer; Future  -     Rheumatoid Arthritis Factor; Future  -     ANCA Panel Vasculitis; Future  -     CBC W Differential W Platelet [E]; Future  -     Comp Metabolic Panel (14)  [E]; Future  -     Sed Rate, Westergren (Automated) [E]; Future  -     C-Reactive Protein [E]; Future  - to follow-up with ophthalmology as directed     Hemorrhoids, unspecified hemorrhoid type  -     Surgery Referral - In Network  -supportive care    Essential hypertension, benign  -     CBC W Differential W Platelet [E]; Future  -     Comp Metabolic Panel (14) [E]; Future  -losartan, amlodipine   -  if BP persistently greater than 130s/80s then to add   metoprolol succinate ER 25 MG Oral Tablet 24 Hr; Take 0.5 tablets (12.5 mg total) by mouth daily. Stopped hydralazine given side effects   -close follow-up     Pain in both knees, unspecified chronicity  -     CBC W Differential W Platelet [E]; Future  -     Comp Metabolic Panel (14) [E]; Future  -     Sed Rate, Westergren (Automated) [E]; Future  -     C-Reactive Protein [E]; Future  -RF  - follow-up with ortho               Dai Slaughter MD

## 2024-01-05 LAB
ANTI-MPO ANTIBODIES: <0.2 UNITS
ANTI-PR3 ANTIBODIES: <0.2 UNITS

## 2024-01-08 LAB — NUCLEAR IGG TITR SER IF: NEGATIVE {TITER}

## 2024-01-17 DIAGNOSIS — G89.29 BILATERAL CHRONIC KNEE PAIN: ICD-10-CM

## 2024-01-17 DIAGNOSIS — M25.562 BILATERAL CHRONIC KNEE PAIN: ICD-10-CM

## 2024-01-17 DIAGNOSIS — M25.561 BILATERAL CHRONIC KNEE PAIN: ICD-10-CM

## 2024-01-17 RX ORDER — MELOXICAM 15 MG/1
15 TABLET ORAL
Qty: 30 TABLET | Refills: 0 | Status: SHIPPED | OUTPATIENT
Start: 2024-01-17

## 2024-01-17 NOTE — TELEPHONE ENCOUNTER
Meloxicam    Last OV: 09/06/23  Last refill date: 12/21/23 #/refills: 30/0  Upcoming appt:   Future Appointments   Date Time Provider Department Center   1/29/2024  9:40 AM Ryland Torres MD [facility]     [facility]

## 2024-01-29 ENCOUNTER — HOSPITAL ENCOUNTER (OUTPATIENT)
Dept: GENERAL RADIOLOGY | Age: 49
Discharge: HOME | End: 2024-01-29
Attending: ORTHOPAEDIC SURGERY
Payer: COMMERCIAL

## 2024-01-29 ENCOUNTER — OFFICE VISIT (OUTPATIENT)
Dept: ORTHOPEDICS CLINIC | Facility: CLINIC | Age: 49
End: 2024-01-29
Payer: COMMERCIAL

## 2024-01-29 DIAGNOSIS — M25.561 PAIN IN BOTH KNEES, UNSPECIFIED CHRONICITY: ICD-10-CM

## 2024-01-29 DIAGNOSIS — M79.604 BILATERAL LOWER EXTREMITY PAIN: ICD-10-CM

## 2024-01-29 DIAGNOSIS — M25.561 PAIN IN BOTH KNEES, UNSPECIFIED CHRONICITY: Primary | ICD-10-CM

## 2024-01-29 DIAGNOSIS — M25.562 PAIN IN BOTH KNEES, UNSPECIFIED CHRONICITY: ICD-10-CM

## 2024-01-29 DIAGNOSIS — M25.562 PAIN IN BOTH KNEES, UNSPECIFIED CHRONICITY: Primary | ICD-10-CM

## 2024-01-29 DIAGNOSIS — M79.605 BILATERAL LOWER EXTREMITY PAIN: ICD-10-CM

## 2024-01-29 PROCEDURE — 73564 X-RAY EXAM KNEE 4 OR MORE: CPT | Performed by: ORTHOPAEDIC SURGERY

## 2024-01-29 PROCEDURE — 99213 OFFICE O/P EST LOW 20 MIN: CPT | Performed by: ORTHOPAEDIC SURGERY

## 2024-01-29 RX ORDER — INDOMETHACIN 25 MG/1
CAPSULE ORAL
COMMUNITY
Start: 2024-01-11

## 2024-01-29 NOTE — PROGRESS NOTES
[facility] Progress Note    Subjective: Patient returns to clinic for bilateral knees.  When asked where pain occurs, she traces around the proximal thigh, down the thighs anteriorly towards the knee, also down the leg towards the ankles.  She states she feels like she has muscle pain and cramping.  She states that the injections performed 4 months ago provided minimal relief, brought the pain from a 10 down to a 7.  It only lasted about a month.  She is asking about any muscle relaxer medications or further treatments.  She did start meloxicam which has only helped somewhat.    Objective: Patient appears comfortable.  No effusion to the knees.  No focal tenderness about the knee joint lines or patellofemoral joint.      Imaging: X-rays of the right and left knees personally viewed, independently interpreted and radiology report read.  Demonstrates largely maintained tibiofemoral joint space on both the AP and PA flexion views, possible osteophytic lipping.  Joint space also maintained on the lateral view.  Ross view demonstrates moderate patellofemoral osteoarthritis of the right knee, mild of the left, with subchondral sclerosis and joint space narrowing.      Assessment/Plan: 48-year-old female with bilateral lower extremity pain, radiographic mild osteoarthritis on the left and moderate patellofemoral on the right (maintained tibiofemoral joint space).  She has not had great relief from injections, and given her description of pain (radiating from thigh down the leg towards the foot, muscular cramping), we did discuss the possibility that the symptoms are not related to knee arthritis which is radiographically really more so in the right knee than the left, despite her symptoms being bilateral.  We discussed other potential etiologies including referred source of pain, nerve or muscle etiology given some of her complaints of radiation and cramping.  Advised that further injection for the knee would  likely not provide much benefit given her previous response.  Did discuss potential for referral to physiatry for further evaluation and workup, she would like to proceed with this and a referral has been placed.    Ryland Torres MD, FAAOS  [facility]  Phone [phone]  Fax [phone]

## 2024-02-05 ENCOUNTER — OFFICE VISIT (OUTPATIENT)
Dept: INTERNAL MEDICINE CLINIC | Facility: CLINIC | Age: 49
End: 2024-02-05
Payer: COMMERCIAL

## 2024-02-05 VITALS
HEART RATE: 94 BPM | HEIGHT: 64 IN | BODY MASS INDEX: 33.29 KG/M2 | DIASTOLIC BLOOD PRESSURE: 78 MMHG | RESPIRATION RATE: 16 BRPM | WEIGHT: 195 LBS | SYSTOLIC BLOOD PRESSURE: 124 MMHG

## 2024-02-05 DIAGNOSIS — K59.00 CONSTIPATION, UNSPECIFIED CONSTIPATION TYPE: ICD-10-CM

## 2024-02-05 DIAGNOSIS — I10 ESSENTIAL HYPERTENSION, BENIGN: Primary | ICD-10-CM

## 2024-02-05 DIAGNOSIS — Z51.81 THERAPEUTIC DRUG MONITORING: ICD-10-CM

## 2024-02-05 DIAGNOSIS — R73.03 PREDIABETES: ICD-10-CM

## 2024-02-05 DIAGNOSIS — E66.9 OBESITY (BMI 30-39.9): ICD-10-CM

## 2024-02-05 PROCEDURE — 99214 OFFICE O/P EST MOD 30 MIN: CPT | Performed by: NURSE PRACTITIONER

## 2024-02-05 PROCEDURE — 3074F SYST BP LT 130 MM HG: CPT | Performed by: NURSE PRACTITIONER

## 2024-02-05 PROCEDURE — 3008F BODY MASS INDEX DOCD: CPT | Performed by: NURSE PRACTITIONER

## 2024-02-05 PROCEDURE — 3078F DIAST BP <80 MM HG: CPT | Performed by: NURSE PRACTITIONER

## 2024-02-05 RX ORDER — SEMAGLUTIDE 2.4 MG/.75ML
2.4 INJECTION, SOLUTION SUBCUTANEOUS WEEKLY
Qty: 9 ML | Refills: 0 | Status: SHIPPED | OUTPATIENT
Start: 2024-02-05

## 2024-02-05 RX ORDER — LOSARTAN POTASSIUM 100 MG/1
100 TABLET ORAL DAILY
Qty: 90 TABLET | Refills: 0 | Status: SHIPPED | OUTPATIENT
Start: 2024-02-05

## 2024-02-05 NOTE — PROGRESS NOTES
HISTORY OF PRESENT ILLNESS  Chief Complaint   Patient presents with    Weight Check     Lost 9 pounds     María Elena Hurtado is a 48 year old female here for follow up with medical weight loss program for the treatment of overweight, obesity, or morbid obesity.     Down 9 lbs (f/u from 8/2023)   Compliant on wegovy 2.4mg weekly   Tolerating well, helping with decreasing appetite and no side effects     Needs to see specialist for bilat. Knee pain, and lower back pain   High stress, is trying to find a job  Not sleeping well (hot flashes and stress)   Exercise/Activity: 3x/ week, via walking, indoor bike, strength training- limited due to knee pain  Nutrition: eating regular meals, +protein, minimal veggies. +inter. tracking reports  Meals out per week on average: 1  Stress is manageable   Sleep: 4 hours/night, waking up feeling tired (stress)- has had to take benadryl     Denies chest pain, shortness of breath, dizziness, blurred vision, headache, paresthesia, nausea/vomiting.     Breakfast Lunch Dinner Snacks Fluids   Reviewed              Wt Readings from Last 6 Encounters:   02/05/24 195 lb (88.5 kg)   01/04/24 195 lb (88.5 kg)   08/17/23 204 lb (92.5 kg)   08/16/23 204 lb (92.5 kg)   08/01/23 207 lb (93.9 kg)   05/25/23 216 lb (98 kg)          REVIEW OF SYSTEMS  GENERAL: feels well otherwise, denied any fevers chills or night sweats   LUNGS: denies shortness of breath  CARDIOVASCULAR: denies chest pain  GI: denies abdominal pain  MUSCULOSKELETAL: +chronic back pain, +joint pains (bilat. Knee pain)   PSYCH: denies change in behavior or mood, denies feeling sad or depressed    EXAM  /78   Pulse 94   Resp 16   Ht 5' 4\" (1.626 m)   Wt 195 lb (88.5 kg)   LMP 09/20/2010   BMI 33.47 kg/m²       GENERAL: well developed, well nourished, in no apparent distress, A/O x3  SKIN: no rashes, no suspicious lesions  HEENT: atraumatic, normocephalic, OP-clear, PERRLA  NECK: supple, no adenopathy  LUNGS: CTA in  all fields, breathing non labored  CARDIO: RRR without murmur  GI: +BS, NT/ND, no masses or HSM  EXTREMITIES: no cyanosis, no clubbing, no edema    Lab Results   Component Value Date    GLU 75 01/04/2024    BUN 15 01/04/2024    BUNCREA 8.8 (L) 12/08/2019    CREATSERUM 0.91 01/04/2024    ANIONGAP 3 01/04/2024    GFR 78 09/20/2016    GFRNAA 63 05/28/2022    GFRAA 73 05/28/2022    CA 9.2 01/04/2024    OSMOCALC 292 01/04/2024    ALKPHO 61 01/04/2024    AST 6 (L) 01/04/2024    ALT 15 01/04/2024    BILT 0.3 01/04/2024    TP 7.1 01/04/2024    ALB 3.9 01/04/2024    GLOBULIN 3.2 01/04/2024     01/04/2024    K 4.1 01/04/2024     01/04/2024    CO2 30.0 01/04/2024     Lab Results   Component Value Date     08/07/2023    A1C 5.9 (H) 08/07/2023     Lab Results   Component Value Date    CHOLEST 184 08/07/2023    TRIG 76 08/07/2023    HDL 52 08/07/2023     (H) 08/07/2023    VLDL 13 08/07/2023    NONHDLC 132 (H) 08/07/2023     No results found for: \"B12\", \"VITB12\"  Lab Results   Component Value Date    VITD 22.3 (L) 08/07/2023       Current Outpatient Medications on File Prior to Visit   Medication Sig Dispense Refill    losartan 100 MG Oral Tab TAKE 1 TABLET BY MOUTH EVERY DAY 90 tablet 0    indomethacin 25 MG Oral Cap every 28 days. FOR 21 DAYS IN, THEN REMOVE FOR 7 DAYS      Meloxicam 15 MG Oral Tab Take 1 tablet (15 mg total) by mouth daily with food. 30 tablet 0    metoprolol succinate ER 25 MG Oral Tablet 24 Hr Take 0.5 tablets (12.5 mg total) by mouth daily. 45 tablet 0    FLUTICASONE PROPIONATE 50 MCG/ACT Nasal Suspension SPRAY 2 SPRAYS BY NASAL ROUTE DAILY 48 mL 1    CETIRIZINE 10 MG Oral Tab TAKE 1 TABLET BY MOUTH EVERY DAY 90 tablet 1    Olopatadine HCl ([facility]S OLOPATADINE HCL) 0.2 % Ophthalmic Solution Apply 1 drop to eye daily. INSTILL 1 DROP INTO AFFECTED EYE EVERY DAY 2.5 mL 5    montelukast 10 MG Oral Tab TAKE 1 TABLET BY MOUTH EVERY DAY AT NIGHT 90 tablet 3    ADVAIR DISKUS 250-50 MCG/ACT  Inhalation Aerosol Powder, Breath Activated Inhale into the lungs 2 (two) times daily.      amLODIPine 5 MG Oral Tab Take 2 tablets (10 mg total) by mouth daily. 90 tablet 3    triamcinolone 0.1 % External Cream Apply topically 2 (two) times daily as needed. 60 g 0    albuterol 108 (90 Base) MCG/ACT Inhalation Aero Soln Inhale 2 puffs into the lungs every 4 (four) hours as needed for Wheezing or Shortness of Breath. 3 each 2    Polyethylene Glycol 3350 (MIRALAX) 17 g Oral Powd Pack Take 17 g by mouth 2 (two) times daily. 90 packet 3    hydrocortisone (ANUSOL-HC) 2.5 % External Cream Twice daily for 7 days 2 each 0    aspirin 81 MG Oral Tab EC Take 1 tablet (81 mg total) by mouth daily. 30 tablet 0     No current facility-administered medications on file prior to visit.       ASSESSMENT/PLAN    ICD-10-CM    1. Essential hypertension, benign  I10       2. Therapeutic drug monitoring  Z51.81 semaglutide-weight management (WEGOVY) 2.4 MG/0.75ML Subcutaneous Solution Auto-injector      3. Obesity (BMI 30-39.9)  E66.9 semaglutide-weight management (WEGOVY) 2.4 MG/0.75ML Subcutaneous Solution Auto-injector      4. Prediabetes  R73.03       5. Constipation, unspecified constipation type  K59.00           PLAN   Initial Weight Data and Goal Weight Loss:  Initial consult: #215 lbs on 2/2023  Weight Calculations  Initial Weight: 215 lbs  Initial Weight Date: 01/02/23  Today's Weight: 195 lbs  5% Goal: 10.75  10% Goal: 21.5  Total Weight Loss: 20 lbs  Total weight loss: Down 9 lbs total, Net loss 20 lbs  Continue with medications: wegovy 2.4mg weekly     --advised of side effects and adverse effects of this medication  Contradictions: has done phentermine in the past, ozempic, rybelsus    Reviewed labs  HTN  stable, follows with PCP  prediabetes, reviewed last a1c 5.9% on 8/2023  Joint pain, encouraged aqua therapy, going to see specialist this week   Interm. Snoring, can think about possibly doing a sleep study in the future    Wrote out macros and encouraged tracking food   Nutrition: Low carb diet, recommended to eat breakfast daily/ regular protein intake  Follow up with dietitian and psychologist as recommended.  Discussed the role of sleep and stress in weight management.  Counseled on comprehensive weight loss plan including attention to nutrition, exercise and behavior/stress management for success. See patient instruction below for more details.  Discussed strategies to overcome barriers to successful weight loss and weight maintenance  FITTE: ACSM recommendations (150-300 minutes/ week in active weight loss)   Weight Loss Consent to treat reviewed and signed.    Total time spent on chart review, pre-charting, obtaining history, counseling, and educating, reviewing labs was 30 minutes.       NOTE TO PATIENT: The 21st Century Cures Act makes clinical notes like these available to patients in the interest of transparency. Clinical notes are medical documents used by physicians and care providers to communicate with each other. These documents include medical language and terminology, abbreviations, and treatment information that may sound technical and at times possibly unfamiliar. In addition, at times, the verbiage may appear blunt or direct. These documents are one tool providers use to communicate relevant information and clinical opinions of the care providers in a way that allows common understanding of the clinical context.     Patient Instructions   Next steps:  1.  Fill your prescribed medication and take as discussed and prescribed: wegovy 2.4mg weekly   2.  Schedule a personal nutrition consultation with one of our registered dieticians     Please try to work on the following dietary changes:  Daily protein recommendation to start:  grams  Daily carbohydrate: <110g  Daily calories: 1,300-1,400  1.  Drink water with meals and throughout the day, cut down on soda and/or juice if consumed. Consider flavored water  options like Bubbly, Spindrift, Hint and Naren.  2.  Eat breakfast daily and focus on having protein with each meal, examples include: greek yogurt, cottage cheese, hard boiled egg, whole grain toast with peanut butter.   3.  Reduce refined carbohydrates and sugars which includes items such as sweets, as well as rice, pasta, and bread and make sure to choose whole grain options when having them with just 1 serving per meal about the size of your inner palm.  4.  Consume non starchy veggies daily working towards making them a good 50% of your daily food intake. Add them to lunch and dinner consistently.  5.  Start a daily probiotic: VSL#3 is recommended, (order on line at www.vsl3.com). Take 1 capsule daily with water for 30 days, then reduce to 1 every other day (this will reduce the cost). Capsules can be left out for 2 weeks, but then must be refrigerated.      Please download leif My Fitness Pal, LoseIt! Or Net Diary to monitor daily dietary intake and you will be able to see if you are eating the right amount of calories or too much or too little which would hinder weight loss. Additionally this will help to see your daily carbohydrate and protein intake. When you set the leif up choose 1-2 lbs/week as a goal.  Keeping a paper food journal is an option as well to remain accountable for your choices- this is the start to mindful eating! A low calorie diet has been consistently shown to support weight loss.     Continue or start exercising to help establish a routine. If not already exercising begin with 1 day and progress as able with long-term goal of 30 minutes 5 days a week at a minimum.     Meditation daily can help manage and control stress. Chronic stress can make weight loss difficult.  Exercising is one way to help with stress, but meditation using the CALM Leif or another comparable alternative can be done in your home or place of work with little time commitment. This Leif can also help work on behavior  change and improve sleep. Check out the segment under Calm Masterclass and listen to The 4 Pillars of Health. A great way to begin learning about the foundation of lifestyle with practical tips to use in your every day.     Check out www.yourweightmatters.org blog for continued daily support and education along this weight loss journey!    Patient Resources:     Personal Training/Fitness Classes/Health Coaching     [facility] @ [email or web address] Full fitness center with group fitness and personal training. Discount available as client of [facility].  Health Coaching and Personal Training with Nikki Diallo at our [facility]- individual weekly coaching with option to add personal training and small group fitness classes targeted at weight loss- [phone] and/or email @ [email or web address]  360FIT [place] [email or web address]. Group Fitness [phone] and/or email [place] at [email or web address]  [facility] Fitness Centers with multiple locations: Sulia (www.PRX Control Solutions), [employer] (www.eatthefrogfitness.com), Fit Body Bootcamp (www.Ranku), Cartoon Doll Emporium (www.Observable Networks), The Exercise  (www.exercisecoach.CURRENT)     Online Fitness  Fitness  on Utube  Fit in 10 DVD series- www.qscvg93FGY.com  Sit and Be Fit - Chair exercise series Www.sitandbefit.org  Hip Hop Fit with Alonzo Thrasher at www.hiphopfit.net     Apps for on the Go Fitness  [facility] 7 Minute Workout (orange box with white 7) - free on the go HIIT training leif  Peloton Leif @ www.onepeloton.com     Nutrition Trackers and Tools  LoseIT! And My Fitness Pal apps and on line for tracking nutrition  NOOM - virtual health coaching  FitFoundation (healthy meals on the go) in [place] @ www.ynkjkmfohaqvj1a.CURRENT  Bistrbenji MD @ www.bistromd.com and Wqrzow98 (keto and low carb plans  recommended) @ www.advbkh51.com, Metabolic Meals @ www.MyMetabolicMeals.com - individual prepared meals to go  Gobble, Blue Apron, Home , Every Plate, Sunbasket- on line meal delivery programs for preparation at home  Meal Village in [place] for homemade meals to go @ www.mealvillage.Soricimed  Diet Doctor @ www.dietdoctor.com - low carb swaps  Monesbat - meal prep and planning stella (www.yummly.com)     Stress Management/Behavior/Mindful Eating  CALM meditation stella (www.calm.com)  Headspace  Am I Hungry? Mindful eating virtual  stella  Www.your10sec.org - Obesity Action Coalition sponsored Blog posts daily  Motivation stella (black box with white \")- daily supportive messages sent to your phone     Books/Video Education/Podcasts  Mindless Eating by Galo Wilkinson  Why We Get Sick by Librado Lemons (a book about insulin resistance)  Atomic Habits by Khari Chang (a book about taking small steps to promote greater behavior change)   Can't Hurt Me by Davi Perry (a book exploring the power of discipline in achieving your goals)  The End of Dieting: How to Live for Life by Dr. Eliel Hussein M.D. or listen to The Branders.com Podcast Episode 63: Understanding \"Nutritarian\" Eating w/Dr. Eliel Hussein  Your Body in Balance: The New Science of Food, Hormones, and Health by Dr. Mckay Plascencia  The Menopause Diet Plan by Kirsty Nichols and Elyse Cobos  The Complete Guide to fasting by Dr. Omalley  Sugar, Salt & Fat by Mary Harrison, Ph.D, R.D.  Weight Loss Surgery Will Not Treat Food Addiction by Cheryl Larsen Ph.D  The Game Changers- Netflix Documentary on plant based nutrition  Fed Up - documentary about obesity (Free on Utube)  The Truth About Sugar - documentary on sugar (Free on Utube, https://youtu.be/5W1ggngAA4v)  The Dr. Baird T5 Wellness Plan by Dr. Lex Baird MD  Fitlosophy Fitspiration - journal to better health (found at [employer] in fitness aisle)  What Happened to You?- a look at the impact trauma has on  behavior written by Bharti Washington and Dr. Norris Patel  Whole Again by Gab Stewart - discovering your true self after trauma  Ishmael Lanza talk on INTEGRATED BIOPHARMA, The Call to Gr8erMinds  Podcasts: The Exam Room by the Physician's Committee, Nutrition Facts by Dr. Claros    We are here to support you with weight loss, but please remember that you still need your primary care provider for your routine health maintenance.      Return in about 3 months (around 5/5/2024) for weight management.    Patient verbalizes understanding.    Margy Bhatia, APRN

## 2024-02-05 NOTE — PATIENT INSTRUCTIONS
Next steps:  1.  Fill your prescribed medication and take as discussed and prescribed: wegovy 2.4mg weekly   2.  Schedule a personal nutrition consultation with one of our registered dieticians     Please try to work on the following dietary changes:  Daily protein recommendation to start:  grams  Daily carbohydrate: <110g  Daily calories: 1,300-1,400  1.  Drink water with meals and throughout the day, cut down on soda and/or juice if consumed. Consider flavored water options like Bubbly, Spindrift, Hint and Naren.  2.  Eat breakfast daily and focus on having protein with each meal, examples include: greek yogurt, cottage cheese, hard boiled egg, whole grain toast with peanut butter.   3.  Reduce refined carbohydrates and sugars which includes items such as sweets, as well as rice, pasta, and bread and make sure to choose whole grain options when having them with just 1 serving per meal about the size of your inner palm.  4.  Consume non starchy veggies daily working towards making them a good 50% of your daily food intake. Add them to lunch and dinner consistently.  5.  Start a daily probiotic: VSL#3 is recommended, (order on line at www.vsl3.com). Take 1 capsule daily with water for 30 days, then reduce to 1 every other day (this will reduce the cost). Capsules can be left out for 2 weeks, but then must be refrigerated.      Please download stella My Fitness Pal, LoseIt! Or Net Diary to monitor daily dietary intake and you will be able to see if you are eating the right amount of calories or too much or too little which would hinder weight loss. Additionally this will help to see your daily carbohydrate and protein intake. When you set the stella up choose 1-2 lbs/week as a goal.  Keeping a paper food journal is an option as well to remain accountable for your choices- this is the start to mindful eating! A low calorie diet has been consistently shown to support weight loss.     Continue or start exercising to  help establish a routine. If not already exercising begin with 1 day and progress as able with long-term goal of 30 minutes 5 days a week at a minimum.     Meditation daily can help manage and control stress. Chronic stress can make weight loss difficult.  Exercising is one way to help with stress, but meditation using the CALM Leif or another comparable alternative can be done in your home or place of work with little time commitment. This Leif can also help work on behavior change and improve sleep. Check out the segment under Calm Masterclass and listen to The 4 Pillars of Health. A great way to begin learning about the foundation of lifestyle with practical tips to use in your every day.     Check out www.yourweightmatters.org blog for continued daily support and education along this weight loss journey!    Patient Resources:     Personal Training/Fitness Classes/Health Coaching     [facility] @ [email or web address] Full fitness center with group fitness and personal training. Discount available as client of [facility].  Health Coaching and Personal Training with Nikki Diallo at our [facility]- individual weekly coaching with option to add personal training and small group fitness classes targeted at weight loss- [phone] and/or email @ [email or web address]  360FIT [place] [email or web address].[email or web address]. Group Fitness [phone] and/or email [place] at [email or web address]  [facility] Fitness Centers with multiple locations: [place]alo (www.Freightos), Eat The Corindus Fitness (www.dev9k.Glowbiotics), Fit Body Bootcamp (www.MicroVisionbodyboDigital Luxuryp.Glowbiotics), Root Metrics (www.Tello.Glowbiotics), The Exercise  (www.exercisecoach.Glowbiotics)     Online Fitness  Fitness  on Utube  Fit in 10 DVD series- www.ivxis37AUY.com  Sit and Be Fit - Chair exercise series Www.sitandbefit.org  Hip  Hop Fit with Alonzo Thrasher at www.hiphopfit.net     Apps for on the Go Fitness  [place] 7 Minute Workout (orange box with white 7) - free on the go HIIT training leif  Peloton Leif @ www.onepeloton.com     Nutrition Trackers and Tools  LoseIT! And My Fitness Pal apps and on line for tracking nutrition  NOOM - virtual health coaching  FitFoundation (healthy meals on the go) in [place] @ www.bmmeujlpxdxbg0n.Qoiza  Bistro MD @ Thar Geothermal.bistromd.com and Ucylfb04 (keto and low carb plans recommended) @ www.zuisgr40.com, Metabolic Meals @ www.MyMetabolicMeals.com - individual prepared meals to go  Gobble, Blue Apron, Home , Every Plate, Sunbasket- on line meal delivery programs for preparation at home  Meal Village in [place] for homemade meals to go @ www.mealvillage.Qoiza  Diet Doctor @ www.dietdoctor.Qoiza - low carb swaps  [employer] - meal prep and planning leif (www.yummly.com)     Stress Management/Behavior/Mindful Eating  CALM meditation leif (www.calm.com)  Headspace  Am I Hungry? Mindful eating virtual  leif  Www.yourweightmatters.org - Obesity Action Coalition sponsored Blog posts daily  Motivation leif (black box with white \")- daily supportive messages sent to your phone     Books/Video Education/Podcasts  Mindless Eating by Galo Wilkinson  Why We Get Sick by Librado Lemons (a book about insulin resistance)  Atomic Habits by Khari Chang (a book about taking small steps to promote greater behavior change)   Can't Hurt Me by Davi Perry (a book exploring the power of discipline in achieving your goals)  The End of Dieting: How to Live for Life by Dr. Eliel Hussein M.D. or listen to The [employer] Podcast Episode 63: Understanding \"Nutritarian\" Eating w/Dr. Eliel Hussein  Your Body in Balance: The New Science of Food, Hormones, and Health by Dr. Mckay Plascencia  The Menopause Diet Plan by Kirsty Nichols and Elyse Cobos  The Complete Guide to fasting by Dr. Omalley  Sugar, Salt & Fat by Mary Harrison, Ph.D, R.D.  Weight  Loss Surgery Will Not Treat Food Addiction by Cheryl Larsen Ph.D  The Game Changers- [employer]Side [employer]ix Documentary on plant based nutrition  Fed Up - documentary about obesity (Free on Utube)  The Truth About Sugar - documentary on sugar (Free on Utube, https://youPingStampu.be/0H5kscsXQ3i)  The Dr. Baird T5 Wellness Plan by Dr. Lex Baird MD  Fitlosophy Fitspiration - journal to better health (found at [employer] in fitness aisle)  What Happened to You?- a look at the impact trauma has on behavior written by Bharti Washington and Dr. Norris Patel  Whole Again by Gab Stewart - discovering your true self after trauma  Ishmael Lanza talk on [employer], The Call to Courage  Podcasts: The Exam Room by the Physician's Committee, Nutrition Facts by Dr. Claros    We are here to support you with weight loss, but please remember that you still need your primary care provider for your routine health maintenance.

## 2024-02-05 NOTE — TELEPHONE ENCOUNTER
Requesting   Name from pharmacy: LOSARTAN POTASSIUM 100 MG TAB          Will file in chart as: LOSARTAN 100 MG Oral Tab    Sig: TAKE 1 TABLET BY MOUTH EVERY DAY    Disp: 90 tablet    Refills: 3    Start: 2/2/2024    Class: Normal    Non-formulary    Last ordered: 1 year ago (7/28/2022) by Dai Slaughter MD    Last refill: 8/18/2023    Rx #: [id number]    Hypertension Medications Protocol [id number]/02/2024 12:21 AM   Protocol Details CMP or BMP in past 12 months    Last serum creatinine< 2.0    Appointment in past 6 or next 3 months        LOV: 1/4/2024  RTC: none noted   Last Relevant Labs: 1/4/2024  Filled: 8/18/2023 #90 with 0 refills    Future Appointments   Date Time Provider Department Center   2/5/2024 11:40 AM Margy Bhatia APRN [facility] [address]   2/7/2024 10:00 AM Kyle De Anda DO [id number]

## 2024-02-07 ENCOUNTER — OFFICE VISIT (OUTPATIENT)
Dept: PHYSICAL MEDICINE AND REHAB | Facility: CLINIC | Age: 49
End: 2024-02-07
Payer: COMMERCIAL

## 2024-02-07 VITALS — BODY MASS INDEX: 33 KG/M2 | OXYGEN SATURATION: 98 % | HEART RATE: 90 BPM | WEIGHT: 195 LBS

## 2024-02-07 DIAGNOSIS — M54.41 CHRONIC BILATERAL LOW BACK PAIN WITH BILATERAL SCIATICA: Primary | ICD-10-CM

## 2024-02-07 DIAGNOSIS — G89.29 CHRONIC BILATERAL LOW BACK PAIN WITH BILATERAL SCIATICA: Primary | ICD-10-CM

## 2024-02-07 DIAGNOSIS — M54.42 CHRONIC BILATERAL LOW BACK PAIN WITH BILATERAL SCIATICA: Primary | ICD-10-CM

## 2024-02-07 PROCEDURE — 99204 OFFICE O/P NEW MOD 45 MIN: CPT | Performed by: PHYSICAL MEDICINE & REHABILITATION

## 2024-02-07 RX ORDER — CYCLOBENZAPRINE HCL 10 MG
10 TABLET ORAL NIGHTLY PRN
Qty: 30 TABLET | Refills: 0 | Status: SHIPPED | OUTPATIENT
Start: 2024-02-07

## 2024-02-07 RX ORDER — NORTRIPTYLINE HYDROCHLORIDE 10 MG/1
10 CAPSULE ORAL DAILY
Qty: 30 CAPSULE | Refills: 0 | Status: SHIPPED | OUTPATIENT
Start: 2024-02-07

## 2024-02-07 NOTE — H&P
History and Physical    C/C:   Chief Complaint   Patient presents with    New Patient     New R handed patient is here for b/l low back pain. Denies t/n. She's not sure how long she's had the pain since it is intermittent. Pain in low back radiates to b/l hips, b/l anterior thighs, around b/l knees, and posterior lower legs. Denies t/n or burning. States she has done PT for b/l hips and states no improvement. Hip XR on file 08/07/23. No ESIs. Pain 6/10. Takes tylenol arthritis prn. Mobic prn.      HPI: 48 year old female presents with bilateral leg pain. She has a history of right knee surgery, and bilateral knee pain first, then bilateral hip and back pain afterwards. Right knee pain was improved after surgery, but still has pain in the legs.     Has had episodes of \"sciatic\" pain. Previously treated for this at [facility], underwent physical therapy without benefit. Has lower back pain with prolonged walking or sitting. Does a fair amount of stretching. Pain seems to be worse in the left hip, though she does have lower back pain across the waistline. Sleeps on a heating pad. Has mild tingling intermittently. Ill defined current sensation that seems to start in the legs and goes throughout the body. Has associated cramping in both calves. Referred by orthopedics; was having bilateral knee pain that has not improved at all with steroid injections.     Allergies:   Allergies   Allergen Reactions    Penicillins HIVES    Dust Mite Extract     Pollen Extract OTHER (SEE COMMENTS)    Amoxicillin-Pot Clavulanate ITCHING     rash    Seasonal ITCHING      Patient-reported ROS  Constitutional  Sleep Disturbance: admits 2/2 back and leg pain. Takes benedryl at night time.   Chills: denies  Fever: denies  Weight Gain: denies  Weight Loss: denies   Cardiovascular  Chest Pain: denies  Irregular Heartbeat: denies   Respiratory  Painful Breathing: denies  Wheezing: denies   Gastrointestinal  Bowel Incontinence:  denies  Heartburn: denies  Abdominal Pain: denies  Blood in Stool : denies  Rectal Pain: denies   Hematology  Easy Bruising: denies  Easy Bleeding: denies   Genitourinary  Difficulty Urinating: denies  Bladder Incontinence: denies  Pelvic Pain: denies  Painful Urination: denies   Musculoskeletal  Joint Stiffness: admits  Painful Joints: admits  Tailbone Pain: denies  Swollen Joints: admits   Peripheral Vascular  Swelling of Legs/Feet: admits  Cold Extremities: denies   Skin  Open Sores: denies  Nodules or Lumps: denies  Rash: denies   Neurological  Loss of Strength Since last Visit: denies  Tingling/Numbness: admits  Balance: denies   Psychiatric  Anxiety: denies  Depressed Mood: denies      Physical exam:  Pulse 90   Wt 195 lb (88.5 kg)   LMP 09/20/2010   SpO2 98%   BMI 33.47 kg/m²     Lumbar spine exam:    No skin rash lumbar/upper sacral region  + pain with lumbar flexion. + pain with lumbar extension. Equally painful.  5/5 LE strength b/l in HF, KE, ADF, EHL, ankle eversion  SILT b/l LE  2/4 quad, gastrocs reflexes b/l  Facet loading + b/l  SLR negative b/l    Imaging: No new imaging to review    Assessment and plan:  1) chronic bilateral lower back with questionable sciatica    Description of symptoms is not classic but might be radicular in nature. Given the chronicity of symptoms despite physical therapy and gabapentin in the past I recommend an MRI of the lumbar spine for further evaluation. I also recommend she start nortritpyline 10mg qDaily, flexeril 10mg at bedtime PRN muscle tightness/spasms.    Follow up after MRI lumbar spine.     Kyle De Anda DO  Physical Medicine and Rehabilitation  [facility]

## 2024-02-18 DIAGNOSIS — M25.561 BILATERAL CHRONIC KNEE PAIN: ICD-10-CM

## 2024-02-18 DIAGNOSIS — M25.562 BILATERAL CHRONIC KNEE PAIN: ICD-10-CM

## 2024-02-18 DIAGNOSIS — G89.29 BILATERAL CHRONIC KNEE PAIN: ICD-10-CM

## 2024-02-19 RX ORDER — MELOXICAM 15 MG/1
15 TABLET ORAL
Qty: 30 TABLET | Refills: 0 | Status: SHIPPED | OUTPATIENT
Start: 2024-02-19

## 2024-02-19 NOTE — TELEPHONE ENCOUNTER
Meloxicam 15 mg  DOS: N/A  Last OV: 01/29/24  Last refill date: 01/17/24     #/refills: 30/0  Upcoming appt:   Future Appointments   Date Time Provider Department Center   2/28/2024  5:00 PM  MR RM3 (3T WIDE)  MRI [facility]   5/29/2024 11:20 AM Margy Bhatia APRN [facility]       01/04/24  BUN  9 - 23 mg/dL 15   Creatinine  0.55 - 1.02 mg/dL 0.91           eGFR-Cr  >=60 mL/min/1.73m2 77

## 2024-02-25 ENCOUNTER — HOSPITAL ENCOUNTER (OUTPATIENT)
Dept: MRI IMAGING | Facility: HOSPITAL | Age: 49
Discharge: HOME | End: 2024-02-25
Attending: PHYSICAL MEDICINE & REHABILITATION
Payer: COMMERCIAL

## 2024-02-25 ENCOUNTER — HOSPITAL ENCOUNTER (OUTPATIENT)
Dept: MRI IMAGING | Facility: HOSPITAL | Age: 49
End: 2024-02-25
Attending: PHYSICAL MEDICINE & REHABILITATION
Payer: COMMERCIAL

## 2024-02-25 DIAGNOSIS — G89.29 CHRONIC BILATERAL LOW BACK PAIN WITH BILATERAL SCIATICA: ICD-10-CM

## 2024-02-25 DIAGNOSIS — M54.42 CHRONIC BILATERAL LOW BACK PAIN WITH BILATERAL SCIATICA: ICD-10-CM

## 2024-02-25 DIAGNOSIS — M54.41 CHRONIC BILATERAL LOW BACK PAIN WITH BILATERAL SCIATICA: ICD-10-CM

## 2024-02-25 PROCEDURE — 72148 MRI LUMBAR SPINE W/O DYE: CPT | Performed by: PHYSICAL MEDICINE & REHABILITATION

## 2024-03-05 ENCOUNTER — TELEPHONE (OUTPATIENT)
Dept: PHYSICAL MEDICINE AND REHAB | Facility: CLINIC | Age: 49
End: 2024-03-05

## 2024-03-05 DIAGNOSIS — M54.41 CHRONIC BILATERAL LOW BACK PAIN WITH BILATERAL SCIATICA: ICD-10-CM

## 2024-03-05 DIAGNOSIS — G89.29 CHRONIC BILATERAL LOW BACK PAIN WITH BILATERAL SCIATICA: ICD-10-CM

## 2024-03-05 DIAGNOSIS — M54.42 CHRONIC BILATERAL LOW BACK PAIN WITH BILATERAL SCIATICA: ICD-10-CM

## 2024-03-06 RX ORDER — NORTRIPTYLINE HYDROCHLORIDE 10 MG/1
10 CAPSULE ORAL DAILY
Qty: 90 CAPSULE | Refills: 0 | Status: SHIPPED | OUTPATIENT
Start: 2024-03-06 | End: 2024-03-08

## 2024-03-06 NOTE — TELEPHONE ENCOUNTER
Refill Request    Medication request: nortriptyline 10 MG Oral Cap. Take 1 capsule (10 mg total) by mouth daily.     LAST GFR: 01/04/2024, 77. PROTOCOL PASSED.    LOV:2/7/2024 Kyle De Anda DO   Due back to clinic per last office note: Per Dr. De Anda: \"Follow up after MRI lumbar spine.\"  NOV: 3/8/2024 Kyle De Anda DO      [place]: 02/07/2024 #30    Urine drug screen (if applicable): n/a  Pain contract: n/a    LOV plan (if weaning or changing medications): Per Dr. De Anda: \"I also recommend she start nortritpyline 10mg qDaily.\"    Pharmacy requesting 90 day supply. Order changed to reflec this request, however, routed to Dr. De Anda as this is a change from original RX.

## 2024-03-08 ENCOUNTER — OFFICE VISIT (OUTPATIENT)
Dept: PHYSICAL MEDICINE AND REHAB | Facility: CLINIC | Age: 49
End: 2024-03-08
Payer: COMMERCIAL

## 2024-03-08 VITALS — BODY MASS INDEX: 33.29 KG/M2 | HEIGHT: 64 IN | WEIGHT: 195 LBS

## 2024-03-08 DIAGNOSIS — M54.41 CHRONIC BILATERAL LOW BACK PAIN WITH BILATERAL SCIATICA: ICD-10-CM

## 2024-03-08 DIAGNOSIS — G89.29 CHRONIC BILATERAL LOW BACK PAIN WITH BILATERAL SCIATICA: ICD-10-CM

## 2024-03-08 DIAGNOSIS — M54.42 CHRONIC BILATERAL LOW BACK PAIN WITH BILATERAL SCIATICA: ICD-10-CM

## 2024-03-08 DIAGNOSIS — M48.062 LUMBAR STENOSIS WITH NEUROGENIC CLAUDICATION: Primary | ICD-10-CM

## 2024-03-08 PROCEDURE — 3008F BODY MASS INDEX DOCD: CPT | Performed by: PHYSICAL MEDICINE & REHABILITATION

## 2024-03-08 PROCEDURE — 99214 OFFICE O/P EST MOD 30 MIN: CPT | Performed by: PHYSICAL MEDICINE & REHABILITATION

## 2024-03-08 RX ORDER — CYCLOBENZAPRINE HCL 5 MG
5 TABLET ORAL NIGHTLY PRN
Qty: 30 TABLET | Refills: 0 | Status: SHIPPED | OUTPATIENT
Start: 2024-03-08

## 2024-03-08 RX ORDER — PREGABALIN 25 MG/1
CAPSULE ORAL
Qty: 60 CAPSULE | Refills: 0 | Status: SHIPPED | OUTPATIENT
Start: 2024-03-08

## 2024-03-08 NOTE — PATIENT INSTRUCTIONS
If the medication is not work we can consider epidural steroid injections on both sides of your back. If you are satisfied with the discussion we had about it you may request the procedure through [employer]; if you have further questions please follow up for further discussion.

## 2024-03-08 NOTE — PROGRESS NOTES
Progress note    C/C:   Chief Complaint   Patient presents with    Follow - Up     LOV: 2/7/2024 pt comes in to f/u on MRI of L-spine. Presents with bilateral low back, bilateral hip and leg aching pain, L>R. Intermittent T/N in L leg. Admits some weakness. Rates pain 8/10. Tylenol PRN, Meloxicam for R knee pain. Stopped nortriptyline and flexeril due to side effects.       HPI: 48 year old female presents for follow up. She continues to have throbbing, aching pain in both legs with standing and walking along with bilateral knee pain. There is numbness and tingling intermittently in the legs and feet.  Overall she has some difficulty describing symptoms.  Prior to seeing Dr. Torres she underwent physical therapy through the [address] [place] [address], and then again in [place].  Unclear if a significant amount of treatment was done directed towards the lumbar spine.    Took nortriptyline, but it increased her appetite and caused her to gain weight, so she stopped the medication.  She was also previously on gabapentin which made her nauseous.    Pertinent allergies:   Allergies   Allergen Reactions    Penicillins HIVES    Dust Mite Extract     Pollen Extract OTHER (SEE COMMENTS)    Amoxicillin-Pot Clavulanate ITCHING     rash    Seasonal ITCHING        Physical exam:  Ht 64\"   Wt 195 lb (88.5 kg)   LMP 09/20/2010   BMI 33.47 kg/m²      Lumbar spine exam:    No skin rash lumbar/upper sacral region  mild pain with lumbar flexion. + pain with lumbar extension.  5/5 LE strength b/l  SILT b/l LE  2/4 quad, gastrocs reflexes b/l  SLR + bilaterally, for ipsilateral leg pain    Imaging: MRI lumbar spine dated 2/25/2024 independently reviewed, as was reports.  Mild to moderate central stenosis at L4-L5.    Assessment and plan  Lumbar stenosis with neurogenic claudication    She has difficulty describing the symptoms, though it is possible that the leg pain, numbness and tingling is coming from the mild-moderate lumbar stenosis. We  discussed treatment options, which includes physical therapy for neutral to flexion lumbar stabilization, neuropathic medications to reduce the leg symptoms and make it easier to tolerate standing and walking, and finally bilateral L5 TFESI under fluoroscopy. There is still a role for physical therapy, though she has done two rounds and we will hold off on PT for the time being. She will start pregabalin 25mg at bedtime x1 week, then BID afterwards if no side effects and no benefit along with continuing exercise. The other option is duloxetine, but that would expected to become a longer term medication.     If no benefit from the medication consider bilateral L5 TFESI under fluoroscopy. We discussed the risks of procedure, including bleeding, infection, nerve injury, HA. She will let me know how she is doing in 4 weeks, and if no better can get her set up for NIKKI unless she has further questions and does not want to do a trial of duloxetine.     Kyle De Anda DO  Physical Medicine and Rehabilitation  [facility]

## 2024-03-18 DIAGNOSIS — G89.29 BILATERAL CHRONIC KNEE PAIN: ICD-10-CM

## 2024-03-18 DIAGNOSIS — M25.562 BILATERAL CHRONIC KNEE PAIN: ICD-10-CM

## 2024-03-18 DIAGNOSIS — M25.561 BILATERAL CHRONIC KNEE PAIN: ICD-10-CM

## 2024-03-18 RX ORDER — MELOXICAM 15 MG/1
15 TABLET ORAL
Qty: 30 TABLET | Refills: 0 | Status: SHIPPED | OUTPATIENT
Start: 2024-03-18

## 2024-03-18 NOTE — TELEPHONE ENCOUNTER
Meloxicam 15 mg  DOS: N/A  Last OV: 01/29/24  Last refill date: 02/19/24     #/refills: 30/0  Upcoming appt:   Future Appointments   Date Time Provider Department Center   5/29/2024 11:20 AM Margy Bhatia APRN [facility]     01/04/24  BUN  9 - 23 mg/dL 15   Creatinine  0.55 - 1.02 mg/dL 0.91           eGFR-Cr  >=60 mL/min/1.73m2 77

## 2024-03-31 DIAGNOSIS — I10 ESSENTIAL HYPERTENSION, BENIGN: ICD-10-CM

## 2024-04-01 RX ORDER — METOPROLOL SUCCINATE 25 MG/1
12.5 TABLET, EXTENDED RELEASE ORAL DAILY
Qty: 45 TABLET | Refills: 3 | Status: SHIPPED | OUTPATIENT
Start: 2024-04-01

## 2024-04-01 NOTE — TELEPHONE ENCOUNTER
Requesting    Name from pharmacy: METOPROLOL SUCC ER 25 MG TAB         Will file in chart as: METOPROLOL SUCCINATE ER 25 MG Oral Tablet 24 Hr    Sig: TAKE 1/2 TABLET BY MOUTH EVERY DAY    Disp: 45 tablet    Refills: 0 (Pharmacy requested: Not specified)    Start: 3/31/2024    Class: Normal    Non-formulary For: Essential hypertension, benign    Last ordered: 2 months ago (1/4/2024) by Dai Slaughter MD    Last refill: 1/4/2024    Rx #: [id number]    Hypertension Medications Protocol Etwgrp0703/31/2024 07:14 AM   Protocol Details CMP or BMP in past 12 months    Last BP reading less than 140/90    In person appointment or virtual visit in the past 12 mos or appointment in next 3 mos    EGFRCR or GFRAA > 50      To be filled at: [facility]/pharmacy #[id number] [address] AT [facility], [phone], [phone]        LOV: 01/04/24 w/ DV  RTC:  No Follow Up on File   Last Relevant Labs: 01/04/24   Filled: 01/04/24  #45 with 0 refills    Future Appointments   Date Time Provider Department Center   4/20/2024 10:40 AM [place] [address] [facility] [place]   5/29/2024 11:20 AM Margy Bhatia APRN [facility]

## 2024-04-16 DIAGNOSIS — Z91.09 ENVIRONMENTAL ALLERGIES: ICD-10-CM

## 2024-04-16 RX ORDER — CETIRIZINE HYDROCHLORIDE 10 MG/1
10 TABLET ORAL DAILY
Qty: 90 TABLET | Refills: 1 | Status: SHIPPED | OUTPATIENT
Start: 2024-04-16

## 2024-04-16 NOTE — TELEPHONE ENCOUNTER
Requesting    Name from pharmacy: CETIRIZINE HCL 10 MG TABLET         Will file in chart as: CETIRIZINE 10 MG Oral Tab    Sig: TAKE 1 TABLET BY MOUTH EVERY DAY    Disp: 90 tablet    Refills: 1    Start: 4/16/2024    Class: Normal    Non-formulary For: Environmental allergies    Last ordered: 8 months ago (8/4/2023) by Dai Slaughter MD    Last refill: 1/17/2024    Rx #: [id number]    Allergy Medication Protocol Clhydf5404/16/2024 12:51 AM   Protocol Details In person appointment or virtual visit in the past 12 mos or appointment in next 3 mos      To be filled at: [facility]/pharm[facility][id number] [place] [address] [facility], [phone], [phone]        LOV: 01/04/24 w/ DV   RTC: No Follow up on File   Last Relevant Labs: 01/04/24   Filled: 08/04/23  #90 with 1 refills    Future Appointments   Date Time Provider Department Center   4/20/2024 10:40 AM [facility] [place]   5/29/2024 11:20 AM Margy Bhatia APRN [facility]

## 2024-04-20 ENCOUNTER — HOSPITAL ENCOUNTER (OUTPATIENT)
Dept: MAMMOGRAPHY | Age: 49
Discharge: HOME | End: 2024-04-20
Attending: INTERNAL MEDICINE
Payer: COMMERCIAL

## 2024-04-20 DIAGNOSIS — Z12.31 ENCOUNTER FOR SCREENING MAMMOGRAM FOR MALIGNANT NEOPLASM OF BREAST: ICD-10-CM

## 2024-04-20 PROCEDURE — 77063 BREAST TOMOSYNTHESIS BI: CPT | Performed by: INTERNAL MEDICINE

## 2024-04-20 PROCEDURE — 77067 SCR MAMMO BI INCL CAD: CPT | Performed by: INTERNAL MEDICINE

## 2024-04-24 DIAGNOSIS — G89.29 BILATERAL CHRONIC KNEE PAIN: ICD-10-CM

## 2024-04-24 DIAGNOSIS — M25.562 BILATERAL CHRONIC KNEE PAIN: ICD-10-CM

## 2024-04-24 DIAGNOSIS — M25.561 BILATERAL CHRONIC KNEE PAIN: ICD-10-CM

## 2024-04-24 RX ORDER — MELOXICAM 15 MG/1
15 TABLET ORAL
Qty: 30 TABLET | Refills: 0 | Status: SHIPPED | OUTPATIENT
Start: 2024-04-24

## 2024-05-03 DIAGNOSIS — E66.9 OBESITY (BMI 30-39.9): ICD-10-CM

## 2024-05-03 DIAGNOSIS — Z51.81 THERAPEUTIC DRUG MONITORING: ICD-10-CM

## 2024-05-06 DIAGNOSIS — M54.41 CHRONIC BILATERAL LOW BACK PAIN WITH BILATERAL SCIATICA: ICD-10-CM

## 2024-05-06 DIAGNOSIS — M54.42 CHRONIC BILATERAL LOW BACK PAIN WITH BILATERAL SCIATICA: ICD-10-CM

## 2024-05-06 DIAGNOSIS — G89.29 CHRONIC BILATERAL LOW BACK PAIN WITH BILATERAL SCIATICA: ICD-10-CM

## 2024-05-06 RX ORDER — LOSARTAN POTASSIUM 100 MG/1
100 TABLET ORAL DAILY
Qty: 90 TABLET | Refills: 3 | Status: SHIPPED | OUTPATIENT
Start: 2024-05-06

## 2024-05-06 RX ORDER — SEMAGLUTIDE 2.4 MG/.75ML
2.4 INJECTION, SOLUTION SUBCUTANEOUS WEEKLY
Qty: 9 ML | Refills: 0 | Status: SHIPPED | OUTPATIENT
Start: 2024-05-06

## 2024-05-06 NOTE — TELEPHONE ENCOUNTER
Requesting    Name from pharmacy: LOSARTAN POTASSIUM 100 MG TAB         Will file in chart as: LOSARTAN 100 MG Oral Tab    Sig: TAKE 1 TABLET BY MOUTH EVERY DAY    Disp: 90 tablet    Refills: 0 (Pharmacy requested: Not specified)    Start: 5/4/2024    Class: Normal    Last ordered: 3 months ago (2/5/2024) by Dai Slaughter MD    Last refill: 2/5/2024    Rx #: [id number]    Hypertension Medications Protocol Ashnai7905/04/2024 09:21 AM   Protocol Details CMP or BMP in past 12 months    Last BP reading less than 140/90    In person appointment or virtual visit in the past 12 mos or appointment in next 3 mos    EGFRCR or GFRAA > 50      To be filled at: [id number] [place], [place] [facility] [address] [facility] [facility], [phone], [phone]     LOV: 01/04/24 w/ DV  RTC: No Follow Up on File   Last Relevant Labs: 01/2024  Filled: 02/05/24 #90 with 0 refills    Future Appointments   Date Time Provider Department Center   5/10/2024  1:40 PM PF [facility] RM2 PF [facility] [place]   7/25/2024  1:40 PM Margy Bhatia APRN [facility] [id number]

## 2024-05-06 NOTE — TELEPHONE ENCOUNTER
Requesting   Requested Prescriptions     Pending Prescriptions Disp Refills    semaglutide-weight management (WEGOVY) 2.4 MG/0.75ML Subcutaneous Solution Auto-injector 9 mL 0     Sig: Inject 0.75 mL (2.4 mg total) into the skin once a week.       LOV: 02/05/2024  RTC: in about 3 months  Filled: 02/05/2024 #9ml with 0 refills    Future Appointments   Date Time Provider Department [place]   5/10/2024  1:40 PM [facility] [place]   7/25/2024  1:40 PM Margy Bhatia APRN [facility] [id number]     Patient Comment: I’m all out off my shots need a refill.

## 2024-05-07 NOTE — TELEPHONE ENCOUNTER
Refill Request    Medication request: cyclobenzaprine 10 MG Oral Tab.Take 1 tablet (10 mg total) by mouth nightly as needed for Muscle spasms.      LOV:3/8/2024 Kyle De Anda DO   Due back to clinic per last office note:  4 week follow up  NOV: Visit date not found      [place]: 3/8/2024 #30 (30 days)    Urine drug screen (if applicable): N/A  Pain contract: N/A    LOV plan (if weaning or changing medications): not mentioned. However ordered on 3/8/24.

## 2024-05-09 RX ORDER — CYCLOBENZAPRINE HCL 10 MG
10 TABLET ORAL NIGHTLY PRN
Qty: 30 TABLET | Refills: 0 | Status: SHIPPED | OUTPATIENT
Start: 2024-05-09

## 2024-05-10 ENCOUNTER — HOSPITAL ENCOUNTER (OUTPATIENT)
Dept: MAMMOGRAPHY | Age: 49
Discharge: HOME | End: 2024-05-10
Attending: INTERNAL MEDICINE

## 2024-05-10 ENCOUNTER — HOSPITAL ENCOUNTER (OUTPATIENT)
Dept: ULTRASOUND IMAGING | Age: 49
Discharge: HOME | End: 2024-05-10
Attending: INTERNAL MEDICINE

## 2024-05-10 DIAGNOSIS — R92.2 INCONCLUSIVE MAMMOGRAM: ICD-10-CM

## 2024-05-10 PROCEDURE — 76642 ULTRASOUND BREAST LIMITED: CPT | Performed by: INTERNAL MEDICINE

## 2024-05-10 PROCEDURE — 77061 BREAST TOMOSYNTHESIS UNI: CPT | Performed by: INTERNAL MEDICINE

## 2024-05-10 PROCEDURE — 77065 DX MAMMO INCL CAD UNI: CPT | Performed by: INTERNAL MEDICINE

## 2024-05-23 DIAGNOSIS — G89.29 BILATERAL CHRONIC KNEE PAIN: ICD-10-CM

## 2024-05-23 DIAGNOSIS — M25.561 BILATERAL CHRONIC KNEE PAIN: ICD-10-CM

## 2024-05-23 DIAGNOSIS — M25.562 BILATERAL CHRONIC KNEE PAIN: ICD-10-CM

## 2024-05-23 RX ORDER — MELOXICAM 15 MG/1
15 TABLET ORAL
Qty: 30 TABLET | Refills: 0 | Status: SHIPPED | OUTPATIENT
Start: 2024-05-23

## 2024-05-23 NOTE — TELEPHONE ENCOUNTER
Meloxicam    DOS: n/a  Last OV: 1/29/24  Last refill date: 4/24/24 #/refills: 30/0  Upcoming appt: None       Component      Latest Ref Rng 1/4/2024   Glucose      70 - 99 mg/dL 75    Sodium      136 - 145 mmol/L 141    Potassium      3.5 - 5.1 mmol/L 4.1    Chloride      98 - 112 mmol/L 108    Carbon Dioxide, Total      21.0 - 32.0 mmol/L 30.0    ANION GAP      0 - 18 mmol/L 3    BUN      9 - 23 mg/dL 15    CREATININE      0.55 - 1.02 mg/dL 0.91    CALCIUM      8.5 - 10.1 mg/dL 9.2    CALCULATED OSMOLALITY      275 - 295 mOsm/kg 292    EGFR      >=60 mL/min/1.73m2 77    AST (SGOT)      15 - 37 U/L 6 (L)    ALT (SGPT)      13 - 56 U/L 15    ALKALINE PHOSPHATASE      39 - 100 U/L 61    Total Bilirubin      0.1 - 2.0 mg/dL 0.3    PROTEIN, TOTAL      6.4 - 8.2 g/dL 7.1    Albumin      3.4 - 5.0 g/dL 3.9    Globulin      2.8 - 4.4 g/dL 3.2    A/G Ratio      1.0 - 2.0  1.2    Patient Fasting for CMP? No       Legend:  (L) Low

## 2024-06-03 DIAGNOSIS — M54.42 CHRONIC BILATERAL LOW BACK PAIN WITH BILATERAL SCIATICA: ICD-10-CM

## 2024-06-03 DIAGNOSIS — G89.29 CHRONIC BILATERAL LOW BACK PAIN WITH BILATERAL SCIATICA: ICD-10-CM

## 2024-06-03 DIAGNOSIS — M54.41 CHRONIC BILATERAL LOW BACK PAIN WITH BILATERAL SCIATICA: ICD-10-CM

## 2024-06-05 NOTE — TELEPHONE ENCOUNTER
Refill Request    Medication request: nortriptyline 10 MG Oral Cap  Take 1 capsule (10 mg total) by mouth daily.     LOV:3/8/2024 Kyle De Anda, DO   Due back to clinic per last office note:  \"She will let me know how she is doing in 4 weeks \"  NOV: Visit date not found      [place]: 3/06/2024 #90    Urine drug screen (if applicable): N/A  Pain contract: N/A    LOV plan (if weaning or changing medications): Per Dr. De Anda's LOV notes: \"She will start pregabalin 25mg at bedtime x1 week, then BID afterwards if no side effects and no benefit along with continuing exercise. The other option is duloxetine, but that would expected to become a longer term medication. \"    (No mention of nortriptyline )

## 2024-06-06 RX ORDER — NORTRIPTYLINE HYDROCHLORIDE 10 MG/1
10 CAPSULE ORAL DAILY
Qty: 90 CAPSULE | Refills: 0 | Status: SHIPPED | OUTPATIENT
Start: 2024-06-06

## 2024-06-22 ENCOUNTER — PATIENT MESSAGE (OUTPATIENT)
Dept: INTERNAL MEDICINE CLINIC | Facility: CLINIC | Age: 49
End: 2024-06-22

## 2024-06-24 ENCOUNTER — HOSPITAL ENCOUNTER (OUTPATIENT)
Age: 49
Discharge: HOME | End: 2024-06-24

## 2024-06-24 VITALS
HEART RATE: 103 BPM | BODY MASS INDEX: 33.39 KG/M2 | TEMPERATURE: 98 F | WEIGHT: 198 LBS | DIASTOLIC BLOOD PRESSURE: 84 MMHG | OXYGEN SATURATION: 96 % | RESPIRATION RATE: 20 BRPM | HEIGHT: 64.5 IN | SYSTOLIC BLOOD PRESSURE: 134 MMHG

## 2024-06-24 DIAGNOSIS — J01.41 ACUTE RECURRENT PANSINUSITIS: Primary | ICD-10-CM

## 2024-06-24 DIAGNOSIS — H65.93 BILATERAL OTITIS MEDIA WITH EFFUSION: ICD-10-CM

## 2024-06-24 PROCEDURE — 99214 OFFICE O/P EST MOD 30 MIN: CPT

## 2024-06-24 PROCEDURE — 99213 OFFICE O/P EST LOW 20 MIN: CPT

## 2024-06-24 RX ORDER — ACETAMINOPHEN 500 MG
1000 TABLET ORAL ONCE
Status: COMPLETED | OUTPATIENT
Start: 2024-06-24 | End: 2024-06-24

## 2024-06-24 RX ORDER — PREDNISONE 20 MG/1
40 TABLET ORAL DAILY
Qty: 6 TABLET | Refills: 0 | Status: SHIPPED | OUTPATIENT
Start: 2024-06-24 | End: 2024-06-27

## 2024-06-24 RX ORDER — DEXAMETHASONE 4 MG/1
4 TABLET ORAL ONCE
Status: COMPLETED | OUTPATIENT
Start: 2024-06-24 | End: 2024-06-24

## 2024-06-24 RX ORDER — CEFDINIR 300 MG/1
300 CAPSULE ORAL 2 TIMES DAILY
Qty: 20 CAPSULE | Refills: 0 | Status: SHIPPED | OUTPATIENT
Start: 2024-06-24 | End: 2024-07-04

## 2024-06-24 NOTE — ED PROVIDER NOTES
Patient Seen in: [facility]      History     Chief Complaint   Patient presents with    Sinus Problem     Stated Complaint: sinus    Subjective:   HPI    49 yo female here with complaints of ear pain as well as sinus pain and pressure with purulent drainage.  Patient reports that she gets frequent sinus infections.  Patient denies chest pain, shortness of breath, cough, abdominal pain, nausea, vomiting or diarrhea.  Patient is tolerating p.o. speaking full sentences.  Afebrile.    Objective:   Past Medical History:    Acute pancreatitis (HCC)    Allergic rhinitis    Arthritis    Asthma (HCC)    Bloating    Constipation    Decorative tattoo    Don't remember when    Diabetes (HCC)    Esophageal reflux    Essential hypertension    Flatulence/gas pain/belching    Unknown    Food intolerance    Milk give gas    Frequent use of laxatives    Heartburn    Acid reflux    Hemangioma    Hemorrhoids    Sometimes    Lymphadenopathy    Migraines    Night sweats    Hot flashes    Obesity    Osteoarthritis    Pain with bowel movements    Due to hemorrhoids    Sleep disturbance    Due to night sweats    Wears glasses            The patient's medication list, past medical history and social history elements  as listed in today's nurse's notes are reviewed and agree.   The patient's family history is reviewed and is noncontributory to the presenting problem, except as indicated as above.     Past Surgical History:   Procedure Laterality Date    Carpal tunnel release Bilateral     Foot surgery Right 2022    Excision soft tissue hemangioma right foot    Hysterectomy      age 36    Knee surgery Right     meniscus injury       1991, , 2009    Oophorectomy      age 36    Other surgical history  , , 2019    Hand surgery on both hands and knee surgery on my right knee    Total abdom hysterectomy                  Social History     Socioeconomic History    Marital status:    Tobacco Use     Smoking status: Never    Smokeless tobacco: Never   Vaping Use    Vaping status: Never Used   Substance and Sexual Activity    Alcohol use: Not Currently     Alcohol/week: 2.0 standard drinks of alcohol     Types: 2 Glasses of wine per week     Comment: Maybe every 2-3 months    Drug use: Never   Other Topics Concern    Caffeine Concern No    Exercise No    Seat Belt No    Special Diet No    Stress Concern Yes    Weight Concern Yes              Review of Systems    Positive for stated complaint: sinus  Other systems are as noted in HPI.  Constitutional and vital signs reviewed.      All other systems reviewed and negative except as noted above.    Physical Exam     ED Triage Vitals [06/24/24 1229]   /84   Pulse 103   Resp 20   Temp 98 °F (36.7 °C)   Temp src Temporal   SpO2 96 %   O2 Device        Current Vitals:   Vital Signs  BP: 134/84  Pulse: 103  Resp: 20  Temp: 98 °F (36.7 °C)  Temp src: Temporal    Oxygen Therapy  SpO2: 96 %            Physical Exam  Vitals and nursing note reviewed.   Constitutional:       Appearance: Normal appearance. She is well-developed.   HENT:      Head: Normocephalic.      Jaw: There is normal jaw occlusion.      Right Ear: External ear normal. Tympanic membrane is injected, erythematous and bulging.      Left Ear: External ear normal. Tympanic membrane is bulging.      Nose: Mucosal edema, congestion and rhinorrhea present. Rhinorrhea is purulent.      Right Sinus: Maxillary sinus tenderness and frontal sinus tenderness present.      Left Sinus: Maxillary sinus tenderness and frontal sinus tenderness present.      Mouth/Throat:      Lips: Pink.      Mouth: Mucous membranes are moist.      Pharynx: Oropharynx is clear.      Comments: Uvula midline: No trismus or drooling: No peritonsillar abscess noted moderate cobblestoning the Posterior pharynx.  Eyes:      Conjunctiva/sclera: Conjunctivae normal.      Pupils: Pupils are equal, round, and reactive to light.    Cardiovascular:      Rate and Rhythm: Normal rate and regular rhythm.      Heart sounds: Normal heart sounds.   Pulmonary:      Effort: Pulmonary effort is normal.      Breath sounds: Normal breath sounds.   Musculoskeletal:      Cervical back: Normal range of motion and neck supple.   Skin:     General: Skin is warm.      Capillary Refill: Capillary refill takes less than 2 seconds.   Neurological:      General: No focal deficit present.      Mental Status: She is alert and oriented to person, place, and time.   Psychiatric:         Mood and Affect: Mood normal.         Behavior: Behavior normal.         Thought Content: Thought content normal.         Judgment: Judgment normal.             ED Course                      MDM   Clinical Impression: otitis media bilateral/sinusitis/PND  Course of Treatment:   The decadron will work in your system the next several days.  You may start the additional prednisone on day 2 or 3 if symptoms persist.  Take the full course of antibiotics as prescribed in tandem with a probiotic daily.  Where as antibiotics kill the bad bacteria they also kill the good gut sabra.  Some good over-the-counter brands are Culturelle, Florastor and align.  Recommend follow-up with ENT for further evaluation and treatment.    The patient is encouraged to return if any concerning symptoms arise. Additional verbal discharge instructions are given and discussed. Discharge medications are discussed. The patient is in good condition throughout the visit today and remains so upon discharge. I discuss the plan of care with the patient, who expresses understanding. All questions and concerns are addressed to the patient's satisfaction prior to discharge today.  Previous conversations with PCP and charts were reviewed.                                           Disposition and Plan     Clinical Impression:  1. Acute recurrent pansinusitis    2. Bilateral otitis media with effusion          Disposition:  Discharge  6/24/2024  1:07 pm    Follow-up:  Dai Lees MD  [address]   [address]  [facility] [id number] [phone]          Minh Velez MD  [address] [facility] [address] [phone]                Medications Prescribed:  Current Discharge Medication List        START taking these medications    Details   cefdinir 300 MG Oral Cap Take 1 capsule (300 mg total) by mouth 2 (two) times daily for 10 days.  Qty: 20 capsule, Refills: 0      predniSONE 20 MG Oral Tab Take 2 tablets (40 mg total) by mouth daily for 3 days. Start on day 2-3 if symptoms persist  Qty: 6 tablet, Refills: 0

## 2024-06-24 NOTE — DISCHARGE INSTRUCTIONS
Please return to the ER/clinic if symptoms worsen. Follow-up with your PCP in 24-48 hours as needed.    The decadron will work in your system the next several days.  You may start the additional prednisone on day 2 or 3 if symptoms persist.  Take the full course of antibiotics as prescribed in tandem with a probiotic daily.  Where as antibiotics kill the bad bacteria they also kill the good gut sabra.  Some good over-the-counter brands are Culturelle, Florastor and align.  Recommend follow-up with ENT for further evaluation and treatment.

## 2024-06-25 DIAGNOSIS — M25.562 BILATERAL CHRONIC KNEE PAIN: ICD-10-CM

## 2024-06-25 DIAGNOSIS — M25.561 BILATERAL CHRONIC KNEE PAIN: ICD-10-CM

## 2024-06-25 DIAGNOSIS — G89.29 BILATERAL CHRONIC KNEE PAIN: ICD-10-CM

## 2024-06-25 RX ORDER — MELOXICAM 15 MG/1
15 TABLET ORAL
Qty: 30 TABLET | Refills: 0 | Status: SHIPPED | OUTPATIENT
Start: 2024-06-25

## 2024-06-25 NOTE — TELEPHONE ENCOUNTER
Meloxicam 15 mg  DOS: N/A  Last OV: 01/29/24  Last refill date: 05/23/24     #/refills: 30/0  Upcoming appt:   Future Appointments   Date Time Provider Department Center   7/25/2024  1:40 PM Margy Bhatia APRN [facility] [id number]     01/4/24  BUN  9 - 23 mg/dL 15   Creatinine  0.55 - 1.02 mg/dL 0.91     eGFR-Cr  >=60 mL/min/1.73m2 77

## 2024-07-02 ENCOUNTER — HOSPITAL ENCOUNTER (EMERGENCY)
Age: 49
Discharge: HOME | End: 2024-07-03
Attending: EMERGENCY MEDICINE
Payer: COMMERCIAL

## 2024-07-02 DIAGNOSIS — J32.9 CHRONIC SINUSITIS, UNSPECIFIED LOCATION: Primary | ICD-10-CM

## 2024-07-02 LAB
POCT INFLUENZA A: NEGATIVE
POCT INFLUENZA B: NEGATIVE
SARS-COV-2 RNA RESP QL NAA+PROBE: NOT DETECTED

## 2024-07-02 PROCEDURE — 99283 EMERGENCY DEPT VISIT LOW MDM: CPT

## 2024-07-02 PROCEDURE — 87502 INFLUENZA DNA AMP PROBE: CPT | Performed by: EMERGENCY MEDICINE

## 2024-07-02 PROCEDURE — 87502 INFLUENZA DNA AMP PROBE: CPT

## 2024-07-02 RX ORDER — IBUPROFEN 600 MG/1
600 TABLET ORAL ONCE
Status: COMPLETED | OUTPATIENT
Start: 2024-07-02 | End: 2024-07-02

## 2024-07-03 VITALS
HEIGHT: 64 IN | DIASTOLIC BLOOD PRESSURE: 89 MMHG | SYSTOLIC BLOOD PRESSURE: 158 MMHG | TEMPERATURE: 97 F | WEIGHT: 195 LBS | RESPIRATION RATE: 17 BRPM | BODY MASS INDEX: 33.29 KG/M2 | HEART RATE: 80 BPM | OXYGEN SATURATION: 98 %

## 2024-07-03 DIAGNOSIS — G89.29 CHRONIC BILATERAL LOW BACK PAIN WITH BILATERAL SCIATICA: ICD-10-CM

## 2024-07-03 DIAGNOSIS — M54.41 CHRONIC BILATERAL LOW BACK PAIN WITH BILATERAL SCIATICA: ICD-10-CM

## 2024-07-03 DIAGNOSIS — M54.42 CHRONIC BILATERAL LOW BACK PAIN WITH BILATERAL SCIATICA: ICD-10-CM

## 2024-07-03 RX ORDER — PREDNISONE 20 MG/1
40 TABLET ORAL DAILY
Qty: 10 TABLET | Refills: 0 | Status: SHIPPED | OUTPATIENT
Start: 2024-07-03 | End: 2024-07-08

## 2024-07-03 NOTE — TELEPHONE ENCOUNTER
Refill Request    Medication request: CYCLOBENZAPRINE 10 MG Oral Tab. TAKE 1 TABLET BY MOUTH EVERY DAY NIGHTLY AS NEEDED FOR MUSCLE SPASMS      LOV:3/8/2024 Kyle De Anda, DO   Due back to clinic per last office note:  She will let me know how she is doing in 4 weeks, and if no better can get her set up for NIKKI unless she has further questions and does not want to do a trial of duloxetine.   NOV: Visit date not found      [place]: 05/09/2024 #30    Urine drug screen (if applicable): N/A  Pain contract: N/A    LOV plan (if weaning or changing medications): not mentioned      MCM sent for a condition update.

## 2024-07-03 NOTE — ED INITIAL ASSESSMENT (HPI)
Uri sx x1 month. Was at IC last week, prescribed antibiotics and finished course. Feeling worse. Bodyaches, earpain, facial pain. Denies sob/cough. No recent fever.

## 2024-07-03 NOTE — ED PROVIDER NOTES
Patient Seen in: [facility] In [place]      History     Chief Complaint   Patient presents with    Cough/URI     Stated Complaint: URI- sinus and ear infection dx. at urgent care last week states symptoms worse    Subjective:   HPI    48-year-old female who presents to the emergency department complaining of upper respiratory symptoms.  She has had issues with her sinuses in the past and she states she has had continued sinus discomfort and drainage.  She has completed antibiotic therapy as well as steroids for 3 days not 5 days.  Reviewing her record she was seen at the [place] immediate care on 2024 for the sinus disease.  She denies any fevers at this time.    Objective:   Past Medical History:    Acute pancreatitis (HCC)    Allergic rhinitis    Arthritis    Asthma (HCC)    Bloating    Constipation    Decorative tattoo    Don't remember when    Diabetes (HCC)    Esophageal reflux    Essential hypertension    Flatulence/gas pain/belching    Unknown    Food intolerance    Milk give gas    Frequent use of laxatives    Heartburn    Acid reflux    Hemangioma    Hemorrhoids    Sometimes    Lymphadenopathy    Migraines    Night sweats    Hot flashes    Obesity    Osteoarthritis    Pain with bowel movements    Due to hemorrhoids    Sleep disturbance    Due to night sweats    Wears glasses              Past Surgical History:   Procedure Laterality Date    Carpal tunnel release Bilateral     Foot surgery Right 2022    Excision soft tissue hemangioma right foot    Hysterectomy      age 36    Knee surgery Right     meniscus injury       1991, , 2009    Oophorectomy      age 36    Other surgical history  , , 2019    Hand surgery on both hands and knee surgery on my right knee    Total abdom hysterectomy  2010                Social History     Socioeconomic History    Marital status:    Tobacco Use    Smoking status: Never    Smokeless tobacco: Never   Vaping Use     Vaping status: Never Used   Substance and Sexual Activity    Alcohol use: Not Currently     Alcohol/week: 2.0 standard drinks of alcohol     Types: 2 Glasses of wine per week     Comment: Maybe every 2-3 months    Drug use: Never   Other Topics Concern    Caffeine Concern No    Exercise No    Seat Belt No    Special Diet No    Stress Concern Yes    Weight Concern Yes              Review of Systems    Positive for stated Chief Complaint: Cough/URI    Other systems are as noted in HPI.  Constitutional and vital signs reviewed.      All other systems reviewed and negative except as noted above.    Physical Exam     ED Triage Vitals [07/02/24 2237]   BP (!) 180/114   Pulse 86   Resp 13   Temp 97.4 °F (36.3 °C)   Temp src Oral   SpO2 97 %   O2 Device None (Room air)       Current Vitals:   Vital Signs  BP: (!) 180/114  Pulse: 86  Resp: 13  Temp: 97.4 °F (36.3 °C)  Temp src: Oral    Oxygen Therapy  SpO2: 97 %  O2 Device: None (Room air)            Physical Exam  General: This a pleasant nontoxic appearing patient in no apparent distress alert and oriented ×3  HEENT: Pupils are equal reactive to light.  Extra ocular motions are intact.  No scleral icterus or conjunctival pallor: Neck is supple without tenderness on palpation.  Head is atraumatic normocephalic.  Oral mucosa moist.  Tongue is midline.  No posterior pharyngeal lesions.  Tympanic members are intact and clear bilaterally.  No JVD or adenopathy.  She does have reproducible sinus discomfort on percussion over the frontal and maxillary sinuses.  Lungs: Clear to auscultation bilaterally.  No wheezes, rhonchi, or rales appreciated.  No accessory muscle use noted for breathing.  Cardiac: Regular rate and rhythm.  Normal S1 and 2 without murmurs or ectopy appreciated  Extremities: Moving all 4 with difficulty no deformities    ED Course     Labs Reviewed   RAPID SARS-COV-2 BY PCR - Normal   POCT FLU TEST - Normal    Narrative:     This assay is a rapid molecular in  vitro test utilizing nucleic acid amplification of influenza A and B viral RNA.                      MDM    Differential diagnosis includes but is not limited to acute sinusitis, chronic sinus disease, seasonal allergies, COVID, influenza.  The patient's influenza was negative.  COVID not detected.  The patient received ibuprofen for pain control.  The patient will be discharged.  She appears to have chronic sinus disease causing symptomology.  Informed that she should see an ENT for follow-up.  Was written for continued prednisone.  With her being afebrile and just completing antibiotic therapy she will not be written for new antibiotics.  Continue with Tylenol ibuprofen for pain control.  Patient was evaluated and a screening exam was performed.   As a treating physician attending to the patient, I determined, within reasonable clinical confidence and prior to discharge, that an emergency medical condition was not or was no longer present.  There was no indication for further evaluation, treatment or admission on an emergency basis.  Comprehensive verbal and written discharge and follow-up instructions were provided to help prevent relapse or worsening.  Patient was instructed to follow-up with their primary care provider for further evaluation and treatment, but to return immediately to the ER for worsening, concerning, new, changing or persisting symptoms.  I discussed the case with the patient and they had no questions, complaints, or concerns.  Patient felt comfortable going home.  ^^Please note that this report has been produced using speech recognition software and may contain errors related to that system including, but not limited to, errors in grammar, punctuation, and spelling, as well as words and phrases that possibly may have been recognized inappropriately.  If there are any questions or concerns, contact the dictating provider for clarification.  Note to Patient  The 21st Century Cures Act makes  medical notes like these available to patients in the interest of transparency. However, be advised this is a medical document and is intended as [id number] communication; it is written in medical language and may appear blunt, direct, or contain abbreviations or verbiage that are unfamiliar. Medical documents are intended to carry relevant information, facts as evident, and the clinical opinion of the practitioner.                             Medical Decision Making      Disposition and Plan     Clinical Impression:  1. Chronic sinusitis, unspecified location         Disposition:  Discharge  7/3/2024 12:11 am    Follow-up:  Dai Lees MD  [address]  [facility] [id number] [phone]    Schedule an appointment as soon as possible for a visit in 2 day(s)      Diomedes Figueroa MD  [address]  [facility] [id number] [phone]    Schedule an appointment as soon as possible for a visit in 1 week(s)            Medications Prescribed:  Current Discharge Medication List

## 2024-07-05 RX ORDER — CYCLOBENZAPRINE HCL 10 MG
10 TABLET ORAL NIGHTLY PRN
Qty: 30 TABLET | Refills: 0 | Status: SHIPPED | OUTPATIENT
Start: 2024-07-05

## 2024-07-05 NOTE — TELEPHONE ENCOUNTER
Location of Pain: middle back spasms. Patient has had these before and has seen Dr De Anda.           Patient stating that she is not taking Pregabalin as that medication made her feel sick once she started it. Also wants to discuss on further medication and also discuss in office prior to scheduling an Epidural if necessary, Celebrex helps with the patient's pain so she requested a refill.     Routing to Dr De Anda to review.     Scheduled patient for a follow up in [place] on 07/31/2024. Earlier appointments were available, however due to patient's job schedule an distance she preferred 07/31.

## 2024-07-08 ENCOUNTER — OFFICE VISIT (OUTPATIENT)
Dept: INTERNAL MEDICINE CLINIC | Facility: CLINIC | Age: 49
End: 2024-07-08
Payer: COMMERCIAL

## 2024-07-08 VITALS
DIASTOLIC BLOOD PRESSURE: 78 MMHG | WEIGHT: 200.19 LBS | BODY MASS INDEX: 34 KG/M2 | TEMPERATURE: 98 F | SYSTOLIC BLOOD PRESSURE: 136 MMHG | HEART RATE: 97 BPM | OXYGEN SATURATION: 99 %

## 2024-07-08 DIAGNOSIS — I10 ESSENTIAL HYPERTENSION, BENIGN: ICD-10-CM

## 2024-07-08 DIAGNOSIS — J45.20 MILD INTERMITTENT ASTHMA WITHOUT COMPLICATION (HCC): ICD-10-CM

## 2024-07-08 DIAGNOSIS — J32.9 RECURRENT RHINOSINUSITIS: Primary | ICD-10-CM

## 2024-07-08 PROCEDURE — 99214 OFFICE O/P EST MOD 30 MIN: CPT | Performed by: INTERNAL MEDICINE

## 2024-07-08 PROCEDURE — 3075F SYST BP GE 130 - 139MM HG: CPT | Performed by: INTERNAL MEDICINE

## 2024-07-08 PROCEDURE — 3078F DIAST BP <80 MM HG: CPT | Performed by: INTERNAL MEDICINE

## 2024-07-08 RX ORDER — IBUPROFEN 400 MG/1
400 TABLET ORAL EVERY 12 HOURS PRN
Qty: 30 TABLET | Refills: 0 | Status: SHIPPED | OUTPATIENT
Start: 2024-07-08

## 2024-07-08 RX ORDER — AZELASTINE 1 MG/ML
1 SPRAY, METERED NASAL 2 TIMES DAILY
Qty: 30 ML | Refills: 0 | Status: SHIPPED | OUTPATIENT
Start: 2024-07-08

## 2024-07-08 RX ORDER — DOXYCYCLINE 100 MG/1
100 CAPSULE ORAL 2 TIMES DAILY
Qty: 28 CAPSULE | Refills: 0 | Status: SHIPPED | OUTPATIENT
Start: 2024-07-08 | End: 2024-07-22

## 2024-07-08 RX ORDER — ALBUTEROL SULFATE 90 UG/1
2 AEROSOL, METERED RESPIRATORY (INHALATION) EVERY 4 HOURS PRN
Qty: 3 EACH | Refills: 2 | Status: SHIPPED | OUTPATIENT
Start: 2024-07-08

## 2024-07-08 NOTE — PATIENT INSTRUCTIONS
- Start antibiotics, doxycycline.  Take 1 capsule twice daily for 2 weeks  - Use azelastine nasal spray twice daily  - Ibuprofen prescription sent to pharmacy, take twice daily as needed for pain  - Albuterol inhaler refilled  - You can try these ENT groups to see if they can see you sooner:  [facility]  [address]  [address]  [place], [place]  [id number] [phone]    [facility]  [address]  [address]  [place], [place] [id number] [phone]    [address] [place] [address], [address]  [place], [place] [id number] [phone]    - Follow up with Dr. Carlton as needed/scheduled.    It was a pleasure seeing you in the clinic today.  Thank you for choosing the [facility] office for your healthcare needs. Please call at [phone] with any questions or concerns.    Dee Wood MD

## 2024-07-08 NOTE — PROGRESS NOTES
María Elena Hurtado is a 48 year old female.   HPI:   Patient presents for follow up on sinus/URI issues.  Patient of Dr. Carlton.     She has been dealing with sinus pain/pressure, nasal congestion, ear pain/pressure, post-nasal drip for the past month.  Seen at immediate care, emergency department.  Given dexamethasone at immediate care.  Prescribed cefdinir, prednisone by immediate care, then prednisone again by emergency department.  Still with persistent symptoms.  Has appointment with ENT next month.  Has taken several OTC medications including Mucinex, ibuprofen, tylenol, fluticasone nasal spray, fexofenadine.  Starting to have some shortness of breath now.  No fevers/chills/sweats.  No cough.      Other chronic issues:  Hypertension - at goal blood pressure.    Past medical, family, surgical and social history were reviewed as listed in the chart, and are unchanged from previous visit.  REVIEW OF SYSTEMS:   GENERAL/ const: no fevers/chills, no unintentional weight loss  HEENT: nasal congestion, sinus pain/pressure  LUNGS: occasional shortness of breath   CARDIOVASCULAR: denies chest pain  GI: denies nausea/emesis/ abdominal pain diarrhea constipation  MUSCULOSKELETAL: left-sided neck/facial pain  NEURO: sinus headaches  ALLERGY:   Allergies   Allergen Reactions    Penicillins HIVES    Dust Mite Extract     Pollen Extract OTHER (SEE COMMENTS)    Amoxicillin-Pot Clavulanate ITCHING     rash    Seasonal ITCHING     PAST HISTORY:     Current Outpatient Medications:     Doxycycline Monohydrate 100 MG Oral Cap, Take 1 capsule (100 mg total) by mouth 2 (two) times daily for 14 days., Disp: 28 capsule, Rfl: 0    ibuprofen 400 MG Oral Tab, Take 1 tablet (400 mg total) by mouth every 12 (twelve) hours as needed for Pain., Disp: 30 tablet, Rfl: 0    azelastine 0.1 % Nasal Solution, 1 spray by Nasal route 2 (two) times daily., Disp: 30 mL, Rfl: 0    CYCLOBENZAPRINE 10 MG Oral Tab, TAKE 1 TABLET BY MOUTH EVERY DAY AT NIGHT  AS NEEDED FOR MUSCLE SPASM, Disp: 30 tablet, Rfl: 0    predniSONE 20 MG Oral Tab, Take 2 tablets (40 mg total) by mouth daily for 5 days., Disp: 10 tablet, Rfl: 0    MELOXICAM 15 MG Oral Tab, TAKE 1 TABLET (15 MG TOTAL) BY MOUTH DAILY WITH FOOD, Disp: 30 tablet, Rfl: 0    NORTRIPTYLINE 10 MG Oral Cap, TAKE 1 CAPSULE BY MOUTH EVERY DAY (Patient not taking: Reported on 6/24/2024), Disp: 90 capsule, Rfl: 0    semaglutide-weight management (WEGOVY) 2.4 MG/0.75ML Subcutaneous Solution Auto-injector, Inject 0.75 mL (2.4 mg total) into the skin once a week., Disp: 9 mL, Rfl: 0    losartan 100 MG Oral Tab, Take 1 tablet (100 mg total) by mouth daily., Disp: 90 tablet, Rfl: 3    cetirizine 10 MG Oral Tab, Take 1 tablet (10 mg total) by mouth daily., Disp: 90 tablet, Rfl: 1    metoprolol succinate ER 25 MG Oral Tablet 24 Hr, Take 0.5 tablets (12.5 mg total) by mouth daily., Disp: 45 tablet, Rfl: 3    pregabalin 25 MG Oral Cap, 1 cap PO at bedtime x1 week, then 1 cap PO BID (Patient not taking: Reported on 6/24/2024), Disp: 60 capsule, Rfl: 0    cyclobenzaprine 5 MG Oral Tab, Take 1 tablet (5 mg total) by mouth nightly as needed for Muscle spasms. (Patient not taking: Reported on 7/2/2024), Disp: 30 tablet, Rfl: 0    FLUTICASONE PROPIONATE 50 MCG/ACT Nasal Suspension, SPRAY 2 SPRAYS BY NASAL ROUTE DAILY, Disp: 48 mL, Rfl: 1    Olopatadine HCl ([facility] OLOPATADINE HCL) 0.2 % Ophthalmic Solution, Apply 1 drop to eye daily. INSTILL 1 DROP INTO AFFECTED EYE EVERY DAY, Disp: 2.5 mL, Rfl: 5    montelukast 10 MG Oral Tab, TAKE 1 TABLET BY MOUTH EVERY DAY AT NIGHT, Disp: 90 tablet, Rfl: 3    ADVAIR DISKUS 250-50 MCG/ACT Inhalation Aerosol Powder, Breath Activated, Inhale into the lungs 2 (two) times daily. (Patient not taking: Reported on 7/2/2024), Disp: , Rfl:     amLODIPine 5 MG Oral Tab, Take 2 tablets (10 mg total) by mouth daily., Disp: 90 tablet, Rfl: 3    triamcinolone 0.1 % External Cream, Apply topically 2 (two) times daily as  needed., Disp: 60 g, Rfl: 0    albuterol 108 (90 Base) MCG/ACT Inhalation Aero Soln, Inhale 2 puffs into the lungs every 4 (four) hours as needed for Wheezing or Shortness of Breath., Disp: 3 each, Rfl: 2    aspirin 81 MG Oral Tab EC, Take 1 tablet (81 mg total) by mouth daily. (Patient not taking: Reported on 3/8/2024), Disp: 30 tablet, Rfl: 0  Medical:  has a past medical history of Acute pancreatitis (HCC), Allergic rhinitis (Unknown), Arthritis (6 yrs ago), Asthma (HCC), Bloating, Constipation, Decorative tattoo (), Diabetes (HCC), Esophageal reflux (8yrs ago), Essential hypertension, Flatulence/gas pain/belching, Food intolerance, Frequent use of laxatives, Heartburn, Hemangioma (2022), Hemorrhoids, Lymphadenopathy (2022), Migraines, Night sweats, Obesity (10 yrs ago), Osteoarthritis (Unknown), Pain with bowel movements, Sleep disturbance, and Wears glasses (Unknown).  Surgical:  has a past surgical history that includes total abdom hysterectomy (); knee surgery (Right, ); carpal tunnel release (Bilateral); hysterectomy; oophorectomy; foot surgery (Right, 2022);  (, , ); and other surgical history (, , ).  Family: family history includes Breast Cancer in her maternal grandmother and paternal grandmother; Cancer in her father, maternal grandmother, and paternal grandmother; Colon Cancer in her paternal grandmother; Colon Polyps in her mother; Diabetes in her mother; Heart Attack in her mother; Heart Disease in her mother; Heart Disorder in her mother; Hypertension in her mother; Prostate Cancer in her father.  Social:  reports that she has never smoked. She has never used smokeless tobacco. She reports that she does not currently use alcohol after a past usage of about 2.0 standard drinks of alcohol per week. She reports that she does not use drugs.  Wt Readings from Last 6 Encounters:   24 200 lb 3.2 oz (90.8 kg)   24 195 lb (88.5 kg)    06/24/24 198 lb (89.8 kg)   03/08/24 195 lb (88.5 kg)   02/07/24 195 lb (88.5 kg)   02/05/24 195 lb (88.5 kg)     EXAM:   /78 (BP Location: Right arm, Patient Position: Sitting, Cuff Size: large)   Pulse 97   Temp 98.2 °F (36.8 °C) (Temporal)   Wt 200 lb 3.2 oz (90.8 kg)   LMP 09/20/2010   SpO2 99%   Breastfeeding No   BMI 34.36 kg/m²   GENERAL: Alert and oriented, well developed, well nourished,in no apparent distress  HEENT: atraumatic, PERRLA, EOMI, normal lid and conjunctiva, normal external ear canals and tympanic membranes bilaterally, bilateral frontal and maxillary sinus tenderness  NECK: tender cervical lymphadenopathy; otherwise supple, no jvd, no thyromegaly  LUNGS: clear to auscultation bilaterally, no wheezing/rubs  CARDIO: RRR without murmurs.  No clubbing, cyanosis or edema.  GI: soft non tender nondistended no hepatosplenomegaly, bowel sounds throughout  NEURO: CN II-XII intact, 5/5 strength all extremities  PSYCH: pleasant, appropriate mood and affect  ASSESSMENT AND PLAN:   1. Recurrent rhinosinusitis  2. Mild intermittent asthma without complication (HCC)  Patient with persistent sinus/URI symptoms for almost a month.  Seen at immediate care, emergency department.  First prescribed cephalexin, prednisone.  Then another round of prednisone.  Taking a lot of OTC therapies.  No improvement in symptoms.  Will start doxycycline x 2 weeks.  Azelastine nasal spray prescribed.  No wheezing on examination, she does have asthma, some intermittent shortness of breath past 1-2 days.  Will refill albuterol inhaler.  Information provided for ENT groups.  Advised to go to emergency department with any worsening shortness of breath.  - Doxycycline Monohydrate 100 MG Oral Cap; Take 1 capsule (100 mg total) by mouth 2 (two) times daily for 14 days.  Dispense: 28 capsule; Refill: 0  - ibuprofen 400 MG Oral Tab; Take 1 tablet (400 mg total) by mouth every 12 (twelve) hours as needed for Pain.   Dispense: 30 tablet; Refill: 0  - azelastine 0.1 % Nasal Solution; 1 spray by Nasal route 2 (two) times daily.  Dispense: 30 mL; Refill: 0  - albuterol 108 (90 Base) MCG/ACT Inhalation Aero Soln; Inhale 2 puffs into the lungs every 4 (four) hours as needed for Wheezing or Shortness of Breath.  Dispense: 3 each; Refill: 2    3. Essential hypertension, benign  At goal blood pressure.  Continue losartan 100 mg every day, metoprolol succinate 12.5 mg every day, amlodipine 5 mg every day.    Patient Care Team:  Dai Lees MD as PCP - General (Internal Medicine)  Raz Jacobs MD (GASTROENTEROLOGY)  Ann Adame DPM (PODIATRIST)  Mila Rodney PT as Physical Therapist (Physical Therapy)  Margy Bhatia APRN (Nurse Practitioner Family)  The patient indicates understanding of these issues and agrees to the plan.  The patient is asked to return to clinic in 6 months with Dr. Carlton for follow up on chronic issues, or earlier if acute issues arise.    Dee Wood MD

## 2024-07-25 ENCOUNTER — PATIENT MESSAGE (OUTPATIENT)
Dept: INTERNAL MEDICINE CLINIC | Facility: CLINIC | Age: 49
End: 2024-07-25

## 2024-07-25 ENCOUNTER — OFFICE VISIT (OUTPATIENT)
Dept: INTERNAL MEDICINE CLINIC | Facility: CLINIC | Age: 49
End: 2024-07-25
Payer: COMMERCIAL

## 2024-07-25 VITALS
BODY MASS INDEX: 34.66 KG/M2 | DIASTOLIC BLOOD PRESSURE: 84 MMHG | RESPIRATION RATE: 16 BRPM | HEIGHT: 64 IN | WEIGHT: 203 LBS | HEART RATE: 101 BPM | SYSTOLIC BLOOD PRESSURE: 122 MMHG

## 2024-07-25 DIAGNOSIS — Z51.81 THERAPEUTIC DRUG MONITORING: Primary | ICD-10-CM

## 2024-07-25 DIAGNOSIS — E66.9 OBESITY (BMI 30-39.9): ICD-10-CM

## 2024-07-25 DIAGNOSIS — R73.03 PREDIABETES: ICD-10-CM

## 2024-07-25 DIAGNOSIS — I10 ESSENTIAL HYPERTENSION, BENIGN: ICD-10-CM

## 2024-07-25 DIAGNOSIS — K59.00 CONSTIPATION, UNSPECIFIED CONSTIPATION TYPE: ICD-10-CM

## 2024-07-25 PROCEDURE — 3008F BODY MASS INDEX DOCD: CPT | Performed by: NURSE PRACTITIONER

## 2024-07-25 PROCEDURE — 3074F SYST BP LT 130 MM HG: CPT | Performed by: NURSE PRACTITIONER

## 2024-07-25 PROCEDURE — 3079F DIAST BP 80-89 MM HG: CPT | Performed by: NURSE PRACTITIONER

## 2024-07-25 PROCEDURE — 99213 OFFICE O/P EST LOW 20 MIN: CPT | Performed by: NURSE PRACTITIONER

## 2024-07-25 RX ORDER — SULFAMETHOXAZOLE AND TRIMETHOPRIM 800; 160 MG/1; MG/1
1 TABLET ORAL 2 TIMES DAILY
COMMUNITY
Start: 2024-07-24 | End: 2024-08-03

## 2024-07-25 NOTE — PATIENT INSTRUCTIONS
Next steps:  1.  Fill your prescribed medication and take as discussed and prescribed: wegovy 2.4mg weekly  2.  Schedule a personal nutrition consultation with one of our registered dieticians  3. Check with insurance on coverage for zepbound      Please try to work on the following dietary changes:  Daily protein recommendation to start:  grams  Daily carbohydrate: <115g  Daily calories: 1,300-1,400  1.  Drink water with meals and throughout the day, cut down on soda and/or juice if consumed. Consider flavored water options like Bubbly, Spindrift, Hint and Naren.  2.  Eat breakfast daily and focus on having protein with each meal, examples include: greek yogurt, cottage cheese, hard boiled egg, whole grain toast with peanut butter.   3.  Reduce refined carbohydrates and sugars which includes items such as sweets, as well as rice, pasta, and bread and make sure to choose whole grain options when having them with just 1 serving per meal about the size of your inner palm.  4.  Consume non starchy veggies daily working towards making them a good 50% of your daily food intake. Add them to lunch and dinner consistently.  5.  Start a daily probiotic: VSL#3 is recommended, (order on line at www.vsl3.com). Take 1 capsule daily with water for 30 days, then reduce to 1 every other day (this will reduce the cost). Capsules can be left out for 2 weeks, but then must be refrigerated.      Please download stella My Fitness Pal, LoseIt! Or Net Diary to monitor daily dietary intake and you will be able to see if you are eating the right amount of calories or too much or too little which would hinder weight loss. Additionally this will help to see your daily carbohydrate and protein intake. When you set the stella up choose 1-2 lbs/week as a goal.  Keeping a paper food journal is an option as well to remain accountable for your choices- this is the start to mindful eating! A low calorie diet has been consistently shown to support  weight loss.     Continue or start exercising to help establish a routine. If not already exercising begin with 1 day and progress as able with long-term goal of 30 minutes 5 days a week at a minimum.     Meditation daily can help manage and control stress. Chronic stress can make weight loss difficult.  Exercising is one way to help with stress, but meditation using the CALM Leif or another comparable alternative can be done in your home or place of work with little time commitment. This Leif can also help work on behavior change and improve sleep. Check out the segment under Calm Masterclass and listen to The 4 Pillars of Health. A great way to begin learning about the foundation of lifestyle with practical tips to use in your every day.     Check out www.yourweightmatters.org blog for continued daily support and education along this weight loss journey!    Patient Resources:     Personal Training/Fitness Classes/Health Coaching     [facility] @ [email or web address].org/healthy-driven/[email or web address]-center Full fitness center with group fitness and personal training. Discount available as client of [facility].  Health Coaching and Personal Training with Nikki Diallo at our [facility]- individual weekly coaching with option to add personal training and small group fitness classes targeted at weight loss- [phone] and/or email @ [email or web address]  360FIT [place] http://www.Breathe Technologies. Group Fitness [phone] and/or email [place] at [email or web address]  [facility] Fitness Centers with multiple locations: JasonDB (www.Playblazer), Eat The Frog Fitness (www.emoquo.PowerCell [place]), Fit Body Bootcamp (www.Blackstone Digital AgencybodybootFlowCop.com), Transaction Wireless Fitness (www.FreePriceAlerts.PowerCell [place]), The Exercise  (www.exercisecoach.PowerCell [place])     Online Fitness  Fitness  on Utube  Fit in 10 DVD series- www.qbcjf35BFUKratos Technology  Sit and Be Fit  - Chair exercise series Www.sitandbefit.org  Hip Hop Fit with Alonzo Thrasher at www.hiphopfit.net     Apps for on the Go Fitness  Onekama 7 Minute Workout (orange box with white 7) - free on the go HIIT training leif  Peloton Leif @ www.onepeloton.com     Nutrition Trackers and Tools  LoseIT! And My Fitness Pal apps and on line for tracking nutrition  NOOM - virtual health coaching  FitFoundation (healthy meals on the go) in [place] @ www.pliatitugqxee6sDwellable  Sugar CANAS @ wwwSmarterphonebistromd.com and Aghxab57 (keto and low carb plans recommended) @ www.zgawnl34.com, Metabolic Meals @ www.EnerveeMetabolicMeals.Green Man Gaming - individual prepared meals to go  Gobble, Blue Apron, Home , Every Plate, Sunbasket- on line meal delivery programs for preparation at home  Meal Village in [place] for homemade meals to go @ www.mealDineGasmllage.Green Man Gaming  Diet Doctor @ www.dietdoctor.[employer] Man Gaming - low carb swaps  Yummly - meal prep and planning leif (www.yummly.com)     Stress Management/Behavior/Mindful Eating  CALM meditation leif (www.calm.com)  Headspace  Am I Hungry? Mindful eating virtual  leif  Www.yourweightmatters.org - Obesity Action Coalition sponsored Blog posts daily  Motivation leif (black box with white \")- daily supportive messages sent to your phone     Books/Video Education/Podcasts  Mindless Eating by Galo Wilkinson  Why We Get Sick by Librado Lemons (a book about insulin resistance)  Atomic Habits by Khari Chang (a book about taking small steps to promote greater behavior change)   Can't Hurt Me by Davi Perry (a book exploring the power of discipline in achieving your goals)  The End of Dieting: How to Live for Life by Dr. Eliel Hussein M.D. or listen to The Kaboodle Podcast Episode 63: Understanding \"Nutritarian\" Eating w/Dr. Eliel Hussein  Your Body in Balance: The New Science of Food, Hormones, and Health by Dr. Mckay Plascencia  The Menopause Diet Plan by Kirsty Nichols and Elyse Cobos  The Complete Guide to fasting by Dr. Shahram Pinedo,  Salt & Fat by Mary Harrison, Ph.D, R.D.  Weight Loss Surgery Will Not Treat Food Addiction by Cheryl Larsen Ph.D  The Game Changers- RadPadix Documentary on plant based nutrition  Fed Up - documentary about obesity (Free on Utube)  The Truth About Sugar - documentary on sugar (Free on Utube, https://youtu.be/7J1kgqtNR7n)  The Dr. Baird T5 Wellness Plan by Dr. Lex Baird MD  Fitlosophy Fitspiration - journal to better health (found at [employer] in fitness aisle)  What Happened to You?- a look at the impact trauma has on behavior written by Bharti Washington and Dr. Norris Patel  Whole Again by Gab Stewart - discovering your true self after trauma  Ishmael Lanza talk on Fuelzee, The Call to Courage  Podcasts: The Exam Room by the Physician's Committee, Nutrition Facts by Dr. Claros    We are here to support you with weight loss, but please remember that you still need your primary care provider for your routine health maintenance.

## 2024-07-25 NOTE — PROGRESS NOTES
HISTORY OF PRESENT ILLNESS  Chief Complaint   Patient presents with    Weight Check     Up 8      María Elena Hurtado is a 48 year old female here for follow up with medical weight loss program for the treatment of overweight, obesity, or morbid obesity.     Up #8 lbs (f/u from 2/2024)  Compliant on wegovy 2.4mg weekly   Tolerating well, helping with decreasing appetite and no side effects     Has been suffering with chronic sinusitis for the past month (has CT scan this weekend)   Feels like she has hit a plateau with weight   Exercise/Activity: not doing anything routine as far as exercise (for the past month due to illness)   Nutrition: eating regular meals, +protein, minimal veggies. not tracking reports  Meals out per week on average: 1  Stress is manageable   Sleep: 4-5 hours/night, waking up feeling tired at times (hot flashes, night sweats)     Denies chest pain, shortness of breath, dizziness, blurred vision, headache, paresthesia, nausea/vomiting.     Breakfast Lunch Dinner Snacks Fluids   Reviewed              Wt Readings from Last 6 Encounters:   07/25/24 203 lb (92.1 kg)   07/08/24 200 lb 3.2 oz (90.8 kg)   07/02/24 195 lb (88.5 kg)   06/24/24 198 lb (89.8 kg)   03/08/24 195 lb (88.5 kg)   02/07/24 195 lb (88.5 kg)          REVIEW OF SYSTEMS  GENERAL: feels well otherwise, denied any fevers chills or night sweats   LUNGS: denies shortness of breath  CARDIOVASCULAR: denies chest pain  GI: denies abdominal pain  MUSCULOSKELETAL: +chronic back pain, +joint pains (bilat. Knee pain)   PSYCH: denies change in behavior or mood, denies feeling sad or depressed    EXAM  /84   Pulse 101   Resp 16   Ht 5' 4\" (1.626 m)   Wt 203 lb (92.1 kg)   LMP 09/20/2010   BMI 34.84 kg/m²       GENERAL: well developed, well nourished, in no apparent distress, A/O x3  SKIN: no rashes, no suspicious lesions  HEENT: atraumatic, normocephalic, OP-clear, PERRLA  NECK: supple, no adenopathy  LUNGS: CTA in all fields,  breathing non labored  CARDIO: RRR without murmur  GI: +BS, NT/ND, no masses or HSM  EXTREMITIES: no cyanosis, no clubbing, no edema    Lab Results   Component Value Date    GLU 75 01/04/2024    BUN 15 01/04/2024    BUNCREA 8.8 (L) 12/08/2019    CREATSERUM 0.91 01/04/2024    ANIONGAP 3 01/04/2024    GFR 78 09/20/2016    GFRNAA 63 05/28/2022    GFRAA 73 05/28/2022    CA 9.2 01/04/2024    OSMOCALC 292 01/04/2024    ALKPHO 61 01/04/2024    AST 6 (L) 01/04/2024    ALT 15 01/04/2024    BILT 0.3 01/04/2024    TP 7.1 01/04/2024    ALB 3.9 01/04/2024    GLOBULIN 3.2 01/04/2024     01/04/2024    K 4.1 01/04/2024     01/04/2024    CO2 30.0 01/04/2024     Lab Results   Component Value Date     08/07/2023    A1C 5.9 (H) 08/07/2023     Lab Results   Component Value Date    CHOLEST 184 08/07/2023    TRIG 76 08/07/2023    HDL 52 08/07/2023     (H) 08/07/2023    VLDL 13 08/07/2023    NONHDLC 132 (H) 08/07/2023     No results found for: \"B12\", \"VITB12\"  Lab Results   Component Value Date    VITD 22.3 (L) 08/07/2023       Current Outpatient Medications on File Prior to Visit   Medication Sig Dispense Refill    sulfamethoxazole-trimethoprim -160 MG Oral Tab per tablet Take 1 tablet by mouth 2 (two) times daily.      azelastine 0.1 % Nasal Solution 1 spray by Nasal route 2 (two) times daily. 30 mL 0    albuterol 108 (90 Base) MCG/ACT Inhalation Aero Soln Inhale 2 puffs into the lungs every 4 (four) hours as needed for Wheezing or Shortness of Breath. 3 each 2    CYCLOBENZAPRINE 10 MG Oral Tab TAKE 1 TABLET BY MOUTH EVERY DAY AT NIGHT AS NEEDED FOR MUSCLE SPASM 30 tablet 0    MELOXICAM 15 MG Oral Tab TAKE 1 TABLET (15 MG TOTAL) BY MOUTH DAILY WITH FOOD 30 tablet 0    semaglutide-weight management (WEGOVY) 2.4 MG/0.75ML Subcutaneous Solution Auto-injector Inject 0.75 mL (2.4 mg total) into the skin once a week. 9 mL 0    losartan 100 MG Oral Tab Take 1 tablet (100 mg total) by mouth daily. 90 tablet 3     cetirizine 10 MG Oral Tab Take 1 tablet (10 mg total) by mouth daily. 90 tablet 1    metoprolol succinate ER 25 MG Oral Tablet 24 Hr Take 0.5 tablets (12.5 mg total) by mouth daily. 45 tablet 3    FLUTICASONE PROPIONATE 50 MCG/ACT Nasal Suspension SPRAY 2 SPRAYS BY NASAL ROUTE DAILY 48 mL 1    Olopatadine HCl (CVS OLOPATADINE HCL) 0.2 % Ophthalmic Solution Apply 1 drop to eye daily. INSTILL 1 DROP INTO AFFECTED EYE EVERY DAY 2.5 mL 5    montelukast 10 MG Oral Tab TAKE 1 TABLET BY MOUTH EVERY DAY AT NIGHT 90 tablet 3    ADVAIR DISKUS 250-50 MCG/ACT Inhalation Aerosol Powder, Breath Activated Inhale into the lungs 2 (two) times daily.      amLODIPine 5 MG Oral Tab Take 2 tablets (10 mg total) by mouth daily. 90 tablet 3    triamcinolone 0.1 % External Cream Apply topically 2 (two) times daily as needed. 60 g 0    aspirin 81 MG Oral Tab EC Take 1 tablet (81 mg total) by mouth daily. 30 tablet 0    ibuprofen 400 MG Oral Tab Take 1 tablet (400 mg total) by mouth every 12 (twelve) hours as needed for Pain. (Patient not taking: Reported on 7/25/2024) 30 tablet 0    NORTRIPTYLINE 10 MG Oral Cap TAKE 1 CAPSULE BY MOUTH EVERY DAY (Patient not taking: Reported on 7/25/2024) 90 capsule 0     No current facility-administered medications on file prior to visit.       ASSESSMENT/PLAN    ICD-10-CM    1. Therapeutic drug monitoring  Z51.81       2. Obesity (BMI 30-39.9)  E66.9       3. Essential hypertension, benign  I10       4. Prediabetes  R73.03       5. Constipation, unspecified constipation type  K59.00           PLAN   Initial Weight Data and Goal Weight Loss:  Initial consult: # 215 lbs on 2/2023  Weight Calculations  Initial Weight: 215 lbs  Initial Weight Date: 01/02/23  Today's Weight: 203 lbs  5% Goal: 10.75  10% Goal: 21.5  Total Weight Loss: 12 lbs  Total weight loss: up #8 lbs total, Net loss 12 lbs  Continue with medications:  wegovy 2.4mg weekly    Is going to see if insurance will cover zepbound and think about  switching to wegovy    --advised of side effects and adverse effects of this medication  Contradictions: has done phentermine in the past, ozempic, rybelsus   Reviewed labs  HTN  stable, follows with PCP  prediabetes, reviewed last a1c 5.9% on 8/2023  Joint pain, encouraged aqua therapy, going to see specialist this week   Interm. Snoring, can think about possibly doing a sleep study in the future   Wrote out macros and encouraged tracking food   Nutrition: Low carb diet, recommended to eat breakfast daily/ regular protein intake  Follow up with dietitian and psychologist as recommended.  Discussed the role of sleep and stress in weight management.  Counseled on comprehensive weight loss plan including attention to nutrition, exercise and behavior/stress management for success. See patient instruction below for more details.  Discussed strategies to overcome barriers to successful weight loss and weight maintenance  FITTE: ACSM recommendations (150-300 minutes/ week in active weight loss)   Weight Loss Consent to treat reviewed and signed.    Total time spent on chart review, pre-charting, obtaining history, counseling, and educating, reviewing labs was 25 minutes.       NOTE TO PATIENT: The 21st Century Cures Act makes clinical notes like these available to patients in the interest of transparency. Clinical notes are medical documents used by physicians and care providers to communicate with each other. These documents include medical language and terminology, abbreviations, and treatment information that may sound technical and at times possibly unfamiliar. In addition, at times, the verbiage may appear blunt or direct. These documents are one tool providers use to communicate relevant information and clinical opinions of the care providers in a way that allows common understanding of the clinical context.     There are no Patient Instructions on file for this visit.    No follow-ups on file.    Patient verbalizes  understanding.    Margy Bhatia, APRN

## 2024-07-26 RX ORDER — TIRZEPATIDE 7.5 MG/.5ML
7.5 INJECTION, SOLUTION SUBCUTANEOUS WEEKLY
Qty: 2 ML | Refills: 1 | Status: SHIPPED | OUTPATIENT
Start: 2024-07-26 | End: 2024-08-17

## 2024-07-26 NOTE — TELEPHONE ENCOUNTER
From: María Elena Hurtado  To: Margy Bhatia  Sent: 7/25/2024 6:14 PM CDT  Subject: Prescription update     Hi, I called my insurance provider and they will pay for the Zepbound. They will need the authorization from your office. I believe they will send over the paperwork to you for new script to be approved.   Also, [facility] in [place] [id number] has Zepbound 7.5 in stock.

## 2024-07-31 ENCOUNTER — OFFICE VISIT (OUTPATIENT)
Dept: PHYSICAL MEDICINE AND REHAB | Facility: CLINIC | Age: 49
End: 2024-07-31
Payer: COMMERCIAL

## 2024-07-31 ENCOUNTER — TELEPHONE (OUTPATIENT)
Dept: PHYSICAL MEDICINE AND REHAB | Facility: CLINIC | Age: 49
End: 2024-07-31

## 2024-07-31 VITALS — BODY MASS INDEX: 34.66 KG/M2 | HEIGHT: 64 IN | WEIGHT: 203 LBS

## 2024-07-31 DIAGNOSIS — M48.062 LUMBAR STENOSIS WITH NEUROGENIC CLAUDICATION: Primary | ICD-10-CM

## 2024-07-31 PROCEDURE — 99214 OFFICE O/P EST MOD 30 MIN: CPT | Performed by: PHYSICAL MEDICINE & REHABILITATION

## 2024-07-31 PROCEDURE — 3008F BODY MASS INDEX DOCD: CPT | Performed by: PHYSICAL MEDICINE & REHABILITATION

## 2024-07-31 NOTE — PROGRESS NOTES
Progress note    C/C:   Chief Complaint   Patient presents with    Follow - Up     LOV 3/8/24 Pt is here for a follow up on back pain. Intermittent n/t. Takes cyclobenzaprine, and tylenol. No current physical therapy. Pain 8/10      HPI: 48-year-old female presents for follow-up.  Since last time I saw her she has had increased back pain; legs feel as though they are swelling and they are going to give out. Throbbing in the thighs, hips, buttocks. With sitting there is tingling in both feet, but not when standing and walking. Has been taking nortripytline, but makes her lightheaded, similar SE to what she had with gabapentin.  Back and leg pain are equal.    Pertinent allergies:   Allergies   Allergen Reactions    Penicillins HIVES    Dust Mite Extract     Pollen Extract OTHER (SEE COMMENTS)    Amoxicillin-Pot Clavulanate ITCHING     rash    Seasonal ITCHING        Physical exam:  Ht 64\"   Wt 203 lb (92.1 kg)   LMP 09/20/2010   BMI 34.84 kg/m²      Lumbar spine exam:    No skin rash lumbar/upper sacral region  No pain with lumbar flexion. + pain with lumbar extension.  5/5 LE strength b/l  SILT b/l LE  2/4 quad, gastrocs reflexes b/l  Facet loading negative b/l  SLR negative b/l    Imaging: No new imaging to review    Assessment and plan  Lumbar stenosis with neurogenic claudication  Hypertension  Asthma  Obesity  GERD    We discussed her treatment options given the persistent symptoms despite physical therapy-we can either do a trial of duloxetine given the longstanding symptoms, or do bilateral 5 transforaminal epidural steroid injection under fluoroscopy. We discussed the risks of procedure, including bleeding, infection, nerve injury, HA; elects to proceed with injection.  She defers duloxetine due to possible side effects, which I instructed her in general can be similar side effects to what she had with gabapentin and nortriptyline. She is favoring having the injection done under either IVCS or MAC for  situational anxiety.    F/u for injection.     Kyle De Anda DO  Physical Medicine and Rehabilitation  [facility]

## 2024-07-31 NOTE — TELEPHONE ENCOUNTER
Patient has been scheduled for bilateral L5 transforaminal epidural steroid injection on 8/23/24 at [facility] with Dr. De Anda.   Anesthesia type:  MAC  Please note: [facility] will call the business day prior to discuss the exact time/arrival and additional instructions for your appointment.  Patient was advised that if he/she does receive the covid vaccine it needs to be at least 2 weeks before or after the injection.  Medications and allergies reviewed.  Educated to hold NSAIDS (Aleve, Ibuprofen, Motrin, Advil) and anti-inflammatories (Meloxicam, Naproxen, Diclofenac, Celebrex)  and for cervical injections must hold Multi-Vitamins, Vitamin E, Fish Oil/Omega-3 for 3 days prior to procedure.  If patient is receiving MAC, weight loss oral/injectable medications will need to be held for 7 days prior to injection.  Patient informed to fast 8 hours prior to procedure and 10-12 hours prior to procedure with MAC if patient is on a weight loss medication.   If on blood thinner, clearance has been received and approved to hold this medication by provider.   Patient informed of [facility]'s  policy:  he/she will need a  to and from procedure.   [facility]  Address: [address], [place] [address]  P: [phone]  Patient verbalized understanding and agrees with plan.  Scheduled in Epic: Yes  Scheduled in Surgical Case: Yes  Follow up appointment made: NOV: Visit date not found

## 2024-07-31 NOTE — PATIENT INSTRUCTIONS
We will call you to schedule the injection once we have insurance approval. You may request to have it done at [facility] if the wait time at [facility] is too long.     You will have to hold the weight loss medications if we do the procedure under sedation; we will tell you when to hold it.

## 2024-07-31 NOTE — TELEPHONE ENCOUNTER
Initiated authorization for Bilateral L5 transforaminal epidural steroid injection under fluoroscopy.  CPT/HCPCS 37619-32, dx:M48.062 to be done at [facility] with Availity     IVCS or MAC for situational anxiety (if done at [facility]). Patient prefers [facility] if not a long wait.        Status: Approved - no action required  Reference/Authorization # [id number]  Valid: 7/31/24-10/31/24

## 2024-08-02 NOTE — TELEPHONE ENCOUNTER
Patient called to advise she was offered 8/9/24 and 8/23 and she is currently scheduled for 8/23 and would like to move up to 8/9/24.    This RN changed in EPIC and sent Change request through Case Tabs.    Nothing further required at this time.

## 2024-08-05 DIAGNOSIS — J32.9 RECURRENT RHINOSINUSITIS: ICD-10-CM

## 2024-08-05 NOTE — TELEPHONE ENCOUNTER
LOV: 7/8/2024 with Dr. Wood  RTC: 6 months  Last Relevant Labs: 1/4/2024  Filled: 7/8/2024    #30 mL with 0 refills    Future Appointments   Date Time Provider Department Center   8/9/2024  9:10 AM Minh Velez MD [id number]   8/9/2024  2:15 PM Kyle De Anda DO [facility]   12/6/2024 10:40 AM Margy Bhatia APRN [facility]

## 2024-08-06 RX ORDER — AZELASTINE 1 MG/ML
1 SPRAY, METERED NASAL 2 TIMES DAILY
Qty: 90 ML | Refills: 0 | Status: SHIPPED | OUTPATIENT
Start: 2024-08-06

## 2024-08-08 ENCOUNTER — TELEPHONE (OUTPATIENT)
Dept: INTERNAL MEDICINE CLINIC | Facility: CLINIC | Age: 49
End: 2024-08-08

## 2024-08-09 ENCOUNTER — OFFICE VISIT (OUTPATIENT)
Facility: LOCATION | Age: 49
End: 2024-08-09
Payer: COMMERCIAL

## 2024-08-09 ENCOUNTER — OFFICE VISIT (OUTPATIENT)
Dept: SURGERY | Facility: CLINIC | Age: 49
End: 2024-08-09
Payer: COMMERCIAL

## 2024-08-09 DIAGNOSIS — J32.0 CHRONIC MAXILLARY SINUSITIS: Primary | ICD-10-CM

## 2024-08-09 DIAGNOSIS — M48.062 LUMBAR STENOSIS WITH NEUROGENIC CLAUDICATION: Primary | ICD-10-CM

## 2024-08-09 PROCEDURE — 99204 OFFICE O/P NEW MOD 45 MIN: CPT | Performed by: OTOLARYNGOLOGY

## 2024-08-09 PROCEDURE — 31231 NASAL ENDOSCOPY DX: CPT | Performed by: OTOLARYNGOLOGY

## 2024-08-09 NOTE — PROCEDURES
[facility]    BILATERAL LUMBAR TRANSFORAMINAL   NAME:  María Elena Hurtado    MR #:    [id number] :  1975     PHYSICIAN:  Kyle De Anda DO        Operative Report    DATE OF PROCEDURE: 2024   PREOPERATIVE DIAGNOSES: Lumbar stenosis   POSTOPERATIVE DIAGNOSES:   Same   PROCEDURES: Bilateral L5 transforaminal epidural steroid injections done under fluoroscopic guidance with contrast enhancement.   SURGEON: Kyle De Anda DO   ANESTHESIA: MAC for situational anxiety   INDICATIONS:      OPERATIVE PROCEDURE:  Written consent was obtained from the patient.  The patient was brought into the operating room and placed in the prone position on the fluoroscopy table with pillow underneath her abdomen.  The patient's skin was cleaned and draped in a normal sterile fashion.  Using AP fluoroscopy, all five lumbar vertebrae were identified.  When the fifth vertebra was identified, fluoroscopy was left anterior obliqued opening up the right L5-S1 intervertebral foramen.  At this point in time, the patient's skin was anesthetized with 1% PF lidocaine without epinephrine.  Then, a 5 inch, 22-gauge spinal needle was inserted and directed towards the right L5-S1 intervertebral foramen.  When it felt to be in good position, AP fluoroscopy was used to advance the needle to the 6 o'clock position on the right L5 pedicle.  At this point in time, Omnipaque-240 contrast was used to obtain a good epidurogram indicating correct needle placement.  Then, aspiration was performed.  No blood, fluid, or air was aspirated.  Then, the patient was injected with a 3 cc solution of 1.5 cc of 10 mg/cc of Dexamethasone and 1.5 cc of 1% PF lidocaine without epinephrine.  After this, the needle was removed.  Then  fluoroscopy was right anterior obliqued opening up the left L5-S1 intervertebral foramen.  At this point in time, the patient's skin was anesthetized with 1 to 2 cc of 1% PF lidocaine without epinephrine.  Then, a 5 inch,  22-gauge spinal needle was inserted and directed towards the left L5-S1 intervertebral foramen.  When it felt to be in good position, AP fluoroscopy was used to advance the needle to the 6 o'clock position on the left L5 pedicle.  At this point in time, Omnipaque-240 contrast was used to obtain a good epidurogram indicating correct needle placement.  Then, aspiration was performed.  No blood, fluid, or air was aspirated.  Then, the patient was injected with a 3 cc solution of 1.5 cc of 10 mg/cc of Dexamethasone and 1.5 cc of 1% PF lidocaine without epinephrine.  After this, the needle was removed.  The patient's skin was cleaned.  Band-Aids were applied.  The patient was transferred to the cart and into [facility].  The patient was given discharge instructions and will follow up in the clinic as scheduled.  Throughout the whole procedure, the patient's pulse oximetry and vital signs were monitored and they remained completely stable.  Also, throughout the whole procedure, prior to injection of any medication, aspiration was performed.  No blood, fluid, or air was aspirated at anytime.

## 2024-08-09 NOTE — PROGRESS NOTES
María Elena Hurtado is a 48 year old female.   Chief Complaint   Patient presents with    Sinus Problem     HPI:   She has a history of chronic sinusitis.  She has had sinus problems for years.  She gets 4 sinus infections each year.  She gets headache and pressure especially on the left-hand side over the maxillary sinus.  She has tried Zyrtec and Flonase without relief.  She has tried nasal saline irrigations and Sudafed but she cannot take Sudafed as it causes her blood pressure elevated.  She has also been given steroids.  Current Outpatient Medications   Medication Sig Dispense Refill    azelastine 0.1 % Nasal Solution 1 spray by Nasal route 2 (two) times daily. 90 mL 0    Tirzepatide-Weight Management (ZEPBOUND) 7.5 MG/0.5ML Subcutaneous Solution Auto-injector Inject 7.5 mg into the skin once a week for 4 doses. 2 mL 1    ibuprofen 400 MG Oral Tab Take 1 tablet (400 mg total) by mouth every 12 (twelve) hours as needed for Pain. (Patient not taking: Reported on 7/25/2024) 30 tablet 0    albuterol 108 (90 Base) MCG/ACT Inhalation Aero Soln Inhale 2 puffs into the lungs every 4 (four) hours as needed for Wheezing or Shortness of Breath. 3 each 2    CYCLOBENZAPRINE 10 MG Oral Tab TAKE 1 TABLET BY MOUTH EVERY DAY AT NIGHT AS NEEDED FOR MUSCLE SPASM 30 tablet 0    MELOXICAM 15 MG Oral Tab TAKE 1 TABLET (15 MG TOTAL) BY MOUTH DAILY WITH FOOD 30 tablet 0    NORTRIPTYLINE 10 MG Oral Cap TAKE 1 CAPSULE BY MOUTH EVERY DAY (Patient not taking: Reported on 7/25/2024) 90 capsule 0    semaglutide-weight management (WEGOVY) 2.4 MG/0.75ML Subcutaneous Solution Auto-injector Inject 0.75 mL (2.4 mg total) into the skin once a week. 9 mL 0    losartan 100 MG Oral Tab Take 1 tablet (100 mg total) by mouth daily. 90 tablet 3    cetirizine 10 MG Oral Tab Take 1 tablet (10 mg total) by mouth daily. 90 tablet 1    metoprolol succinate ER 25 MG Oral Tablet 24 Hr Take 0.5 tablets (12.5 mg total) by mouth daily. 45 tablet 3    FLUTICASONE  PROPIONATE 50 MCG/ACT Nasal Suspension SPRAY 2 SPRAYS BY NASAL ROUTE DAILY 48 mL 1    Olopatadine HCl (CVS OLOPATADINE HCL) 0.2 % Ophthalmic Solution Apply 1 drop to eye daily. INSTILL 1 DROP INTO AFFECTED EYE EVERY DAY 2.5 mL 5    montelukast 10 MG Oral Tab TAKE 1 TABLET BY MOUTH EVERY DAY AT NIGHT 90 tablet 3    ADVAIR DISKUS 250-50 MCG/ACT Inhalation Aerosol Powder, Breath Activated Inhale into the lungs 2 (two) times daily.      amLODIPine 5 MG Oral Tab Take 2 tablets (10 mg total) by mouth daily. 90 tablet 3    triamcinolone 0.1 % External Cream Apply topically 2 (two) times daily as needed. 60 g 0    aspirin 81 MG Oral Tab EC Take 1 tablet (81 mg total) by mouth daily. 30 tablet 0      Past Medical History:    Acute pancreatitis (HCC)    Allergic rhinitis    Arthritis    Asthma (HCC)    Bloating    Constipation    Decorative tattoo    Don't remember when    Diabetes (HCC)    Esophageal reflux    Essential hypertension    Flatulence/gas pain/belching    Unknown    Food intolerance    Milk give gas    Frequent use of laxatives    Heartburn    Acid reflux    Hemangioma    Hemorrhoids    Sometimes    Lymphadenopathy    Migraines    Night sweats    Hot flashes    Obesity    Osteoarthritis    Pain with bowel movements    Due to hemorrhoids    Sleep disturbance    Due to night sweats    Wears glasses      Social History:  Social History     Socioeconomic History    Marital status:    Tobacco Use    Smoking status: Never    Smokeless tobacco: Never   Vaping Use    Vaping status: Never Used   Substance and Sexual Activity    Alcohol use: Not Currently     Alcohol/week: 2.0 standard drinks of alcohol     Types: 2 Glasses of wine per week     Comment: Maybe every 2-3 months    Drug use: Never   Other Topics Concern    Caffeine Concern No    Exercise No    Seat Belt No    Special Diet No    Stress Concern Yes    Weight Concern Yes      Past Surgical History:   Procedure Laterality Date    Carpal tunnel release  Bilateral     Foot surgery Right 2022    Excision soft tissue hemangioma right foot    Hysterectomy      age 36    Knee surgery Right 2000    meniscus injury       1991, , 2009    Oophorectomy      age 36    Other surgical history  , ,     Hand surgery on both hands and knee surgery on my right knee    Total abdom hysterectomy           REVIEW OF SYSTEMS:   GENERAL HEALTH: feels well otherwise  GENERAL : denies fever, chills, sweats, weight loss, weight gain  SKIN: denies any unusual skin lesions or rashes  RESPIRATORY: denies shortness of breath with exertion  NEURO: denies headaches    EXAM:   LMP 2010     System Findings Details   Constitutional  Overall appearance - Normal.   Psychiatric  Orientation - Oriented to time, place, person & situation. Appropriate mood and affect.   Head/Face  Facial features -- Normal. Skull - Normal.   Eyes  Pupils equal ,round ,react to light and accomidate   Ears, Nose, Throat, Neck  Ears clear nose erythemic congestion or cavity clear pharynx cobblestoning neck no masses   Neurological  Memory - Normal. Cranial nerves - Cranial nerves II through XII grossly intact.   Lymph Detail  Submental. Submandibular. Anterior cervical. Posterior cervical. Supraclavicular.     Procedure:  Due to inability for adequate examination of the nasal cavity and nasopharynx and need for magnification to perform the examination, endoscopy was offered.  The nasal endoscope was utilized as it was imperative to inspect the interior of the nasal cavity and the middle and superior meatus along with the turbinates and sphenoethmoid recess.  Risks and benefits were discussed with patient/family and they have given consent to proceed. A rigid zero degree scope was inserted.    Findings:  The anterior of the nasal cavity along with the middle and superior meatus and turbinates and the sphenoethmoid recess were evaluated.  There is severe septal deviation to the left which is  impinging upon the middle meatus.  There is turbinate hypertrophy right greater than left.  There is erythematous mucosa.  ASSESSMENT AND PLAN:   1. Chronic maxillary sinusitis  CT [facility] was reviewed.  This shows some mucosal thickening in the right maxillary sinus.  Patient also has a deviated septum and in my opinion it is impinging upon the outflow area of the left maxillary sinus.  Given the chronicity of complaints and nonresponsive to maximal medical therapy is reasonable to proceed with an endoscopic sinus procedure.  We have discussed balloon sinuplasty versus formal endoscopic sinus surgery to include septoplasty.  We have discussed the risks and benefits of both.  She would like to think about this over the weekend and call me back with her decision regarding further treatment.      The patient indicates understanding of these issues and agrees to the plan.    No follow-ups on file.    Minh Velez MD  8/9/2024  11:36 AM

## 2024-08-12 ENCOUNTER — PATIENT MESSAGE (OUTPATIENT)
Dept: PHYSICAL MEDICINE AND REHAB | Facility: CLINIC | Age: 49
End: 2024-08-12

## 2024-08-15 ENCOUNTER — PATIENT MESSAGE (OUTPATIENT)
Facility: LOCATION | Age: 49
End: 2024-08-15

## 2024-08-15 NOTE — TELEPHONE ENCOUNTER
From: María Elena Hurtado  To: Kyle De Anda  Sent: 8/12/2024 5:36 PM CDT  Subject: Procedure done 8/9    Hello,  I had procedure done on Friday 8/9. I’m left message with surgical team. I’m still waiting for them to call back to tell me what to do. I’m having increase numbness and tingling in my lower extremities and pain. I’m now feeling soreness also in my back and neck and my hands. I have been swelling up. I have my feet elevated and I’m not sure what to do. I also began to have chills since Sunday. Please let me know.

## 2024-09-05 ENCOUNTER — PATIENT MESSAGE (OUTPATIENT)
Dept: INTERNAL MEDICINE CLINIC | Facility: CLINIC | Age: 49
End: 2024-09-05

## 2024-09-05 DIAGNOSIS — E66.9 OBESITY (BMI 30-39.9): ICD-10-CM

## 2024-09-05 DIAGNOSIS — I10 ESSENTIAL HYPERTENSION, BENIGN: Primary | ICD-10-CM

## 2024-09-09 RX ORDER — TIRZEPATIDE 10 MG/.5ML
10 INJECTION, SOLUTION SUBCUTANEOUS WEEKLY
Qty: 2 ML | Refills: 2 | Status: SHIPPED | OUTPATIENT
Start: 2024-09-09 | End: 2024-10-01

## 2024-09-09 NOTE — TELEPHONE ENCOUNTER
Requesting zepbound increase  LOV: 7/25/24  RTC: 4 months  Last Relevant Labs: na  Filled: 7/26/24 #2ml with 1 refills    Future Appointments   Date Time Provider Department Center   9/27/2024 10:30 AM Minh Velez MD [id number]   10/25/2024  9:20 AM Margy Bhatia APRN [facility]

## 2024-09-20 ENCOUNTER — TELEPHONE (OUTPATIENT)
Facility: LOCATION | Age: 49
End: 2024-09-20

## 2024-09-20 DIAGNOSIS — J32.0 CHRONIC MAXILLARY SINUSITIS: Primary | ICD-10-CM

## 2024-09-20 RX ORDER — ACETAMINOPHEN AND CODEINE PHOSPHATE 300; 30 MG/1; MG/1
TABLET ORAL
Qty: 10 TABLET | Refills: 0 | Status: SHIPPED | OUTPATIENT
Start: 2024-09-20

## 2024-09-20 RX ORDER — DIAZEPAM 5 MG
TABLET ORAL
Qty: 3 TABLET | Refills: 0 | Status: SHIPPED | OUTPATIENT
Start: 2024-09-20

## 2024-09-25 ENCOUNTER — TELEPHONE (OUTPATIENT)
Facility: LOCATION | Age: 49
End: 2024-09-25

## 2024-09-25 ENCOUNTER — PATIENT MESSAGE (OUTPATIENT)
Facility: LOCATION | Age: 49
End: 2024-09-25

## 2024-09-25 NOTE — TELEPHONE ENCOUNTER
Spoke to patient about taking medications at 9:30 but do not take Zepbound ask Dr Velez when it is alright to resume.arrival time 10:30 for procedure addison

## 2024-09-27 ENCOUNTER — OFFICE VISIT (OUTPATIENT)
Facility: LOCATION | Age: 49
End: 2024-09-27
Payer: COMMERCIAL

## 2024-09-27 ENCOUNTER — TELEPHONE (OUTPATIENT)
Facility: LOCATION | Age: 49
End: 2024-09-27

## 2024-09-27 DIAGNOSIS — J32.0 CHRONIC MAXILLARY SINUSITIS: Primary | ICD-10-CM

## 2024-09-27 PROCEDURE — 31296 NSL/SINS NDSC SURG FRNT SINS: CPT | Performed by: OTOLARYNGOLOGY

## 2024-09-27 RX ORDER — DOXYCYCLINE HYCLATE 100 MG
100 TABLET ORAL 2 TIMES DAILY
Qty: 20 TABLET | Refills: 0 | Status: SHIPPED | OUTPATIENT
Start: 2024-09-27

## 2024-09-27 NOTE — PROGRESS NOTES
Balloon sinuplasty operative report    Preoperative diagnosis:  chronic maxillary sinusitis    Postoperative diagnosis:  chronic maxillary sinusitis    Procedure: Bilateral sinus endoscopic balloon sinuplasty maxillary sinuses.    Surgeon: Minh Velez MD    Anesthetic: Local, in office procedure, site of service #11, 4ml    Operative report: Patient is brought into the treatment area in the office, he is topically anesthetized with anesthetic spray.  Using the sinus endoscope 1% Xylocaine with 1 to 100,000 units of epinephrine was injected into the middle turbinate bilaterally and the uncinate process bilaterally.  The 30 degree sinus scope was used with video apparatus, the Bravofly balloon system was used.    The balloon apparatus was then modified for the use in the maxillary sinus.  Again starting on the left side, using the 30 degree sinus scope.  The maxillary sinus seeker was used to locate the natural ostia of the maxillary sinus.  Then the balloon apparatus was placed directly through that site.  The lighted wire cable was advanced getting a good light pattern within the left maxillary sinus.  Then the balloon was advanced and then dilated to 12 PSI.  The balloon was then removed.  A similar procedure was performed on the opposite side.    Patient tolerated procedure well.  No complications minimal blood loss.    Routine post balloon instructions were given to the patient both verbally as well is a postop instruction sheet.  As per our usual patient was required to have a  and instructions were given to the  as well.

## 2024-10-03 DIAGNOSIS — M54.41 CHRONIC BILATERAL LOW BACK PAIN WITH BILATERAL SCIATICA: ICD-10-CM

## 2024-10-03 DIAGNOSIS — G89.29 CHRONIC BILATERAL LOW BACK PAIN WITH BILATERAL SCIATICA: ICD-10-CM

## 2024-10-03 DIAGNOSIS — M54.42 CHRONIC BILATERAL LOW BACK PAIN WITH BILATERAL SCIATICA: ICD-10-CM

## 2024-10-03 NOTE — TELEPHONE ENCOUNTER
Requesting    Name from pharmacy: FLUTICASONE PROP 50 MCG SPRAY         Will file in chart as: FLUTICASONE PROPIONATE 50 MCG/ACT Nasal Suspension    Sig: SPRAY 2 SPRAYS BY NASAL ROUTE DAILY    Original sig: SPRAY 2 SPRAYS BY NASAL ROUTE DAILY.    Disp: 48 mL    Refills: 1    Start: 10/3/2024    Class: Normal    Non-formulary    Last ordered: 1 year ago (8/4/2023) by Dai Slaughter MD    Last refill: 7/3/2024    Rx #: [id number]    Allergy Medication Protocol Quxenn61/03/2024 12:08 AM   Protocol Details In person appointment or virtual visit in the past 12 mos or appointment in next 3 mos      To be filled at: [facility]/pharmacy #[id number] - [place], [place] - [address], [phone], [phone]     LOV: 07/08/24 w/ RP   RTC: No Follow Up on File   Last Relevant Labs: 01/04/24  Future Appointments   Date Time Provider Department Center   10/11/2024  9:30 AM [id number]   10/17/2024  4:30 PM Minh Velez MD [id number]   10/25/2024  9:20 AM Margy Bhatia APRN [facility]

## 2024-10-04 RX ORDER — FLUTICASONE PROPIONATE 50 MCG
2 SPRAY, SUSPENSION (ML) NASAL DAILY
Qty: 48 ML | Refills: 1 | Status: SHIPPED | OUTPATIENT
Start: 2024-10-04

## 2024-10-04 RX ORDER — NORTRIPTYLINE HCL 10 MG
10 CAPSULE ORAL DAILY
Qty: 90 CAPSULE | Refills: 0 | OUTPATIENT
Start: 2024-10-04

## 2024-10-04 NOTE — TELEPHONE ENCOUNTER
Refill Request    Medication request: NORTRIPTYLINE 10 MG Oral Cap   TAKE 1 CAPSULE BY MOUTH EVERY DAY     LOV:7/31/2024 Kyle De Anda DO   Due back to clinic per last office note:  f/u for injection  NOV: 10/11/2024 Kyle De Anda DO      [place]: 7/03/2024 #90    Urine drug screen (if applicable): n/a  Pain contract: n/a    LOV plan (if weaning or changing medications): \"we can either do a trial of duloxetine given the longstanding symptoms, or do bilateral 5 transforaminal epidural steroid injection under fluoroscopy. We discussed the risks of procedure, including bleeding, infection, nerve injury, HA; elects to proceed with injection.  She defers duloxetine due to possible side effects, which I instructed her in general can be similar side effects to what she had with gabapentin and nortriptyline. \"           This medication is marked that patient requested removal, patient not taking on 7/25/2024      This Refill is DENIED -

## 2024-10-11 ENCOUNTER — OFFICE VISIT (OUTPATIENT)
Dept: PHYSICAL MEDICINE AND REHAB | Facility: CLINIC | Age: 49
End: 2024-10-11
Payer: COMMERCIAL

## 2024-10-11 DIAGNOSIS — M54.41 CHRONIC BILATERAL LOW BACK PAIN WITH BILATERAL SCIATICA: ICD-10-CM

## 2024-10-11 DIAGNOSIS — G89.29 CHRONIC BILATERAL LOW BACK PAIN WITH BILATERAL SCIATICA: ICD-10-CM

## 2024-10-11 DIAGNOSIS — M54.42 CHRONIC BILATERAL LOW BACK PAIN WITH BILATERAL SCIATICA: ICD-10-CM

## 2024-10-11 PROCEDURE — 99214 OFFICE O/P EST MOD 30 MIN: CPT | Performed by: PHYSICAL MEDICINE & REHABILITATION

## 2024-10-11 RX ORDER — DULOXETIN HYDROCHLORIDE 30 MG/1
30 CAPSULE, DELAYED RELEASE ORAL DAILY
Qty: 30 CAPSULE | Refills: 2 | Status: SHIPPED | OUTPATIENT
Start: 2024-10-11

## 2024-10-11 RX ORDER — CYCLOBENZAPRINE HCL 10 MG
10 TABLET ORAL NIGHTLY PRN
Qty: 30 TABLET | Refills: 0 | Status: SHIPPED | OUTPATIENT
Start: 2024-10-11

## 2024-10-11 NOTE — PROGRESS NOTES
Progress note    C/C:   Chief Complaint   Patient presents with    Follow - Up     Bilateral L5 transforaminal epidural steroid injections done under fluoroscopic guidance with contrast enhancement 8/9/24. Patient states she felt a 50% relief with injection. Pain 5/10. Denies weakness. Denies N/T. Takes Tylenol for pain with some relief.      HPI: 49-year-old female presents for follow-up.  She reports significant relief from bilateral L5 transforaminal epidural steroid injection under fluoroscopy done on 8/9/2024 as per above.  She continues to have back and bilateral knee pain, but not as painful. With prolonged sitting increasing tailbone pain, stiffness upon attempting to arise from a chair. No n/t in the legs or the feet. Has been trying to exercise, stretch; not painful. Walks on a treadmill, 10-15 minutes. She is here seeking further treatment for the ongoing symptoms. Following with weight loss clinic; was prescribed Zepbound.    Overall lower back pain has been present for approximately 1 year; hip and knee pain much longer.     Pertinent allergies: Allergies[1]     Physical exam:    Lumbar spine exam:    No skin rash lumbar/upper sacral region  + pain with lumbar flexion. + pain with lumbar extension  5/5 LE strength b/l  2/4 quad, gastrocs reflexes b/l    Imaging: No new imaging to review    Assessment and plan  Lumbar stenosis with neurogenic claudication  Obesity  HTN  asthma    Significant  reported improvements following bilateral L5 transforaminal epidural steroid injection, but still symptomatic and still limited.  We discussed her treatment options, which essentially includes a trial of duloxetine, for repeat bilateral L5 transforaminal epidural steroid injection while she works to lose weight and to increase her physical activity to further work on her tolerance to standing and walking tasks. She defers repeat injection and will start duloxetine 30mg qDaily. I should see her for follow up in 2-3  months.     Kyle De Anda DO  Physical Medicine and Rehabilitation  [facility]       [1]   Allergies  Allergen Reactions    Penicillins HIVES    Dust Mite Extract     Pollen Extract OTHER (SEE COMMENTS)    Amoxicillin-Pot Clavulanate ITCHING     rash    Seasonal ITCHING

## 2024-10-17 ENCOUNTER — OFFICE VISIT (OUTPATIENT)
Facility: LOCATION | Age: 49
End: 2024-10-17
Payer: COMMERCIAL

## 2024-10-17 DIAGNOSIS — J32.0 CHRONIC MAXILLARY SINUSITIS: Primary | ICD-10-CM

## 2024-10-17 PROCEDURE — 31237 NSL/SINS NDSC SURG BX POLYPC: CPT | Performed by: OTOLARYNGOLOGY

## 2024-10-17 RX ORDER — FLUTICASONE PROPIONATE 93 UG/1
1 SPRAY, METERED NASAL 2 TIMES DAILY
Qty: 1 EACH | Refills: 11 | Status: SHIPPED | OUTPATIENT
Start: 2024-10-17

## 2024-10-17 NOTE — PROGRESS NOTES
This patient is status post endoscopic sinus procedure on 9/26/2024.  In for her first postoperative debridement procedure.  Patient was topically anesthetized with anesthetic spray.  She was debrided using the 0 degree sinus scope bilaterally.  Patient tolerated the procedure well.  She was given  routine post debridement instructions.  She will follow-up still having some headaches.  She will try Xhance nasal spray along with continuing on saline and treating her allergies.  If she is not better she will call back and I will do dedicated CT sinus.  She is postop balloon sinuplasty of the maxillary sinuses.  She also has a deviated septum to the left.

## 2024-10-25 ENCOUNTER — TELEMEDICINE (OUTPATIENT)
Dept: INTERNAL MEDICINE CLINIC | Facility: CLINIC | Age: 49
End: 2024-10-25
Payer: COMMERCIAL

## 2024-10-25 DIAGNOSIS — Z51.81 THERAPEUTIC DRUG MONITORING: Primary | ICD-10-CM

## 2024-10-25 DIAGNOSIS — I10 ESSENTIAL HYPERTENSION, BENIGN: ICD-10-CM

## 2024-10-25 DIAGNOSIS — E66.9 OBESITY (BMI 30-39.9): ICD-10-CM

## 2024-10-25 DIAGNOSIS — R73.03 PREDIABETES: ICD-10-CM

## 2024-10-25 PROCEDURE — 99213 OFFICE O/P EST LOW 20 MIN: CPT | Performed by: NURSE PRACTITIONER

## 2024-10-25 RX ORDER — TIRZEPATIDE 12.5 MG/.5ML
12.5 INJECTION, SOLUTION SUBCUTANEOUS WEEKLY
Qty: 2 ML | Refills: 1 | Status: SHIPPED | OUTPATIENT
Start: 2024-10-25 | End: 2024-11-16

## 2024-10-25 NOTE — PATIENT INSTRUCTIONS
Next steps:  1.  Fill your prescribed medication and take as discussed and prescribed: zepbound 12.5mg weekly   2.  Schedule a personal nutrition consultation with one of our registered dieticians     Please try to work on the following dietary changes:    1.  Drink water with meals and throughout the day, cut down on soda and/or juice if consumed. Consider flavored water options like Bubbly, Spindrift, Hint and Naren.  2.  Eat breakfast daily and focus on having protein with each meal, examples include: greek yogurt, cottage cheese, hard boiled egg, whole grain toast with peanut butter.   3.  Reduce refined carbohydrates and sugars which includes items such as sweets, as well as rice, pasta, and bread and make sure to choose whole grain options when having them with just 1 serving per meal about the size of your inner palm.  4.  Consume non starchy veggies daily working towards making them a good 50% of your daily food intake. Add them to lunch and dinner consistently.  5.  Start a daily probiotic: VSL#3 is recommended, (order on line at www.vsl3.com). Take 1 capsule daily with water for 30 days, then reduce to 1 every other day (this will reduce the cost). Capsules can be left out for 2 weeks, but then must be refrigerated.      Please download stella My Fitness Pal, LoseIt! Or Net Diary to monitor daily dietary intake and you will be able to see if you are eating the right amount of calories or too much or too little which would hinder weight loss. Additionally this will help to see your daily carbohydrate and protein intake. When you set the stella up choose 1-2 lbs/week as a goal.  Keeping a paper food journal is an option as well to remain accountable for your choices- this is the start to mindful eating! A low calorie diet has been consistently shown to support weight loss.     Continue or start exercising to help establish a routine. If not already exercising begin with 1 day and progress as able with long-term  goal of 30 minutes 5 days a week at a minimum.     Meditation daily can help manage and control stress. Chronic stress can make weight loss difficult.  Exercising is one way to help with stress, but meditation using the CALM Leif or another comparable alternative can be done in your home or place of work with little time commitment. This Leif can also help work on behavior change and improve sleep. Check out the segment under Calm Masterclass and listen to The 4 Pillars of Health. A great way to begin learning about the foundation of lifestyle with practical tips to use in your every day.     Check out www.yourweightmatters.org blog for continued daily support and education along this weight loss journey!    Patient Resources:     Personal Training/Fitness Classes/Health Coaching     [facility] @ [email or web address] Full fitness center with group fitness and personal training. Discount available as client of [facility].  Health Coaching and Personal Training with Nikki Diallo at our [facility]- individual weekly coaching with option to add personal training and small group fitness classes targeted at weight loss- [phone] and/or email @ [email or web address]  [employer] [place] [email or web address]. Group Fitness [phone] and/or email America [email or web address]  FrancRh[facility]ed Fitness Centers with multiple locations: AuditFile (www.A[employer]UR[employer]), Eat The Whyd Fitness (www.FeZo.Taplet), Fit Body Bootcamp (www.Ticket HoybodybootZeligsoftp.Taplet), StageMark Fitness (www.Curiosityville), The Exercise  (www.exercisecoach.Taplet)     Online Fitness  Fitness  on Utube  Fit in 10 DVD series- www.wgluf56XYA.com  Sit and Be Fit - Chair exercise series Www.sitandbefit.org  Hip Hop Fit with Alonzo Thrasher at www.hiphopfit.net     Apps for on the Go Fitness  [place] 7 Minute Workout  (orange box with white 7) - free on the go HIIT training leif  Peloton Leif @ www.onepeloton.com     Nutrition Trackers and Tools  LoseIT! And My Fitness Pal apps and on line for tracking nutrition  NOOM - virtual health coaching  FitFoundation (healthy meals on the go) in [place] @ www.ykoxihdstqlnh5p.Atraverda  Bistro MD @ www.bistromd.com and Svhuzd87 (keto and low carb plans recommended) @ www.gawgng97.com, Metabolic Meals @ www.RRsatMetabolicMeals.com - individual prepared meals to go  Gobble, Blue Apron, Home , Every Plate, Sunbasket- on line meal delivery programs for preparation at home  Meal Village in [place] for homemade meals to go @ www.mealvillage.Atraverda  Diet Doctor @ www.dietdoctor.com - low carb swaps  Yummly - meal prep and planning leif (www.yummly.com)     Stress Management/Behavior/Mindful Eating  CALM meditation leif (www.calm.com)  Headspace  Am I Hungry? Mindful eating virtual  leif  Www.yourweightmatters.org - Obesity Action Coalition sponsored Blog posts daily  Motivation leif (black box with white \")- daily supportive messages sent to your phone     Books/Video Education/Podcasts  Mindless Eating by Galo Wilkinson  Why We Get Sick by Librado Lemons (a book about insulin resistance)  Atomic Habits by Khari Chang (a book about taking small steps to promote greater behavior change)   Can't Hurt Me by Davi Perry (a book exploring the power of discipline in achieving your goals)  The End of Dieting: How to Live for Life by Dr. Eliel Hussein M.D. or listen to The Kuliza Podcast Episode 63: Understanding \"Nutritarian\" Eating w/Dr. Eliel Hussein  Your Body in Balance: The New Science of Food, Hormones, and Health by Dr. Mckay Plascencia  The Menopause Diet Plan by Kirsty Nichols and Elyse Cobos  The Complete Guide to fasting by Dr. Omalley  Sugar, Salt & Fat by Mary Harrison, Ph.D, R.D.  Weight Loss Surgery Will Not Treat Food Addiction by Cheryl Larsen Ph.D  The Game Changers- NetLum[employer]  Documentary on plant based nutrition  Fed Up - documentary about obesity (Free on Utube)  The Truth About Sugar - documentary on sugar (Free on Utube, https://youtu.be/1T6ozcfYR3w)  The Dr. Baird T5 Wellness Plan by Dr. Lex Baird MD  Fitlosophy Fitspiration - journal to better health (found at [employer] in fitness aisle)  What Happened to You?- a look at the impact trauma has on behavior written by Bharti Washington and Dr. Norris Patel  Whole Again by Gab Stewart - discovering your true self after trauma  Ishmael Lanza talk on [employer], The Call to Courage  Podcasts: The Exam Room by the Physician's Committee, Nutrition Facts by Dr. Claros    We are here to support you with weight loss, but please remember that you still need your primary care provider for your routine health maintenance.

## 2024-10-25 NOTE — PROGRESS NOTES
[facility] WEIGHT MANAGEMENT VIRTUAL ENCOUNTER     María Elena Hurtado verbally consents to a Virtual/Telephone Check-In service on 10/25/24   Patient understands and accepts financial responsibility for any deductible, co-insurance and/or co-pays associated with this service.    HISTORY OF PRESENT ILLNESS  Chief Complaint   Patient presents with    Other     F/u on weight management      María Elena Hurtado is a 49 year old female is being evaluated as a video visit using Telemedicine with live, interactive video and audio    Weight gain/loss since LOV based on home monitoring:   Home scale: #207  Has gained #4 lbs since LOV 3 months ago     Compliance with medication: zepbound 10mg weekly   Tolerating well, doesn't feel like it's helping with decreasing appetite and no side effects     Is doing ok, feels like she needs to increase zepbound   Still struggling with sleep   Exercise/Activity: treadmill 4 days per week (30 min), strength training 3 days per week   Nutrition: eating regular meals, +protein, minimal veggies. not tracking reports  Stress is manageable   Sleep: 4-5 hours/night, waking up feeling tired at times (hot flashes, night sweats)     Denies chest pain, shortness of breath, dizziness, blurred vision, headache, paresthesia, nausea/vomiting.     Wt Readings from Last 6 Encounters:   07/31/24 203 lb (92.1 kg)   07/25/24 203 lb (92.1 kg)   07/08/24 200 lb 3.2 oz (90.8 kg)   07/02/24 195 lb (88.5 kg)   06/24/24 198 lb (89.8 kg)   03/08/24 195 lb (88.5 kg)          Subjective  REVIEW OF SYSTEMS  GENERAL HEALTH: feels well otherwise, denied any fevers chills or night sweats   RESPIRATORY: denies shortness of breath   CARDIOVASCULAR: denies chest pain  GI: denies abdominal pain  PSYCH: denies any mood changes    Objective  EXAM  Reviewed most recent set of vitals   Physical Exam:  GENERAL: well developed, well nourished, in no apparent distress, speaking in full sentences comfortably   SKIN: warm, pink, dry  without rashes to exposed area   EYES: conjunctiva pink  HEENT: atraumatic, normocephalic  LUNGS: normal work of breathing, non labored  CARDIO: normal work, no exertion  EXTREMITIES: no cyanosis, no clubbing, no edema  NEURO: Oriented times three  PSYCH: pleasant, cooperative, normal mood and affect    Lab Results   Component Value Date    WBC 6.6 01/04/2024    RBC 4.26 01/04/2024    HGB 12.9 01/04/2024    HCT 39.3 01/04/2024    MCV 92.3 01/04/2024    MCH 30.3 01/04/2024    MCHC 32.8 01/04/2024    RDW 13.7 01/04/2024    .0 01/04/2024     Lab Results   Component Value Date    GLU 75 01/04/2024    BUN 15 01/04/2024    BUNCREA 8.8 (L) 12/08/2019    CREATSERUM 0.91 01/04/2024    ANIONGAP 3 01/04/2024    GFR 78 09/20/2016    GFRNAA 63 05/28/2022    GFRAA 73 05/28/2022    CA 9.2 01/04/2024    OSMOCALC 292 01/04/2024    ALKPHO 61 01/04/2024    AST 6 (L) 01/04/2024    ALT 15 01/04/2024    BILT 0.3 01/04/2024    TP 7.1 01/04/2024    ALB 3.9 01/04/2024    GLOBULIN 3.2 01/04/2024     01/04/2024    K 4.1 01/04/2024     01/04/2024    CO2 30.0 01/04/2024     Lab Results   Component Value Date     08/07/2023    A1C 5.9 (H) 08/07/2023     Lab Results   Component Value Date    CHOLEST 184 08/07/2023    TRIG 76 08/07/2023    HDL 52 08/07/2023     (H) 08/07/2023    VLDL 13 08/07/2023    NONHDLC 132 (H) 08/07/2023     Lab Results   Component Value Date    TSH 1.280 08/07/2023     No results found for: \"B12\", \"VITB12\"  Lab Results   Component Value Date    VITD 22.3 (L) 08/07/2023       Medications Ordered Prior to Encounter[1]    ASSESSMENT  Analyzed weight data:       Diagnoses and all orders for this visit:    Therapeutic drug monitoring    Obesity (BMI 30-39.9)    Essential hypertension, benign    Prediabetes        PLAN  Will increase medications: zepbound 12.5mg weekly  --advised of side effects and adverse effects of this medication  Contradictions: has done phentermine in the past, ozempic,  rybelsus, wegovy    Reviewed labs  HTN  stable, follows with PCP  prediabetes, reviewed last a1c 5.9% on 8/2023  Joint pain, encouraged aqua therapy  Interm. Snoring, can think about possibly doing a sleep study in the future   Wrote out macros and encouraged tracking food   Advised to monitor blood pressure and pulse at home/ given parameters to review and contact provider.  Nutrition: low carb diet/ recommended to eat breakfast daily/ regular protein intake  Follow up with dietitian and psychologist as recommended.  Discussed the role of sleep and stress in weight management.  Counseled on comprehensive weight loss plan including attention to nutrition, exercise and behavior/stress management for success. See patient instruction below for more details.  Discussed strategies to overcome barriers to successful weight loss and weight maintenance  FITTE: ACSM recommendations (150-300 minutes/ week in active weight loss)       There are no Patient Instructions on file for this visit.    No follow-ups on file.    Patient verbalizes understanding.    Total time spent on chart review, pre-charting, obtaining history, counseling, and educating, reviewing labs was 21 minutes.       Pt understands phone/video evaluation is not a substitute for face to face examination or emergency care. Pt advised to go to the ER or call 911 for worsening symptoms or acute distress.       Please note that the following visit was completed using two-way, real-time interactive audio and/or video communication.  This has been done in good ashwini to provide continuity of care in the best interest of the provider-patient relationship, due to the ongoing public health crisis/national emergency and because of restrictions of visitation.  There are limitations of this visit as no physical exam could be performed.  Every conscious effort was taken to allow for sufficient and adequate time.  This billing was spent on reviewing labs, medications,  radiology tests and decision making.  Appropriate medical decision-making and tests are ordered as detailed in the plan of care above.     NOTE TO PATIENT: The 21st Century Cures Act makes clinical notes like these available to patients in the interest of transparency. Clinical notes are medical documents used by physicians and care providers to communicate with each other. These documents include medical language and terminology, abbreviations, and treatment information that may sound technical and at times possibly unfamiliar. In addition, at times, the verbiage may appear blunt or direct. These documents are one tool providers use to communicate relevant information and clinical opinions of the care providers in a way that allows common understanding of the clinical context.     Margy Bhatia, APRN  10/25/2024         [1]   Current Outpatient Medications on File Prior to Visit   Medication Sig Dispense Refill    Fluticasone Propionate (XHANCE) 93 MCG/ACT Nasal Exhaler Suspension 1 spray by Nasal route 2 (two) times daily. 1 each 11    cyclobenzaprine 10 MG Oral Tab Take 1 tablet (10 mg total) by mouth nightly as needed for Muscle spasms. 30 tablet 0    DULoxetine 30 MG Oral Cap DR Particles Take 1 capsule (30 mg total) by mouth daily. 30 capsule 2    FLUTICASONE PROPIONATE 50 MCG/ACT Nasal Suspension SPRAY 2 SPRAYS BY NASAL ROUTE DAILY. 48 mL 1    Doxycycline Hyclate 100 MG Oral Tab Take 1 tablet (100 mg total) by mouth 2 (two) times daily. 20 tablet 0    azelastine 0.1 % Nasal Solution 1 spray by Nasal route 2 (two) times daily. 90 mL 0    albuterol 108 (90 Base) MCG/ACT Inhalation Aero Soln Inhale 2 puffs into the lungs every 4 (four) hours as needed for Wheezing or Shortness of Breath. 3 each 2    semaglutide-weight management (WEGOVY) 2.4 MG/0.75ML Subcutaneous Solution Auto-injector Inject 0.75 mL (2.4 mg total) into the skin once a week. 9 mL 0    losartan 100 MG Oral Tab Take 1 tablet (100 mg total) by  mouth daily. 90 tablet 3    cetirizine 10 MG Oral Tab Take 1 tablet (10 mg total) by mouth daily. 90 tablet 1    metoprolol succinate ER 25 MG Oral Tablet 24 Hr Take 0.5 tablets (12.5 mg total) by mouth daily. 45 tablet 3    Olopatadine HCl ([facility] OLOPATADINE HCL) 0.2 % Ophthalmic Solution Apply 1 drop to eye daily. INSTILL 1 DROP INTO AFFECTED EYE EVERY DAY 2.5 mL 5    montelukast 10 MG Oral Tab TAKE 1 TABLET BY MOUTH EVERY DAY AT NIGHT 90 tablet 3    ADVAIR DISKUS 250-50 MCG/ACT Inhalation Aerosol Powder, Breath Activated Inhale into the lungs 2 (two) times daily.      amLODIPine 5 MG Oral Tab Take 2 tablets (10 mg total) by mouth daily. 90 tablet 3    triamcinolone 0.1 % External Cream Apply topically 2 (two) times daily as needed. 60 g 0    aspirin 81 MG Oral Tab EC Take 1 tablet (81 mg total) by mouth daily. 30 tablet 0     No current facility-administered medications on file prior to visit.

## 2024-11-05 ENCOUNTER — LAB ENCOUNTER (OUTPATIENT)
Dept: LAB | Age: 49
End: 2024-11-05
Attending: STUDENT IN AN ORGANIZED HEALTH CARE EDUCATION/TRAINING PROGRAM
Payer: COMMERCIAL

## 2024-11-05 DIAGNOSIS — Z79.899 NEED FOR PROPHYLACTIC CHEMOTHERAPY: Primary | ICD-10-CM

## 2024-11-05 LAB
ALBUMIN SERPL-MCNC: 4.7 G/DL (ref 3.2–4.8)
ALBUMIN/GLOB SERPL: 1.6 {RATIO} (ref 1–2)
ALP LIVER SERPL-CCNC: 88 U/L
ALT SERPL-CCNC: 18 U/L
ANION GAP SERPL CALC-SCNC: 6 MMOL/L (ref 0–18)
AST SERPL-CCNC: 26 U/L (ref ?–34)
BILIRUB SERPL-MCNC: 0.4 MG/DL (ref 0.3–1.2)
BUN BLD-MCNC: 11 MG/DL (ref 9–23)
CALCIUM BLD-MCNC: 10.1 MG/DL (ref 8.7–10.4)
CHLORIDE SERPL-SCNC: 103 MMOL/L (ref 98–112)
CO2 SERPL-SCNC: 31 MMOL/L (ref 21–32)
CREAT BLD-MCNC: 1.2 MG/DL
EGFRCR SERPLBLD CKD-EPI 2021: 55 ML/MIN/1.73M2 (ref 60–?)
FASTING STATUS PATIENT QL REPORTED: NO
GLOBULIN PLAS-MCNC: 3 G/DL (ref 2–3.5)
GLUCOSE BLD-MCNC: 100 MG/DL (ref 70–99)
OSMOLALITY SERPL CALC.SUM OF ELEC: 289 MOSM/KG (ref 275–295)
POTASSIUM SERPL-SCNC: 3.8 MMOL/L (ref 3.5–5.1)
PROT SERPL-MCNC: 7.7 G/DL (ref 5.7–8.2)
SODIUM SERPL-SCNC: 140 MMOL/L (ref 136–145)

## 2024-11-05 PROCEDURE — 36415 COLL VENOUS BLD VENIPUNCTURE: CPT

## 2024-11-05 PROCEDURE — 80053 COMPREHEN METABOLIC PANEL: CPT

## 2024-11-21 ENCOUNTER — OFFICE VISIT (OUTPATIENT)
Facility: LOCATION | Age: 49
End: 2024-11-21
Payer: COMMERCIAL

## 2024-11-21 DIAGNOSIS — J32.0 CHRONIC MAXILLARY SINUSITIS: Primary | ICD-10-CM

## 2024-11-21 PROCEDURE — 99213 OFFICE O/P EST LOW 20 MIN: CPT | Performed by: OTOLARYNGOLOGY

## 2024-11-21 RX ORDER — CEFUROXIME AXETIL 250 MG/1
250 TABLET ORAL 2 TIMES DAILY
Qty: 20 TABLET | Refills: 0 | Status: SHIPPED | OUTPATIENT
Start: 2024-11-21

## 2024-11-21 RX ORDER — PREDNISONE 5 MG/1
5 TABLET ORAL DAILY
Qty: 7 TABLET | Refills: 0 | Status: SHIPPED | OUTPATIENT
Start: 2024-11-21

## 2024-11-21 NOTE — PROGRESS NOTES
María Elena Hurtado is a 49 year old female.   Chief Complaint   Patient presents with    Post-Op     HPI:   She is postop balloon sinuplasty in September.  Her headaches have gotten better.  However the last few days she has had some congestion and postnasal drip    REVIEW OF SYSTEMS:   GENERAL HEALTH: feels well otherwise  GENERAL : denies fever, chills, sweats, weight loss, weight gain  SKIN: denies any unusual skin lesions or rashes  RESPIRATORY: denies shortness of breath with exertion  NEURO: denies headaches    EXAM:   [facility] 09/20/2010     System Findings Details   Constitutional  Overall appearance - Normal.   Psychiatric  Orientation - Oriented to time, place, person & situation. Appropriate mood and affect.   Head/Face  Facial features -- Normal. Skull - Normal.   Eyes  Pupils equal ,round ,react to light and accomidate   Ears, Nose, Throat, Neck  Ears clear nose erythema congestion oropharynx erythema   Neurological  Memory - Normal. Cranial nerves - Cranial nerves II through XII grossly intact.   Lymph Detail  Submental. Submandibular. Anterior cervical. Posterior cervical. Supraclavicular.       ASSESSMENT AND PLAN:   1. Chronic maxillary sinusitis  Status post balloon sinuplasty in September.  She has had an exacerbation of infection.  She will take Ceftin and prednisone and see me back as needed.      The patient indicates understanding of these issues and agrees to the plan.    No follow-ups on file.    Minh Velez MD  11/21/2024  5:39 PM

## 2024-12-19 ENCOUNTER — HOSPITAL ENCOUNTER (OUTPATIENT)
Age: 49
Discharge: HOME | End: 2024-12-19
Payer: COMMERCIAL

## 2024-12-19 ENCOUNTER — APPOINTMENT (OUTPATIENT)
Dept: GENERAL RADIOLOGY | Age: 49
End: 2024-12-19
Attending: PHYSICIAN ASSISTANT
Payer: COMMERCIAL

## 2024-12-19 VITALS
BODY MASS INDEX: 33.56 KG/M2 | TEMPERATURE: 99 F | OXYGEN SATURATION: 96 % | WEIGHT: 199 LBS | DIASTOLIC BLOOD PRESSURE: 96 MMHG | HEIGHT: 64.75 IN | HEART RATE: 97 BPM | RESPIRATION RATE: 18 BRPM | SYSTOLIC BLOOD PRESSURE: 155 MMHG

## 2024-12-19 DIAGNOSIS — S93.509A SPRAIN OF TOE, INITIAL ENCOUNTER: Primary | ICD-10-CM

## 2024-12-19 PROCEDURE — 99213 OFFICE O/P EST LOW 20 MIN: CPT

## 2024-12-19 PROCEDURE — 73660 X-RAY EXAM OF TOE(S): CPT | Performed by: PHYSICIAN ASSISTANT

## 2024-12-19 RX ORDER — IBUPROFEN 600 MG/1
600 TABLET, FILM COATED ORAL ONCE
Status: COMPLETED | OUTPATIENT
Start: 2024-12-19 | End: 2024-12-19

## 2024-12-19 RX ORDER — NAPROXEN 500 MG/1
500 TABLET ORAL 2 TIMES DAILY PRN
Qty: 14 TABLET | Refills: 0 | Status: SHIPPED | OUTPATIENT
Start: 2024-12-19 | End: 2024-12-26

## 2024-12-19 NOTE — ED PROVIDER NOTES
Patient Seen in: [facility]      History     Chief Complaint   Patient presents with    Leg or Foot Injury     Stated Complaint: Stubbed Lt big toe    Subjective:   HPI      Patient states she was walking around at home barefoot yesterday and stubbed her left great toe on her 's weight on the ground.  She complains of pain to the toe ever since then.  She is able to bear weight but with pain.  Denies weakness/paresthesias.  Denies any other complaints or concerns at this time.    Objective:     Past Medical History:    Acute pancreatitis (HCC)    Allergic rhinitis    Arthritis    Asthma (HCC)    Bloating    Constipation    Decorative tattoo    Don't remember when    Diabetes (HCC)    Esophageal reflux    Essential hypertension    Flatulence/gas pain/belching    Unknown    Food intolerance    Milk give gas    Frequent use of laxatives    Heartburn    Acid reflux    Hemangioma    Hemorrhoids    Sometimes    Lymphadenopathy    Migraines    Night sweats    Hot flashes    Obesity    Osteoarthritis    Pain with bowel movements    Due to hemorrhoids    Sleep disturbance    Due to night sweats    Wears glasses              Past Surgical History:   Procedure Laterality Date    Carpal tunnel release Bilateral     Foot surgery Right 2022    Excision soft tissue hemangioma right foot    Hysterectomy      age 36    Knee surgery Right 2000    meniscus injury       1991, , 2009    Oophorectomy      age 36    Other surgical history  , , 2019    Hand surgery on both hands and knee surgery on my right knee    Total abdom hysterectomy  2010                Social History     Socioeconomic History    Marital status:    Tobacco Use    Smoking status: Never    Smokeless tobacco: Never   Vaping Use    Vaping status: Never Used   Substance and Sexual Activity    Alcohol use: Not Currently     Alcohol/week: 2.0 standard drinks of alcohol     Types: 2 Glasses of wine per week     Comment:  Maybe every 2-3 months    Drug use: Never   Other Topics Concern    Caffeine Concern No    Exercise No    Seat Belt No    Special Diet No    Stress Concern Yes    Weight Concern Yes              Review of Systems    Positive for stated complaint: Stubbed Lt big toe  Other systems are as noted in HPI.  Constitutional and vital signs reviewed.      All other systems reviewed and negative except as noted above.    Physical Exam     ED Triage Vitals [12/19/24 1002]   BP (!) 155/96   Pulse 97   Resp 18   Temp 98.8 °F (37.1 °C)   Temp src Oral   SpO2 96 %   O2 Device None (Room air)       Current Vitals:   Vital Signs  BP: -- (patient declined repeat vital signs)  Pulse: 97  Resp: 18  Temp: 98.8 °F (37.1 °C)  Temp src: Oral    Oxygen Therapy  SpO2: 96 %  O2 Device: None (Room air)        Physical Exam  Vitals and nursing note reviewed.   Constitutional:       Appearance: Normal appearance.   Eyes:      Conjunctiva/sclera: Conjunctivae normal.   Pulmonary:      Effort: Pulmonary effort is normal.   Musculoskeletal:      Comments: Left great toe: There is diffuse tenderness noted to the toe.  No obvious deformity.  Mild swelling.  No ecchymosis.  No erythema/warmth.   Skin:     General: Skin is warm and dry.   Neurological:      General: No focal deficit present.      Mental Status: She is alert.   Psychiatric:         Mood and Affect: Mood normal.             ED Course   Labs Reviewed - No data to display         XR TOE(S) (MIN 2 VIEWS), LEFT 1ST (CPT=73660)    Result Date: 12/19/2024  PROCEDURE:  XR TOE(S) (MIN 2 VIEWS), LEFT 1ST (CPT=73660)  TECHNIQUE:  Three views were obtained.  COMPARISON:  None.  INDICATIONS:  Stubbed Lt big toe  PATIENT STATED HISTORY: (As transcribed by Technologist)  Patient states she stubbed her great toe on a piece of furniture last night.               CONCLUSION:  Hypertrophic spurring involving the base of the distal phalanx of the left great toe and the base of the proximal phalanx of the  left great toe.  No acute fracture or dislocation.  There is joint space narrowing with sclerosis involving the 1st MTP joint.   LOCATION:  [facility]   Dictated by (CST): Mariann Yan MD on 12/19/2024 at 11:04 AM     Finalized by (CST): Mariann Yan MD on 12/19/2024 at 11:05 AM          I have reviewed xray results and see no obvious fracture.         MDM      Differential diagnosis includes but is not limited to fracture, strain/sprain, contusion.    Patient well-appearing, nontoxic.  Discussed x-rays shows no obvious fracture.  Discussed plan to discharge home with NSAIDs.  Offered post op shoe and she agreed.  Offered crutches but she declined.  Work note provided to patient.  I advised RICE and other supportive care at home, follow up and provided return precautions.  Patient verbalized understanding/agreement of plan.          Medical Decision Making      Disposition and Plan     Clinical Impression:  1. Sprain of toe, initial encounter         Disposition:  Discharge  12/19/2024 11:46 am    Follow-up:  Dai Lees MD  [address]  [facility] [id number] [phone]    In 3 days      Gutierrez Cyr DPM  [address]  [facility] [address] [phone]      As needed          Medications Prescribed:  Discharge Medication List as of 12/19/2024 11:53 AM        START taking these medications    Details   naproxen 500 MG Oral Tab Take 1 tablet (500 mg total) by mouth 2 (two) times daily as needed., Normal, Disp-14 tablet, R-0                 Supplementary Documentation:

## 2024-12-29 DIAGNOSIS — Z51.81 THERAPEUTIC DRUG MONITORING: ICD-10-CM

## 2024-12-29 DIAGNOSIS — E66.9 OBESITY (BMI 30-39.9): ICD-10-CM

## 2024-12-30 NOTE — TELEPHONE ENCOUNTER
Requesting increase to 15  Requested Prescriptions     Pending Prescriptions Disp Refills    Tirzepatide-Weight Management (ZEPBOUND) 15 MG/0.5ML Subcutaneous Solution Auto-injector 2 mL 2     Sig: Inject 15 mg into the skin once a week.     Signed Prescriptions Disp Refills    Tirzepatide-Weight Management (ZEPBOUND) 12.5 MG/0.5ML Subcutaneous Solution Auto-injector 2 mL 1     Sig: Inject 12.5 mg into the skin once a week for 4 doses.     Authorizing Provider: STELLA RICKS       LOV: 10/25/2024  RTC: n/a  Filled: 10/25/2024 #2ml with 1 refills    Future Appointments   Date Time Provider Department Center   1/8/2025 10:15 AM [facility] De [id number]     I’m doing good my weight is 195. Please increase the dose.

## 2024-12-31 RX ORDER — TIRZEPATIDE 12.5 MG/.5ML
12.5 INJECTION, SOLUTION SUBCUTANEOUS WEEKLY
Qty: 2 ML | Refills: 1 | Status: SHIPPED | OUTPATIENT
Start: 2024-12-31 | End: 2025-01-22

## 2025-01-02 ENCOUNTER — PATIENT MESSAGE (OUTPATIENT)
Dept: INTERNAL MEDICINE CLINIC | Facility: CLINIC | Age: 50
End: 2025-01-02

## 2025-01-02 RX ORDER — TIRZEPATIDE 15 MG/.5ML
15 INJECTION, SOLUTION SUBCUTANEOUS WEEKLY
Qty: 2 ML | Refills: 2 | Status: SHIPPED | OUTPATIENT
Start: 2025-01-02

## 2025-01-07 RX ORDER — MONTELUKAST SODIUM 10 MG/1
10 TABLET ORAL NIGHTLY
Qty: 90 TABLET | Refills: 0 | Status: SHIPPED | OUTPATIENT
Start: 2025-01-07

## 2025-01-07 NOTE — TELEPHONE ENCOUNTER
Requesting    Name from pharmacy: MONTELUKAST SOD 10 MG TABLET         Will file in chart as: MONTELUKAST 10 MG Oral Tab    Sig: TAKE 1 TABLET BY MOUTH EVERY DAY AT NIGHT    Disp: 90 tablet    Refills: 3    Start: 1/6/2025    Class: Normal    Non-formulary    Last ordered: 1 year ago (8/4/2023) by Dai Slaughter MD    Last refill: 10/3/2024    Rx #: [id number]    Asthma & COPD Medication Protocol [id number]/06/2025 12:05 AM   Protocol Details ACT Score greater than or equal to 20    Appointment in past 6 or next 3 months    ACT recorded in the last 12 months      [id number] [place], [place] [facility] [address], [phone], [phone]        LOV: 07/08/24 w/ RP  RTC: No Follow Up on File   Last Relevant Labs: 01/04/24    Future Appointments   Date Time Provider Department Center   1/8/2025 10:15 AM [id number]

## 2025-01-08 ENCOUNTER — OFFICE VISIT (OUTPATIENT)
Dept: PHYSICAL MEDICINE AND REHAB | Facility: CLINIC | Age: 50
End: 2025-01-08
Payer: COMMERCIAL

## 2025-01-08 VITALS — BODY MASS INDEX: 32.49 KG/M2 | HEIGHT: 65 IN | WEIGHT: 195 LBS

## 2025-01-08 DIAGNOSIS — M54.42 CHRONIC BILATERAL LOW BACK PAIN WITH BILATERAL SCIATICA: ICD-10-CM

## 2025-01-08 DIAGNOSIS — M48.062 LUMBAR STENOSIS WITH NEUROGENIC CLAUDICATION: Primary | ICD-10-CM

## 2025-01-08 DIAGNOSIS — M54.41 CHRONIC BILATERAL LOW BACK PAIN WITH BILATERAL SCIATICA: ICD-10-CM

## 2025-01-08 DIAGNOSIS — I10 ESSENTIAL HYPERTENSION, BENIGN: ICD-10-CM

## 2025-01-08 DIAGNOSIS — G89.29 CHRONIC BILATERAL LOW BACK PAIN WITH BILATERAL SCIATICA: ICD-10-CM

## 2025-01-08 DIAGNOSIS — Z91.09 ENVIRONMENTAL ALLERGIES: ICD-10-CM

## 2025-01-08 PROCEDURE — 99214 OFFICE O/P EST MOD 30 MIN: CPT | Performed by: PHYSICAL MEDICINE & REHABILITATION

## 2025-01-08 PROCEDURE — 3008F BODY MASS INDEX DOCD: CPT | Performed by: PHYSICAL MEDICINE & REHABILITATION

## 2025-01-08 RX ORDER — DOXYCYCLINE HYCLATE 100 MG
100 TABLET ORAL 2 TIMES DAILY
Qty: 20 TABLET | Refills: 0 | OUTPATIENT
Start: 2025-01-08

## 2025-01-08 RX ORDER — CYCLOBENZAPRINE HCL 10 MG
10 TABLET ORAL NIGHTLY PRN
Qty: 30 TABLET | Refills: 0 | Status: SHIPPED | OUTPATIENT
Start: 2025-01-08

## 2025-01-08 RX ORDER — DULOXETIN HYDROCHLORIDE 30 MG/1
60 CAPSULE, DELAYED RELEASE ORAL DAILY
Qty: 60 CAPSULE | Refills: 0 | Status: SHIPPED | OUTPATIENT
Start: 2025-01-08

## 2025-01-08 RX ORDER — CEFUROXIME AXETIL 250 MG/1
250 TABLET ORAL 2 TIMES DAILY
Qty: 20 TABLET | Refills: 0 | OUTPATIENT
Start: 2025-01-08

## 2025-01-08 NOTE — PROGRESS NOTES
Progress note    C/C:   Chief Complaint   Patient presents with    Follow - Up     LOV 10/11/24 for low back pain. Pt presents with L sided low back pain 7/10 radiating into thigh, calf. Denies N/T. Denies weakness. Pt takes cyclobenzaprine and duloxetine daily when she has them as well as tylenol arthritis prn with some improvement. No recent PT.      HPI: 49-year-old female presents for follow-up.  On last visit I started her on duloxetine 30 mg daily for residual low back and leg pain following bilateral L5 transforaminal epidural steroid injection on 8/9/2024. Has had increasing left lower back and leg pain; feels as though the muscles are going to go into spasms. Right side not nearly as bothersome. No side effects from the medication. Has lost weight since last visit. Can walk 10 minutes on treadmill before needing to stop. No side effects from duloxetine, but she is also not sure if the medication is making a difference. No associated numbness or tingling in the legs.    Pertinent allergies: Allergies[1]     Physical exam:  Ht 65\"   Wt 195 lb (88.5 kg)   LMP 09/20/2010   BMI 32.45 kg/m²      Lumbar spine exam:    No skin rash lumbar/upper sacral region  No pain with lumbar flexion. + pain with lumbar extension.  No tenderness to palpation bilateral lumbar paraspinals. No ttp bilateral PSIS.  5/5 LE strength b/l  SILT b/l LE  2/4 quad, gastrocs reflexes b/l  Facet loading + b/l  Seated slump test negative  SLR + left, negative right    Imaging: no new imaging to review    Assessment and plan  Lumbar radiculitis  Lumbar stenosis  Obesity, following with weight loss clinic, with weight loss  HTN  Asthma    We discussed treatment options, which includes physical therapy for neutral to flexion lumbar stabilization, increasing the duloxetine to 60 mg daily, or repeating a left or bilateral L5 transforaminal epidural steroid injection under fluoroscopy.  She elects to increase the duloxetine and continue home  exercise program.  Can also consider acupuncture.  She will let me know how she is doing in 4 weeks.     Kyle De Anda DO  Physical Medicine and Rehabilitation  [facility]       [1]   Allergies  Allergen Reactions    Penicillins HIVES    Dust Mite Extract     Pollen Extract OTHER (SEE COMMENTS)    Amoxicillin-Pot Clavulanate ITCHING     rash    Seasonal ITCHING

## 2025-01-08 NOTE — TELEPHONE ENCOUNTER
Requesting    Name from pharmacy: CETIRIZINE HCL 10 MG TABLET         Will file in chart as: CETIRIZINE 10 MG Oral Tab    Sig: TAKE 1 TABLET BY MOUTH EVERY DAY    Disp: 90 tablet    Refills: 1    Start: 1/8/2025    Class: Normal    Non-formulary For: Environmental allergies    Last ordered: 8 months ago (4/16/2024) by Dai Slaughter MD    Last refill: 7/22/2024    Rx #: [id number]    Allergy Medication Protocol [id number]/08/2025 10:28 AM   Protocol Details In person appointment or virtual visit in the past 12 mos or appointment in next 3 mos    Medication is active on med list      To be filled at: [id number] - [place], [place] [address], [phone], [phone]     LOV: 07/08/24 w/ RP  RTC: Follow up with Dr. Carlton as needed/scheduled.   Last Relevant Labs: 01/04/24  No future appointments.

## 2025-01-08 NOTE — TELEPHONE ENCOUNTER
Refill Request    11/5/24 GFR- 55 failed protocol    Medication request: cyclobenzaprine 10 MG Oral Tab  Take 1 tablet (10 mg total) by mouth nightly as needed for Muscle spasms.     LOV:1/8/2025 Kyle De Anda,    Due back to clinic per last office note:  \"She will let me know how she is doing in 4 weeks. \"  NOV: Visit date not found      [place]: 10/11/24 #30 - 30 day supply    Urine drug screen (if applicable): n/a  Pain contract: n/a    LOV plan (if weaning or changing medications): No mention at office visit today.

## 2025-01-08 NOTE — PATIENT INSTRUCTIONS
1) increase the duloxetine to 60mg daily. If it gives you side effects go back to 30mg.   2) Consider acupuncture; you may call and see if this is covered by your insurance.  3) Could consider physical therapy, or left or both side epidural steroid injection.

## 2025-01-09 RX ORDER — AMLODIPINE BESYLATE 5 MG/1
10 TABLET ORAL DAILY
Qty: 90 TABLET | Refills: 0 | Status: SHIPPED | OUTPATIENT
Start: 2025-01-09 | End: 2025-02-05

## 2025-01-09 RX ORDER — CETIRIZINE HYDROCHLORIDE 10 MG/1
10 TABLET ORAL DAILY
Qty: 90 TABLET | Refills: 1 | OUTPATIENT
Start: 2025-01-09

## 2025-01-09 NOTE — TELEPHONE ENCOUNTER
Patient is completely out of medication.   Requesting    amLODIPine 5 MG Oral Tab         Sig: Take 2 tablets (10 mg total) by mouth daily.    Disp: 90 tablet    Refills: 3    Start: 1/8/2025    Class: Normal    For: Essential hypertension, benign    Last ordered: 1 year ago (4/5/2023) by Dai Slaughter MD    Patient comment: I don’t have any more refills and I don’t have any medication left.    Hypertension Medications Protocol [id number]/08/2025 08:30 PM   Protocol Details Last BP reading less than 140/90    CMP or BMP in past 12 months    In person appointment or virtual visit in the past 12 mos or appointment in next 3 mos    EGFRCR or GFRAA > 50    Medication is active on med list      To be filled at: [facility]pharmacy [id number] [place], [place] [address] [facility] [address] [facility], [phone], [phone]     LOV: 07/08/24 w/ RP  RTC: Follow Up PRN per RP  Last Relevant Labs: 01/2024  No future appointments.

## 2025-02-05 DIAGNOSIS — M54.42 CHRONIC BILATERAL LOW BACK PAIN WITH BILATERAL SCIATICA: ICD-10-CM

## 2025-02-05 DIAGNOSIS — G89.29 CHRONIC BILATERAL LOW BACK PAIN WITH BILATERAL SCIATICA: ICD-10-CM

## 2025-02-05 DIAGNOSIS — M54.41 CHRONIC BILATERAL LOW BACK PAIN WITH BILATERAL SCIATICA: ICD-10-CM

## 2025-02-05 DIAGNOSIS — I10 ESSENTIAL HYPERTENSION, BENIGN: ICD-10-CM

## 2025-02-05 NOTE — TELEPHONE ENCOUNTER
Fax received from pharmacy requesting 90 day supply of Duloxetine 30MG.     Refill Request    LAST GFR: 11/05/2024; 55. PROTOCOL FAILED.     Medication request: DULoxetine 30 MG Oral Cap DR Particles. Take 2 capsules (60 mg total) by mouth daily.     LOV: 1/8/2025 Kyle De Anda, DO   Due back to clinic per last office note: Per Dr. De Anda: \"She will let me know how she is doing in 4 weeks.\"  NOV: Visit date not found      [place] 01/08/2025 #60    Urine drug screen (if applicable): n/a  Pain contract: n/a    LOV plan (if weaning or changing medications): Per Dr. De Anda: \"increase the duloxetine to 60mg daily. If it gives you side effects go back to 30mg.\"        Order pended for 90 day supply of Duloxetine and routed to Dr. De Anda.

## 2025-02-06 RX ORDER — DULOXETIN HYDROCHLORIDE 30 MG/1
60 CAPSULE, DELAYED RELEASE ORAL DAILY
Qty: 180 CAPSULE | Refills: 0 | Status: SHIPPED | OUTPATIENT
Start: 2025-02-06

## 2025-02-06 RX ORDER — AMLODIPINE BESYLATE 5 MG/1
10 TABLET ORAL DAILY
Qty: 180 TABLET | Refills: 0 | Status: SHIPPED | OUTPATIENT
Start: 2025-02-06

## 2025-02-06 NOTE — TELEPHONE ENCOUNTER
Requesting    amLODIPine 5 MG Oral Tab         Sig: Take 2 tablets (10 mg total) by mouth daily.    Disp: 180 tablet    Refills: 0    Start: 2/5/2025    Class: Normal    For: Essential hypertension, benign    Last ordered: 4 weeks ago (1/9/2025) by Dai Slaughter MD    Hypertension Medications Protocol [place]3502/05/2025 04:45 PM   Protocol Details Last BP reading less than 140/90    CMP or BMP in past 12 months    In person appointment or virtual visit in the past 12 mos or appointment in next 3 mos    EGFRCR or GFRAA > 50    Medication is active on med list      To be filled at: [facility]/pharmacy #[id number] - [place] [id number] [facility], [phone], [phone]     LOV: 07/08/24 w/ MAR  RTC: No Follow Up on File   Last Relevant Labs: 01/04/24    Future Appointments   Date Time Provider Department Center   2/12/2025 11:40 AM Margy Bhatia APRN [facility]

## 2025-02-12 ENCOUNTER — OFFICE VISIT (OUTPATIENT)
Dept: INTERNAL MEDICINE CLINIC | Facility: CLINIC | Age: 50
End: 2025-02-12
Payer: COMMERCIAL

## 2025-02-12 VITALS
SYSTOLIC BLOOD PRESSURE: 120 MMHG | BODY MASS INDEX: 34.31 KG/M2 | HEART RATE: 98 BPM | DIASTOLIC BLOOD PRESSURE: 78 MMHG | HEIGHT: 64 IN | RESPIRATION RATE: 16 BRPM | WEIGHT: 201 LBS

## 2025-02-12 DIAGNOSIS — E66.9 OBESITY (BMI 30-39.9): ICD-10-CM

## 2025-02-12 DIAGNOSIS — Z51.81 THERAPEUTIC DRUG MONITORING: Primary | ICD-10-CM

## 2025-02-12 DIAGNOSIS — I10 ESSENTIAL HYPERTENSION, BENIGN: ICD-10-CM

## 2025-02-12 DIAGNOSIS — R73.03 PREDIABETES: ICD-10-CM

## 2025-02-12 PROCEDURE — 3008F BODY MASS INDEX DOCD: CPT | Performed by: NURSE PRACTITIONER

## 2025-02-12 PROCEDURE — 3074F SYST BP LT 130 MM HG: CPT | Performed by: NURSE PRACTITIONER

## 2025-02-12 PROCEDURE — 99214 OFFICE O/P EST MOD 30 MIN: CPT | Performed by: NURSE PRACTITIONER

## 2025-02-12 PROCEDURE — 3078F DIAST BP <80 MM HG: CPT | Performed by: NURSE PRACTITIONER

## 2025-02-12 NOTE — PROGRESS NOTES
HISTORY OF PRESENT ILLNESS  Chief Complaint   Patient presents with    Weight Check     Down 6     María Elena Hurtado is a 49 year old female here for follow up with medical weight loss program for the treatment of overweight, obesity, or morbid obesity.     Down 6 lbs (f/u from 10/2024 via telemedicine)   Compliant on zepbound 15mg weekly   Tolerating well, helping with decreasing appetite and no side effects     Success: when I lose pounds  Challenging: it's hard to want to eat after shot  Still meeting with pain specialist   Exercise/Activity: treadmill 6 days per week (30 min), strength training 3 days per week   Nutrition: eating regular meals, +protein, minimal veggies. not tracking reports  Meals out per week on average: 1  Stress is manageable   Sleep: 4 hours/night, waking up feeling tired (hot flashes and joint pain)     Denies chest pain, shortness of breath, dizziness, blurred vision, headache, paresthesia, nausea/vomiting.     Breakfast Lunch Dinner Snacks Fluids   Reviewed              Wt Readings from Last 6 Encounters:   02/12/25 201 lb (91.2 kg)   01/08/25 195 lb (88.5 kg)   12/19/24 199 lb (90.3 kg)   07/31/24 203 lb (92.1 kg)   07/25/24 203 lb (92.1 kg)   07/08/24 200 lb 3.2 oz (90.8 kg)          REVIEW OF SYSTEMS  GENERAL: feels well otherwise, denied any fevers chills or night sweats   LUNGS: denies shortness of breath  CARDIOVASCULAR: denies chest pain  GI: denies abdominal pain  MUSCULOSKELETAL: +back pain, +joint pains   PSYCH: denies change in behavior or mood, denies feeling sad or depressed    EXAM  /78   Pulse 98   Resp 16   Ht 5' 4\" (1.626 m)   Wt 201 lb (91.2 kg)   LMP 09/20/2010   BMI 34.50 kg/m²       GENERAL: well developed, well nourished, in no apparent distress, A/O x3  SKIN: no rashes, no suspicious lesions  HEENT: atraumatic, normocephalic, OP-clear, PERRLA  NECK: supple, no adenopathy  LUNGS: CTA in all fields, breathing non labored  CARDIO: RRR without murmur  GI:  +BS, NT/ND, no masses or HSM  EXTREMITIES: no cyanosis, no clubbing, no edema    Lab Results   Component Value Date     (H) 11/05/2024    BUN 11 11/05/2024    BUNCREA 8.8 (L) 12/08/2019    CREATSERUM 1.20 (H) 11/05/2024    ANIONGAP 6 11/05/2024    GFR 78 09/20/2016    GFRNAA 63 05/28/2022    GFRAA 73 05/28/2022    CA 10.1 11/05/2024    OSMOCALC 289 11/05/2024    ALKPHO 88 11/05/2024    AST 26 11/05/2024    ALT 18 11/05/2024    BILT 0.4 11/05/2024    TP 7.7 11/05/2024    ALB 4.7 11/05/2024    GLOBULIN 3.0 11/05/2024     11/05/2024    K 3.8 11/05/2024     11/05/2024    CO2 31.0 11/05/2024     Lab Results   Component Value Date     08/07/2023    A1C 5.9 (H) 08/07/2023     Lab Results   Component Value Date    CHOLEST 184 08/07/2023    TRIG 76 08/07/2023    HDL 52 08/07/2023     (H) 08/07/2023    VLDL 13 08/07/2023    NONHDLC 132 (H) 08/07/2023     No results found for: \"B12\", \"VITB12\"  Lab Results   Component Value Date    VITD 22.3 (L) 08/07/2023       Medications Ordered Prior to Encounter[1]    ASSESSMENT/PLAN    ICD-10-CM    1. Therapeutic drug monitoring  Z51.81       2. Obesity (BMI 30-39.9)  E66.9       3. Essential hypertension, benign  I10       4. Prediabetes  R73.03       5. Constipation, unspecified constipation type  K59.00           PLAN   Initial Weight Data and Goal Weight Loss:  Initial consult: #215 lbs on 1/2023  Weight Calculations  Initial Weight: 215 lbs  Initial Weight Date: 01/02/23  Today's Weight: 201 lbs  5% Goal: 10.75  10% Goal: 21.5  Total Weight Loss: 14 lbs  Total weight loss: Down 6 lbs total, Net loss 14 lbs  Continue with medications: zepbound 15mg weekly   --advised of side effects and adverse effects of this medication  Contradictions:  has done phentermine in the past, ozempic, rybelsus, wegovy    Reviewed labs- encouraged to get A1c repeated with PCP   HTN  stable, follows with PCP  prediabetes, reviewed last a1c 5.9% on 8/2023  Joint pain,  encouraged aqua therapy (with last stlela)  Interm. Snoring, can think about possibly doing a sleep study in the future   Wrote out macros and encouraged tracking food   Nutrition: Low carb diet, recommended to eat breakfast daily/ regular protein intake  Follow up with dietitian and psychologist as recommended.  Discussed the role of sleep and stress in weight management.  Counseled on comprehensive weight loss plan including attention to nutrition, exercise and behavior/stress management for success. See patient instruction below for more details.  Discussed strategies to overcome barriers to successful weight loss and weight maintenance  FITTE: ACSM recommendations (150-300 minutes/ week in active weight loss)   Weight Loss Consent to treat reviewed and signed.    Total time spent on chart review, pre-charting, obtaining history, counseling, and educating, reviewing labs was 30 minutes.       NOTE TO PATIENT: The 21st Century Cures Act makes clinical notes like these available to patients in the interest of transparency. Clinical notes are medical documents used by physicians and care providers to communicate with each other. These documents include medical language and terminology, abbreviations, and treatment information that may sound technical and at times possibly unfamiliar. In addition, at times, the verbiage may appear blunt or direct. These documents are one tool providers use to communicate relevant information and clinical opinions of the care providers in a way that allows common understanding of the clinical context.     There are no Patient Instructions on file for this visit.    No follow-ups on file.    Patient verbalizes understanding.    MJ Peterson             [1]   Current Outpatient Medications on File Prior to Visit   Medication Sig Dispense Refill    DULoxetine 30 MG Oral Cap DR Particles Take 2 capsules (60 mg total) by mouth daily. 180 capsule 0    amLODIPine 5 MG Oral Tab Take 2  tablets (10 mg total) by mouth daily. 180 tablet 0    CYCLOBENZAPRINE 10 MG Oral Tab TAKE 1 TABLET BY MOUTH EVERY DAY NIGHTLY AS NEEDED FOR MUSCLE SPASMS 30 tablet 0    montelukast 10 MG Oral Tab Take 1 tablet (10 mg total) by mouth nightly. 90 tablet 0    Tirzepatide-Weight Management (ZEPBOUND) 15 MG/0.5ML Subcutaneous Solution Auto-injector Inject 15 mg into the skin once a week. 2 mL 2    cefuroxime 250 MG Oral Tab Take 1 tablet (250 mg total) by mouth 2 (two) times daily. 20 tablet 0    Fluticasone Propionate (XHANCE) 93 MCG/ACT Nasal Exhaler Suspension 1 spray by Nasal route 2 (two) times daily. 1 each 11    FLUTICASONE PROPIONATE 50 MCG/ACT Nasal Suspension SPRAY 2 SPRAYS BY NASAL ROUTE DAILY. 48 mL 1    Doxycycline Hyclate 100 MG Oral Tab Take 1 tablet (100 mg total) by mouth 2 (two) times daily. 20 tablet 0    azelastine 0.1 % Nasal Solution 1 spray by Nasal route 2 (two) times daily. 90 mL 0    albuterol 108 (90 Base) MCG/ACT Inhalation Aero Soln Inhale 2 puffs into the lungs every 4 (four) hours as needed for Wheezing or Shortness of Breath. 3 each 2    losartan 100 MG Oral Tab Take 1 tablet (100 mg total) by mouth daily. 90 tablet 3    cetirizine 10 MG Oral Tab Take 1 tablet (10 mg total) by mouth daily. 90 tablet 1    Olopatadine HCl ([employer]S OLOPATADINE HCL) 0.2 % Ophthalmic Solution Apply 1 drop to eye daily. INSTILL 1 DROP INTO AFFECTED EYE EVERY DAY 2.5 mL 5    ADVAIR DISKUS 250-50 MCG/ACT Inhalation Aerosol Powder, Breath Activated Inhale into the lungs 2 (two) times daily.      triamcinolone 0.1 % External Cream Apply topically 2 (two) times daily as needed. 60 g 0    aspirin 81 MG Oral Tab EC Take 1 tablet (81 mg total) by mouth daily. 30 tablet 0     No current facility-administered medications on file prior to visit.

## 2025-02-27 DIAGNOSIS — G89.29 CHRONIC BILATERAL LOW BACK PAIN WITH BILATERAL SCIATICA: ICD-10-CM

## 2025-02-27 DIAGNOSIS — M54.42 CHRONIC BILATERAL LOW BACK PAIN WITH BILATERAL SCIATICA: ICD-10-CM

## 2025-02-27 DIAGNOSIS — M54.41 CHRONIC BILATERAL LOW BACK PAIN WITH BILATERAL SCIATICA: ICD-10-CM

## 2025-02-28 DIAGNOSIS — Z91.09 ENVIRONMENTAL ALLERGIES: ICD-10-CM

## 2025-02-28 RX ORDER — CYCLOBENZAPRINE HCL 10 MG
10 TABLET ORAL NIGHTLY PRN
Qty: 30 TABLET | Refills: 0 | Status: SHIPPED | OUTPATIENT
Start: 2025-02-28

## 2025-02-28 NOTE — TELEPHONE ENCOUNTER
Refill Request    LAST GFR: 11/5/24; 55. PROTOCOL failed    Medication request:   Requested Prescriptions     Pending Prescriptions Disp Refills    CYCLOBENZAPRINE 10 MG Oral Tab [Pharmacy Med Name: CYCLOBENZAPRINE 10 MG TABLET] 30 tablet 0     Sig: TAKE 1 TABLET BY MOUTH EVERY DAY AT NIGHT AS NEEDED FOR MUSCLE SPASM         LOV:1/8/2025 Kyle De Anda, DO   Due back to clinic per last office note:  \"She will let me know how she is doing in 4 weeks\"  NOV: Visit date not found      [place]: 1/8/25 #30 (30 days)    Urine drug screen (if applicable): n/a  Pain contract: n/a    LOV plan (if weaning or changing medications): not mentioned    Routed to Dr. De Anda.

## 2025-03-01 NOTE — TELEPHONE ENCOUNTER
Protocol passed     LOV: 7/8/24   RTC: 6 months  Filled: 4/16/24  Future Appointments   Date Time Provider Department Center   4/2/2025  4:20 PM Dai Lees MD [facility] [place]   5/6/2025 11:40 AM Margy Bhatia APRN [facility]   9/17/2025  2:20 PM Margy Bhatia APRN [facility]

## 2025-03-02 RX ORDER — CETIRIZINE HYDROCHLORIDE 10 MG/1
10 TABLET ORAL DAILY
Qty: 90 TABLET | Refills: 0 | Status: SHIPPED | OUTPATIENT
Start: 2025-03-02

## 2025-03-27 RX ORDER — TIRZEPATIDE 15 MG/.5ML
INJECTION, SOLUTION SUBCUTANEOUS
Qty: 2 ML | Refills: 2 | OUTPATIENT
Start: 2025-03-27

## 2025-03-27 RX ORDER — TIRZEPATIDE 15 MG/.5ML
15 INJECTION, SOLUTION SUBCUTANEOUS WEEKLY
Qty: 2 ML | Refills: 3 | Status: SHIPPED | OUTPATIENT
Start: 2025-03-27

## 2025-03-27 NOTE — TELEPHONE ENCOUNTER
Requesting   Requested Prescriptions     Refused Prescriptions Disp Refills    ZEPBOUND 15 MG/0.5ML Subcutaneous Solution Auto-injector [Pharmacy Med Name: ZEPBOUND 15MG/0.5ML INJ (4 PF PENS)] 2 mL 2     Sig: ADMINISTER 15 MG UNDER THE SKIN 1 TIME A WEEK      LOV: 02/12/2025  RTC: 3 months  Filled: 01/02/2025 #15 mg with 2 refills    Future Appointments   Date Time Provider Department Center   4/2/2025  4:20 PM Dai Lees MD [facility] [place]   5/6/2025 11:40 AM Margy Bhatia APRN [facility] [address]   9/17/2025  2:20 PM Margy Bhatia APRN [facility]

## 2025-03-27 NOTE — TELEPHONE ENCOUNTER
Requesting   Requested Prescriptions     Pending Prescriptions Disp Refills    Tirzepatide-Weight Management (ZEPBOUND) 15 MG/0.5ML Subcutaneous Solution Auto-injector 2 mL 2     Sig: Inject 15 mg into the skin once a week.      LOV: [id number]  RTC: 3 months  Filled: 01/02/2025  2 mL#15 mg with 2 refills    Future Appointments   Date Time Provider Department Center   4/2/2025  4:20 PM Dai Lees MD [facility] [place]   5/6/2025 11:40 AM Margy Bhatia APRN [facility]   9/17/2025  2:20 PM Margy Bhatia APRN [facility]

## 2025-04-02 ENCOUNTER — OFFICE VISIT (OUTPATIENT)
Dept: INTERNAL MEDICINE CLINIC | Facility: CLINIC | Age: 50
End: 2025-04-02
Payer: COMMERCIAL

## 2025-04-02 VITALS
BODY MASS INDEX: 33.39 KG/M2 | WEIGHT: 198 LBS | OXYGEN SATURATION: 97 % | HEART RATE: 74 BPM | DIASTOLIC BLOOD PRESSURE: 74 MMHG | TEMPERATURE: 97 F | HEIGHT: 64.5 IN | SYSTOLIC BLOOD PRESSURE: 140 MMHG

## 2025-04-02 DIAGNOSIS — Z01.89 ROUTINE LAB DRAW: ICD-10-CM

## 2025-04-02 DIAGNOSIS — N95.1 VASOMOTOR SYMPTOMS DUE TO MENOPAUSE: ICD-10-CM

## 2025-04-02 DIAGNOSIS — Z12.31 VISIT FOR SCREENING MAMMOGRAM: ICD-10-CM

## 2025-04-02 DIAGNOSIS — I10 HYPERTENSION, UNSPECIFIED TYPE: Primary | ICD-10-CM

## 2025-04-02 DIAGNOSIS — R06.09 DOE (DYSPNEA ON EXERTION): ICD-10-CM

## 2025-04-02 PROCEDURE — 3008F BODY MASS INDEX DOCD: CPT | Performed by: INTERNAL MEDICINE

## 2025-04-02 PROCEDURE — 3078F DIAST BP <80 MM HG: CPT | Performed by: INTERNAL MEDICINE

## 2025-04-02 PROCEDURE — 99214 OFFICE O/P EST MOD 30 MIN: CPT | Performed by: INTERNAL MEDICINE

## 2025-04-02 PROCEDURE — 3077F SYST BP >= 140 MM HG: CPT | Performed by: INTERNAL MEDICINE

## 2025-04-02 RX ORDER — SPIRONOLACTONE 100 MG/1
100 TABLET, FILM COATED ORAL DAILY
COMMUNITY
Start: 2025-02-26

## 2025-04-02 RX ORDER — METOPROLOL SUCCINATE 25 MG/1
12.5 TABLET, EXTENDED RELEASE ORAL DAILY
COMMUNITY
Start: 2025-01-08

## 2025-04-02 NOTE — PROGRESS NOTES
Subjective:   María Elena Hurtado is a 49 year old female  who presents for Blood Pressure (Follow Up )       The following individual(s) verbally consented to be recorded using ambient AI listening technology and understand that they can each withdraw their consent to this listening technology at any point by asking the clinician to turn off or pause the recording:    Patient name: María Elena Hurtado        History of Present Illness  The patient, with a history of hypertension, presents for a follow-up visit. She reports that her blood pressure has been fluctuating, with readings typically around 120/70, but occasionally rising to 145. These episodes of elevated blood pressure occur approximately twice a month. The patient also experiences heart palpitations during these episodes at times.   In addition to her hypertension, the patient has been experiencing hot flashes, which she describes as starting in her chest and rising to her head, making her feel like she is on fire. These symptoms have been ongoing since she underwent a hysterectomy in 2009. The patient is currently on duloxetine for nerve pain in her back, which she reports has helped with mood swings but not with the hot flashes.    The patient also reports a growing cyst on her hand, which does not cause pain but is a source of discomfort due to its size. She has previously seen a doctor for this issue. To follow-up with ortho hand     History/Other:    Chief Complaint Reviewed and Verified  Nursing Notes Reviewed and   Verified  Tobacco Reviewed  Allergies Reviewed  Medications Reviewed    Problem List Reviewed  Medical History Reviewed  Surgical History   Reviewed  OB Status Reviewed  Family History Reviewed         Current Outpatient Medications   Medication Sig Dispense Refill    metoprolol succinate ER 25 MG Oral Tablet 24 Hr Take 0.5 tablets (12.5 mg total) by mouth daily.      spironolactone 100 MG Oral Tab Take 1 tablet (100 mg total) by  mouth daily.      Tirzepatide-Weight Management (ZEPBOUND) 15 MG/0.5ML Subcutaneous Solution Auto-injector Inject 15 mg into the skin once a week. 2 mL 3    cetirizine 10 MG Oral Tab Take 1 tablet (10 mg total) by mouth daily. 90 tablet 0    CYCLOBENZAPRINE 10 MG Oral Tab TAKE 1 TABLET BY MOUTH EVERY DAY AT NIGHT AS NEEDED FOR MUSCLE SPASM 30 tablet 0    DULoxetine 30 MG Oral Cap DR Particles Take 2 capsules (60 mg total) by mouth daily. 180 capsule 0    amLODIPine 5 MG Oral Tab Take 2 tablets (10 mg total) by mouth daily. 180 tablet 0    montelukast 10 MG Oral Tab Take 1 tablet (10 mg total) by mouth nightly. 90 tablet 0    FLUTICASONE PROPIONATE 50 MCG/ACT Nasal Suspension SPRAY 2 SPRAYS BY NASAL ROUTE DAILY. 48 mL 1    Doxycycline Hyclate 100 MG Oral Tab Take 1 tablet (100 mg total) by mouth 2 (two) times daily. 20 tablet 0    albuterol 108 (90 Base) MCG/ACT Inhalation Aero Soln Inhale 2 puffs into the lungs every 4 (four) hours as needed for Wheezing or Shortness of Breath. 3 each 2    losartan 100 MG Oral Tab Take 1 tablet (100 mg total) by mouth daily. 90 tablet 3    Olopatadine HCl ([employer]S OLOPATADINE HCL) 0.2 % Ophthalmic Solution Apply 1 drop to eye daily. INSTILL 1 DROP INTO AFFECTED EYE EVERY DAY 2.5 mL 5    triamcinolone 0.1 % External Cream Apply topically 2 (two) times daily as needed. 60 g 0       Review of Systems:  Pertinent items are noted in HPI.  A comprehensive 10 point review of systems was completed.  Pertinent positives and negatives noted in the the HPI.        Objective:   /74 (BP Location: Left arm, Patient Position: Sitting, Cuff Size: adult)   Pulse 74   Temp 97.4 °F (36.3 °C) (Temporal)   Ht 5' 4.5\" (1.638 m)   Wt 198 lb (89.8 kg)   LMP 09/20/2010   SpO2 97%   BMI 33.46 kg/m²  Estimated body mass index is 33.46 kg/m² as calculated from the following:    Height as of this encounter: 5' 4.5\" (1.638 m).    Weight as of this encounter: 198 lb (89.8 kg).    Physical Exam  GENERAL:  Alert, cooperative, no acute distress.  CHEST: Clear to auscultation bilaterally, no wheezes, rhonchi, or crackles.  CARDIOVASCULAR: Normal heart rate and rhythm, S1 and S2 normal without murmurs.  EXTREMITIES: No edema.  NEUROLOGICAL: Cranial nerves grossly intact    Assessment & Plan:     Assessment & Plan  Hypertension  Intermittent blood pressure elevations with palpitations suggest need for regular metoprolol use.  - Initiate metoprolol half tablet daily.  -continue losartan, amlodipine, spironolactone   - Order EKG and routine blood work including electrolytes.  - Consider heart monitor if symptoms persist.  - Discuss with dermatologist regarding alternative acne treatments.    Menopausal symptoms  Persistent hot flashes post-hysterectomy, unrelieved by duloxetine.  Gabapentin not tolerated.  - Refer to gynecologist     SALVADOR- stress test, cxr, cardiology referral   Close follow-up     Cyst on hand  Growing cyst on hand, non-painful   - Follow up with hand specialist for evaluation and potential removal.    Follow-up  Follow-up needed to assess treatment response and evaluate symptoms. Cardiology referral if symptoms persist despite normal Angel scan.  - Schedule follow-up appointment in 4-6 weeks.  - Order mammogram, fasting labs, and L[place]y scan.  - Provide cardiology referral.      This note was created utilizing [employer] speech recognition software which may lead to grammatical errors/typos.   If any word or phrase is confusing, it is likely due to recognition error. Please ask the provider for clarification.      Dai Slaughter MD

## 2025-04-06 ENCOUNTER — PATIENT MESSAGE (OUTPATIENT)
Dept: INTERNAL MEDICINE CLINIC | Facility: CLINIC | Age: 50
End: 2025-04-06

## 2025-04-07 RX ORDER — MONTELUKAST SODIUM 10 MG/1
10 TABLET ORAL NIGHTLY
Qty: 90 TABLET | Refills: 3 | Status: SHIPPED | OUTPATIENT
Start: 2025-04-07

## 2025-04-07 NOTE — TELEPHONE ENCOUNTER
Requesting    Name from pharmacy: MONTELUKAST SOD 10 MG TABLET         Will file in chart as: MONTELUKAST 10 MG Oral Tab    Sig: TAKE 1 TABLET BY MOUTH EVERY DAY AT NIGHT    Disp: 90 tablet    Refills: 0 (Pharmacy requested: Not specified)    Start: 4/5/2025    Class: Normal    Non-formulary    Last ordered: 3 months ago (1/7/2025) by Dai Slaughter MD    Last refill: 1/7/2025    Rx #: [id number]    Asthma & COPD Medication Protocol [id number]/05/2025 06:49 AM   Protocol Details ACT Score greater than or equal to 20    ACT recorded in the last 12 months    Appointment in past 6 or next 3 months    Medication is active on med list      To be filled at: [facility]/pharmacy #[id number] [facility] [address] [facility] [address] AT [facility], [phone], [phone]     LOV: 04/02/25 w/ DVF  RTC: 04/30/25  Last Relevant Labs: 01/2024    Future Appointments   Date Time Provider Department Center   5/6/2025 11:40 AM Margy Bhatia APRN [facility]   5/14/2025  1:20 PM Dai Lees MD [facility]   9/17/2025  2:20 PM Margy Bhatia APRN [facility]

## 2025-04-09 ENCOUNTER — LAB ENCOUNTER (OUTPATIENT)
Dept: LAB | Age: 50
End: 2025-04-09
Attending: INTERNAL MEDICINE
Payer: COMMERCIAL

## 2025-04-09 ENCOUNTER — HOSPITAL ENCOUNTER (OUTPATIENT)
Dept: GENERAL RADIOLOGY | Age: 50
Discharge: HOME | End: 2025-04-09
Attending: INTERNAL MEDICINE
Payer: COMMERCIAL

## 2025-04-09 ENCOUNTER — EKG ENCOUNTER (OUTPATIENT)
Dept: LAB | Age: 50
End: 2025-04-09
Attending: INTERNAL MEDICINE
Payer: COMMERCIAL

## 2025-04-09 DIAGNOSIS — I10 HYPERTENSION, UNSPECIFIED TYPE: ICD-10-CM

## 2025-04-09 DIAGNOSIS — R06.09 DOE (DYSPNEA ON EXERTION): ICD-10-CM

## 2025-04-09 DIAGNOSIS — Z01.89 ROUTINE LAB DRAW: ICD-10-CM

## 2025-04-09 LAB
ALBUMIN SERPL-MCNC: 4.9 G/DL (ref 3.2–4.8)
ALBUMIN/GLOB SERPL: 1.8 {RATIO} (ref 1–2)
ALP LIVER SERPL-CCNC: 79 U/L (ref 41–108)
ALT SERPL-CCNC: 18 U/L (ref 10–49)
ANION GAP SERPL CALC-SCNC: 9 MMOL/L (ref 0–18)
AST SERPL-CCNC: 24 U/L (ref ?–34)
ATRIAL RATE: 92 BPM
BASOPHILS # BLD AUTO: 0.07 X10(3) UL (ref 0–0.2)
BASOPHILS NFR BLD AUTO: 1.1 %
BILIRUB SERPL-MCNC: 0.5 MG/DL (ref 0.3–1.2)
BUN BLD-MCNC: 12 MG/DL (ref 9–23)
CALCIUM BLD-MCNC: 10.2 MG/DL (ref 8.7–10.6)
CHLORIDE SERPL-SCNC: 103 MMOL/L (ref 98–112)
CHOLEST SERPL-MCNC: 207 MG/DL (ref ?–200)
CO2 SERPL-SCNC: 28 MMOL/L (ref 21–32)
CREAT BLD-MCNC: 1.36 MG/DL (ref 0.55–1.02)
EGFRCR SERPLBLD CKD-EPI 2021: 47 ML/MIN/1.73M2 (ref 60–?)
EOSINOPHIL # BLD AUTO: 0.17 X10(3) UL (ref 0–0.7)
EOSINOPHIL NFR BLD AUTO: 2.6 %
ERYTHROCYTE [DISTWIDTH] IN BLOOD BY AUTOMATED COUNT: 13.4 %
EST. AVERAGE GLUCOSE BLD GHB EST-MCNC: 114 MG/DL (ref 68–126)
FASTING PATIENT LIPID ANSWER: YES
FASTING STATUS PATIENT QL REPORTED: YES
GLOBULIN PLAS-MCNC: 2.8 G/DL (ref 2–3.5)
GLUCOSE BLD-MCNC: 106 MG/DL (ref 70–99)
HBA1C MFR BLD: 5.6 % (ref ?–5.7)
HCT VFR BLD AUTO: 39.4 % (ref 35–48)
HDLC SERPL-MCNC: 54 MG/DL (ref 40–59)
HGB BLD-MCNC: 13.4 G/DL (ref 12–16)
IMM GRANULOCYTES # BLD AUTO: 0.01 X10(3) UL (ref 0–1)
IMM GRANULOCYTES NFR BLD: 0.2 %
LDLC SERPL CALC-MCNC: 138 MG/DL (ref ?–100)
LYMPHOCYTES # BLD AUTO: 3.37 X10(3) UL (ref 1–4)
LYMPHOCYTES NFR BLD AUTO: 52.2 %
MCH RBC QN AUTO: 30.4 PG (ref 26–34)
MCHC RBC AUTO-ENTMCNC: 34 G/DL (ref 31–37)
MCV RBC AUTO: 89.3 FL (ref 80–100)
MONOCYTES # BLD AUTO: 0.27 X10(3) UL (ref 0.1–1)
MONOCYTES NFR BLD AUTO: 4.2 %
NEUTROPHILS # BLD AUTO: 2.57 X10 (3) UL (ref 1.5–7.7)
NEUTROPHILS # BLD AUTO: 2.57 X10(3) UL (ref 1.5–7.7)
NEUTROPHILS NFR BLD AUTO: 39.7 %
NONHDLC SERPL-MCNC: 153 MG/DL (ref ?–130)
OSMOLALITY SERPL CALC.SUM OF ELEC: 290 MOSM/KG (ref 275–295)
P AXIS: 59 DEGREES
P-R INTERVAL: 166 MS
PLATELET # BLD AUTO: 276 10(3)UL (ref 150–450)
POTASSIUM SERPL-SCNC: 4.4 MMOL/L (ref 3.5–5.1)
PROT SERPL-MCNC: 7.7 G/DL (ref 5.7–8.2)
Q-T INTERVAL: 366 MS
QRS DURATION: 72 MS
QTC CALCULATION (BEZET): 452 MS
R AXIS: 13 DEGREES
RBC # BLD AUTO: 4.41 X10(6)UL (ref 3.8–5.3)
SODIUM SERPL-SCNC: 140 MMOL/L (ref 136–145)
T AXIS: 61 DEGREES
TRIGL SERPL-MCNC: 84 MG/DL (ref 30–149)
TSI SER-ACNC: 0.91 UIU/ML (ref 0.55–4.78)
VENTRICULAR RATE: 92 BPM
VIT D+METAB SERPL-MCNC: 31.5 NG/ML (ref 30–100)
VLDLC SERPL CALC-MCNC: 15 MG/DL (ref 0–30)
WBC # BLD AUTO: 6.5 X10(3) UL (ref 4–11)

## 2025-04-09 PROCEDURE — 83036 HEMOGLOBIN GLYCOSYLATED A1C: CPT

## 2025-04-09 PROCEDURE — 80053 COMPREHEN METABOLIC PANEL: CPT

## 2025-04-09 PROCEDURE — 80061 LIPID PANEL: CPT

## 2025-04-09 PROCEDURE — 71046 X-RAY EXAM CHEST 2 VIEWS: CPT | Performed by: INTERNAL MEDICINE

## 2025-04-09 PROCEDURE — 36415 COLL VENOUS BLD VENIPUNCTURE: CPT

## 2025-04-09 PROCEDURE — 82306 VITAMIN D 25 HYDROXY: CPT

## 2025-04-09 PROCEDURE — 93010 ELECTROCARDIOGRAM REPORT: CPT | Performed by: INTERNAL MEDICINE

## 2025-04-09 PROCEDURE — 93005 ELECTROCARDIOGRAM TRACING: CPT

## 2025-04-09 PROCEDURE — 84443 ASSAY THYROID STIM HORMONE: CPT

## 2025-04-09 PROCEDURE — 85025 COMPLETE CBC W/AUTO DIFF WBC: CPT

## 2025-04-24 ENCOUNTER — HOSPITAL ENCOUNTER (OUTPATIENT)
Dept: ULTRASOUND IMAGING | Age: 50
Discharge: HOME | End: 2025-04-24
Attending: INTERNAL MEDICINE
Payer: COMMERCIAL

## 2025-04-24 DIAGNOSIS — R79.89 ELEVATED SERUM CREATININE: ICD-10-CM

## 2025-04-24 PROCEDURE — 76775 US EXAM ABDO BACK WALL LIM: CPT | Performed by: INTERNAL MEDICINE

## 2025-04-24 RX ORDER — TIRZEPATIDE 15 MG/.5ML
15 INJECTION, SOLUTION SUBCUTANEOUS WEEKLY
Qty: 2 ML | Refills: 3 | Status: CANCELLED | OUTPATIENT
Start: 2025-04-24

## 2025-04-25 ENCOUNTER — TELEPHONE (OUTPATIENT)
Dept: INTERNAL MEDICINE CLINIC | Facility: CLINIC | Age: 50
End: 2025-04-25

## 2025-04-25 NOTE — TELEPHONE ENCOUNTER
Approved    Prior authorization approved  Payer: [facility] [place] Case ID: [id number]    [phone]    [phone]  Note from payer: Your PA request has been approved.  Additional information will be provided in the approval communication. (Message 1142)  Approval Details    Authorized from April 25, 2025 to April 25, 2026

## 2025-04-25 NOTE — TELEPHONE ENCOUNTER
Patient states PA is needed for zepbound 15 mg per pharmacy  Completed in epic today  Attached first visit note from Feb 2023 and most recent  Pending approval or denial

## 2025-05-06 ENCOUNTER — OFFICE VISIT (OUTPATIENT)
Dept: INTERNAL MEDICINE CLINIC | Facility: CLINIC | Age: 50
End: 2025-05-06
Payer: COMMERCIAL

## 2025-05-06 VITALS
DIASTOLIC BLOOD PRESSURE: 78 MMHG | SYSTOLIC BLOOD PRESSURE: 132 MMHG | HEART RATE: 107 BPM | BODY MASS INDEX: 33.63 KG/M2 | HEIGHT: 64 IN | WEIGHT: 197 LBS | OXYGEN SATURATION: 97 % | RESPIRATION RATE: 18 BRPM

## 2025-05-06 DIAGNOSIS — Z51.81 THERAPEUTIC DRUG MONITORING: Primary | ICD-10-CM

## 2025-05-06 DIAGNOSIS — I10 ESSENTIAL HYPERTENSION, BENIGN: ICD-10-CM

## 2025-05-06 DIAGNOSIS — E66.9 OBESITY (BMI 30-39.9): ICD-10-CM

## 2025-05-06 DIAGNOSIS — R73.03 PREDIABETES: ICD-10-CM

## 2025-05-06 PROCEDURE — 3078F DIAST BP <80 MM HG: CPT | Performed by: NURSE PRACTITIONER

## 2025-05-06 PROCEDURE — 3075F SYST BP GE 130 - 139MM HG: CPT | Performed by: NURSE PRACTITIONER

## 2025-05-06 PROCEDURE — 3008F BODY MASS INDEX DOCD: CPT | Performed by: NURSE PRACTITIONER

## 2025-05-06 PROCEDURE — 99214 OFFICE O/P EST MOD 30 MIN: CPT | Performed by: NURSE PRACTITIONER

## 2025-05-06 NOTE — PROGRESS NOTES
HISTORY OF PRESENT ILLNESS  Chief Complaint   Patient presents with    Weight Check     Down 4 lb(2/12/25)     María Elena Hurtado is a 49 year old female here for follow up with medical weight loss program for the treatment of overweight, obesity, or morbid obesity.     Down 4 lbs (f/u from 2/2025)  Compliant on zepbound 15mg weekly   Tolerating well, helping with decreasing appetite and no side effects     Has still been in a lot of back pain, legs and knees   Bulging disc (on nerve) has done injections, but does not want to get surgery  Exercise/Activity: treadmill 5 days per week (30 min), strength training 3 days per week -limited due to back pain  Nutrition: eating regular meals, +protein, minimal veggies. not tracking reports  Meals out per week on average: 0  Stress is manageable   Sleep: 4 hours/night, waking up feeling tired (pain and hot flashes)     Denies chest pain, shortness of breath, dizziness, blurred vision, headache, paresthesia, nausea/vomiting.     Breakfast Lunch Dinner Snacks Fluids   Reviewed              Wt Readings from Last 6 Encounters:   05/06/25 197 lb (89.4 kg)   04/02/25 198 lb (89.8 kg)   02/12/25 201 lb (91.2 kg)   01/08/25 195 lb (88.5 kg)   12/19/24 199 lb (90.3 kg)   07/31/24 203 lb (92.1 kg)          REVIEW OF SYSTEMS  GENERAL: feels well otherwise, denied any fevers chills or night sweats   LUNGS: denies shortness of breath  CARDIOVASCULAR: denies chest pain  GI: denies abdominal pain  MUSCULOSKELETAL: +back pain, +joint pains   PSYCH: denies change in behavior or mood, denies feeling sad or depressed    EXAM  /78   Pulse 107   Resp 18   Ht 5' 4\" (1.626 m)   Wt 197 lb (89.4 kg)   LMP 09/20/2010   SpO2 97%   BMI 33.81 kg/m²       GENERAL: well developed, well nourished, in no apparent distress, A/O x3  SKIN: no rashes, no suspicious lesions  HEENT: atraumatic, normocephalic, OP-clear, PERRLA  NECK: supple, no adenopathy  LUNGS: CTA in all fields, breathing non  labored  CARDIO: RRR without murmur  GI: +BS, NT/ND, no masses or HSM  EXTREMITIES: no cyanosis, no clubbing, no edema    Lab Results   Component Value Date     (H) 04/09/2025    BUN 12 04/09/2025    BUNCREA 8.8 (L) 12/08/2019    CREATSERUM 1.36 (H) 04/09/2025    ANIONGAP 9 04/09/2025    GFR 78 09/20/2016    GFRNAA 63 05/28/2022    GFRAA 73 05/28/2022    CA 10.2 04/09/2025    OSMOCALC 290 04/09/2025    ALKPHO 79 04/09/2025    AST 24 04/09/2025    ALT 18 04/09/2025    BILT 0.5 04/09/2025    TP 7.7 04/09/2025    ALB 4.9 (H) 04/09/2025    GLOBULIN 2.8 04/09/2025     04/09/2025    K 4.4 04/09/2025     04/09/2025    CO2 28.0 04/09/2025     Lab Results   Component Value Date     04/09/2025    A1C 5.6 04/09/2025     Lab Results   Component Value Date    CHOLEST 207 (H) 04/09/2025    TRIG 84 04/09/2025    HDL 54 04/09/2025     (H) 04/09/2025    VLDL 15 04/09/2025    NONHDLC 153 (H) 04/09/2025     No results found for: \"B12\", \"VITB12\"  Lab Results   Component Value Date    VITD 31.5 04/09/2025       Medications Ordered Prior to Encounter[1]    ASSESSMENT/PLAN    ICD-10-CM    1. Therapeutic drug monitoring  Z51.81       2. Obesity (BMI 30-39.9)  E66.9       3. Essential hypertension, benign  I10       4. Prediabetes  R73.03           PLAN   Initial Weight Data and Goal Weight Loss:  Initial consult: #215 lbs on 1/2023  Weight Calculations  Initial Weight: 215 lbs  Initial Weight Date: 01/02/23  Today's Weight: 197 lbs  5% Goal: 10.75  10% Goal: 21.5  Total Weight Loss: 18 lbs  Total weight loss: Down 4 lbs total, Net loss 18 lbs  Continue with medications: zepbound 15mg weekly   --advised of side effects and adverse effects of this medication  Contradictions: has done phentermine in the past, ozempic, rybelsus, wegovy   Reviewed labs  HTN  stable, follows with PCP  Hx of prediabetes, reviewed last a1c 5.6% on 4/2025- was previously 5.9% on 8/2023  Joint pain, encouraged aqua therapy (with last  appt)  Interm. Snoring, can think about possibly doing a sleep study in the future   Wrote out macros and encouraged tracking food   Nutrition: Low carb diet, recommended to eat breakfast daily/ regular protein intake  Follow up with dietitian and psychologist as recommended.  Discussed the role of sleep and stress in weight management.  Counseled on comprehensive weight loss plan including attention to nutrition, exercise and behavior/stress management for success. See patient instruction below for more details.  Discussed strategies to overcome barriers to successful weight loss and weight maintenance  FITTE: ACSM recommendations (150-300 minutes/ week in active weight loss)   Weight Loss Consent to treat reviewed and signed.    Total time spent on chart review, pre-charting, obtaining history, counseling, and educating, reviewing labs was 30 minutes.       NOTE TO PATIENT: The 21st Century Cures Act makes clinical notes like these available to patients in the interest of transparency. Clinical notes are medical documents used by physicians and care providers to communicate with each other. These documents include medical language and terminology, abbreviations, and treatment information that may sound technical and at times possibly unfamiliar. In addition, at times, the verbiage may appear blunt or direct. These documents are one tool providers use to communicate relevant information and clinical opinions of the care providers in a way that allows common understanding of the clinical context.     There are no Patient Instructions on file for this visit.    No follow-ups on file.    Patient verbalizes understanding.    MJ Peterson             [1]   Current Outpatient Medications on File Prior to Visit   Medication Sig Dispense Refill    montelukast 10 MG Oral Tab Take 1 tablet (10 mg total) by mouth nightly. 90 tablet 3    metoprolol succinate ER 25 MG Oral Tablet 24 Hr Take 0.5 tablets (12.5 mg  total) by mouth daily.      spironolactone 100 MG Oral Tab Take 1 tablet (100 mg total) by mouth daily.      Tirzepatide-Weight Management (ZEPBOUND) 15 MG/0.5ML Subcutaneous Solution Auto-injector Inject 15 mg into the skin once a week. 2 mL 3    cetirizine 10 MG Oral Tab Take 1 tablet (10 mg total) by mouth daily. 90 tablet 0    CYCLOBENZAPRINE 10 MG Oral Tab TAKE 1 TABLET BY MOUTH EVERY DAY AT NIGHT AS NEEDED FOR MUSCLE SPASM 30 tablet 0    DULoxetine 30 MG Oral Cap DR Particles Take 2 capsules (60 mg total) by mouth daily. 180 capsule 0    amLODIPine 5 MG Oral Tab Take 2 tablets (10 mg total) by mouth daily. 180 tablet 0    FLUTICASONE PROPIONATE 50 MCG/ACT Nasal Suspension SPRAY 2 SPRAYS BY NASAL ROUTE DAILY. 48 mL 1    albuterol 108 (90 Base) MCG/ACT Inhalation Aero Soln Inhale 2 puffs into the lungs every 4 (four) hours as needed for Wheezing or Shortness of Breath. 3 each 2    losartan 100 MG Oral Tab Take 1 tablet (100 mg total) by mouth daily. 90 tablet 3    Olopatadine HCl (CVS OLOPATADINE HCL) 0.2 % Ophthalmic Solution Apply 1 drop to eye daily. INSTILL 1 DROP INTO AFFECTED EYE EVERY DAY 2.5 mL 5    triamcinolone 0.1 % External Cream Apply topically 2 (two) times daily as needed. 60 g 0    Doxycycline Hyclate 100 MG Oral Tab Take 1 tablet (100 mg total) by mouth 2 (two) times daily. 20 tablet 0     No current facility-administered medications on file prior to visit.

## 2025-05-06 NOTE — PATIENT INSTRUCTIONS
Next steps:  1.  Fill your prescribed medication and take as discussed and prescribed: zepbound 15mg weekly   2.  Schedule a personal nutrition consultation with one of our registered dieticians     Please try to work on the following dietary changes:  Daily protein recommendation to start:  grams  Daily carbohydrate: <113g  Daily calories: 1,300  1.  Drink water with meals and throughout the day, cut down on soda and/or juice if consumed. Consider flavored water options like Bubbly, Spindrift, Hint and Naren.  2.  Eat breakfast daily and focus on having protein with each meal, examples include: greek yogurt, cottage cheese, hard boiled egg, whole grain toast with peanut butter.   3.  Reduce refined carbohydrates and sugars which includes items such as sweets, as well as rice, pasta, and bread and make sure to choose whole grain options when having them with just 1 serving per meal about the size of your inner palm.  4.  Consume non starchy veggies daily working towards making them a good 50% of your daily food intake. Add them to lunch and dinner consistently.  5.  Start a daily probiotic: VSL#3 is recommended, (order on line at www.vsl3.com). Take 1 capsule daily with water for 30 days, then reduce to 1 every other day (this will reduce the cost). Capsules can be left out for 2 weeks, but then must be refrigerated.      Please download stella My Fitness Pal, LoseIt! Or Net Diary to monitor daily dietary intake and you will be able to see if you are eating the right amount of calories or too much or too little which would hinder weight loss. Additionally this will help to see your daily carbohydrate and protein intake. When you set the stella up choose 1-2 lbs/week as a goal.  Keeping a paper food journal is an option as well to remain accountable for your choices- this is the start to mindful eating! A low calorie diet has been consistently shown to support weight loss.     Continue or start exercising to help  establish a routine. If not already exercising begin with 1 day and progress as able with long-term goal of 30 minutes 5 days a week at a minimum.     Meditation daily can help manage and control stress. Chronic stress can make weight loss difficult.  Exercising is one way to help with stress, but meditation using the CALM Leif or another comparable alternative can be done in your home or place of work with little time commitment. This Leif can also help work on behavior change and improve sleep. Check out the segment under Calm Masterclass and listen to The 4 Pillars of Health. A great way to begin learning about the foundation of lifestyle with practical tips to use in your every day.     Check out www.yourweightmatters.org blog for continued daily support and education along this weight loss journey!    Patient Resources:     Personal Training/Fitness Classes/Health Coaching     [facility] @ [email or web address] Full fitness center with group fitness and personal training. Discount available as client of [facility].  Health Coaching and Personal Training with Nikki Diallo at our [facility]- individual weekly coaching with option to add personal training and small group fitness classes targeted at weight loss- [phone] and/or email @ [email or web address]  360FIT [place] [email or web address]. Group Fitness [phone] and/or email [place] at [email or web address]  [facility] Fitness Centers with multiple locations: The Loadown (www.Tricycle), Eat The Osmosis Skincare Fitness (www.Clickable.Elysia), Fit Body Bootcamp (www.ZealifybodybootOperative Mediap.Elysia), CorkCRM (www.Everyday.me.Elysia), The Exercise  (www.exercisecoach.Elysia)     Online Fitness  Fitness  on Utube  Fit in 10 DVD series- www.ehlzw02LHD.com  Sit and Be Fit - Chair exercise series Www.sitandbefit.org  Hip Hop  Fit with Gene Thrasher at www.hiphopfit.net     Apps for on the Go Fitness  [place] 7 Minute Workout (orange box with white 7) - free on the go HIIT training leif  Peloton Leif @ www.onepeloton.com     Nutrition Trackers and Tools  LoseIT! And My Fitness Pal apps and on line for tracking nutrition  NOOM - virtual health coaching  FitFoundation (healthy meals on the go) in [place] @ www.[email or web address].Intean Poalroath Rongroeurng  Bistro MD @ Singspielbistromd.com and Bheafx17 (keto and low carb plans recommended) @ www.byciln79.com, Metabolic Meals @ www.MyMetabolicMeals.com - individual prepared meals to go  Gobble, Blue Apron, Home , Every Plate, Sunbasket- on line meal delivery programs for preparation at home  Meal Village in [place] for homemade meals to go @ www.mealvillage.Intean Poalroath Rongroeurng  Diet Doctor @ www.dietdoctor.Intean [place]g - low carb swaps  Yumemory lane syndications - meal prep and planning leif (www.yummly.com)     Stress Management/Behavior/Mindful Eating  CALM meditation leif (www.calm.com)  Headspace  Am I Hungry? Mindful eating virtual  leif  Www.yourweightmatters.org - Obesity Action Coalition sponsored Blog posts daily  Motivation leif (black box with white \")- daily supportive messages sent to your phone     Books/Video Education/Podcasts  Mindless Eating by Galo Wilkinson  Why We Get Sick by Librado Lemons (a book about insulin resistance)  Atomic Habits by Khari Chang (a book about taking small steps to promote greater behavior change)   Can't Hurt Me by Davi Perry (a book exploring the power of discipline in achieving your goals)  The End of Dieting: How to Live for Life by Dr. Eliel Hussein M.D. or listen to The MarketBrief Podcast Episode 63: Understanding \"Nutritarian\" Eating w/Dr. Eliel Hussein  Your Body in Balance: The New Science of Food, Hormones, and Health by Dr. Mckay Plascencia  The Menopause Diet Plan by Kirsty Nichols and Elyse Cobos  The Complete Guide to fasting by Dr. Omalley  Sugar, Salt & Fat by Mary Harrison, Ph.D, R.D.  Weight Loss  Surgery Will Not Treat Food Addiction by Cheryl Larsen Ph.D  The Game Changers- PageScienceix Documentary on plant based nutrition  Fed Up - documentary about obesity (Free on Utube)  The Truth About Sugar - documentary on sugar (Free on Utube, https://youINDIGO Biosciencesu.be/5F3tmneXX3u)  The Dr. Baird T5 Wellness Plan by Dr. Lex Baird MD  Fitlosophy Fitspiration - journal to better health (found at [employer] in fitness aisle)  What Happened to You?- a look at the impact trauma has on behavior written by Bharti Washington and Dr. Norris Patel  Whole Again by Gab Stewart - discovering your true self after trauma  Ishmael Lanza talk on Federated Sample, The Call to Courage  Podcasts: The Exam Room by the Physician's Committee, Nutrition Facts by Dr. Claros    We are here to support you with weight loss, but please remember that you still need your primary care provider for your routine health maintenance.

## 2025-05-09 DIAGNOSIS — I10 ESSENTIAL HYPERTENSION, BENIGN: ICD-10-CM

## 2025-05-09 RX ORDER — AMLODIPINE BESYLATE 5 MG/1
10 TABLET ORAL DAILY
Qty: 180 TABLET | Refills: 3 | Status: SHIPPED | OUTPATIENT
Start: 2025-05-09

## 2025-05-09 NOTE — TELEPHONE ENCOUNTER
Protocol FAIL    Requesting: amLODIPine 5 MG Oral Tab     LOV: 4/2/25  RTC: 4 WEEKS  Filled: 2/6/25 180 TABLET 0 REFILL  Recent Labs: 4/9/25    Upcoming OV   Future Appointments   Date Time Provider Department Center   5/10/2025 10:00 AM HENNA GEIGER [facility]1 [facility] [place]   5/14/2025  1:20 PM Dai Lees MD [facility] 8 [facility]   9/17/2025  2:20 PM Margy Bhatia APRN [facility]   2/18/2026  9:20 AM Margy Bhatia APRN [facility]

## 2025-05-10 ENCOUNTER — LAB ENCOUNTER (OUTPATIENT)
Dept: LAB | Age: 50
End: 2025-05-10
Attending: INTERNAL MEDICINE
Payer: COMMERCIAL

## 2025-05-10 ENCOUNTER — HOSPITAL ENCOUNTER (OUTPATIENT)
Dept: MAMMOGRAPHY | Age: 50
Discharge: HOME | End: 2025-05-10
Attending: INTERNAL MEDICINE
Payer: COMMERCIAL

## 2025-05-10 DIAGNOSIS — R79.89 ELEVATED SERUM CREATININE: ICD-10-CM

## 2025-05-10 DIAGNOSIS — Z12.31 VISIT FOR SCREENING MAMMOGRAM: ICD-10-CM

## 2025-05-10 LAB
ANION GAP SERPL CALC-SCNC: 9 MMOL/L (ref 0–18)
BILIRUB UR QL STRIP.AUTO: NEGATIVE
BUN BLD-MCNC: 17 MG/DL (ref 9–23)
CALCIUM BLD-MCNC: 9.7 MG/DL (ref 8.7–10.6)
CHLORIDE SERPL-SCNC: 104 MMOL/L (ref 98–112)
CLARITY UR REFRACT.AUTO: CLEAR
CO2 SERPL-SCNC: 26 MMOL/L (ref 21–32)
CREAT BLD-MCNC: 1.3 MG/DL (ref 0.55–1.02)
EGFRCR SERPLBLD CKD-EPI 2021: 50 ML/MIN/1.73M2 (ref 60–?)
FASTING STATUS PATIENT QL REPORTED: YES
GLUCOSE BLD-MCNC: 79 MG/DL (ref 70–99)
GLUCOSE UR STRIP.AUTO-MCNC: NORMAL MG/DL
KETONES UR STRIP.AUTO-MCNC: NEGATIVE MG/DL
LEUKOCYTE ESTERASE UR QL STRIP.AUTO: NEGATIVE
NITRITE UR QL STRIP.AUTO: NEGATIVE
OSMOLALITY SERPL CALC.SUM OF ELEC: 288 MOSM/KG (ref 275–295)
PH UR STRIP.AUTO: 6 [PH] (ref 5–8)
POTASSIUM SERPL-SCNC: 4.9 MMOL/L (ref 3.5–5.1)
PROT UR STRIP.AUTO-MCNC: NEGATIVE MG/DL
RBC UR QL AUTO: NEGATIVE
SODIUM SERPL-SCNC: 139 MMOL/L (ref 136–145)
SP GR UR STRIP.AUTO: 1.01 (ref 1–1.03)
UROBILINOGEN UR STRIP.AUTO-MCNC: NORMAL MG/DL

## 2025-05-10 PROCEDURE — 77067 SCR MAMMO BI INCL CAD: CPT | Performed by: INTERNAL MEDICINE

## 2025-05-10 PROCEDURE — 80048 BASIC METABOLIC PNL TOTAL CA: CPT

## 2025-05-10 PROCEDURE — 36415 COLL VENOUS BLD VENIPUNCTURE: CPT

## 2025-05-10 PROCEDURE — 81003 URINALYSIS AUTO W/O SCOPE: CPT

## 2025-05-10 PROCEDURE — 77063 BREAST TOMOSYNTHESIS BI: CPT | Performed by: INTERNAL MEDICINE

## 2025-05-14 ENCOUNTER — OFFICE VISIT (OUTPATIENT)
Dept: INTERNAL MEDICINE CLINIC | Facility: CLINIC | Age: 50
End: 2025-05-14
Payer: COMMERCIAL

## 2025-05-14 VITALS
HEART RATE: 68 BPM | OXYGEN SATURATION: 98 % | DIASTOLIC BLOOD PRESSURE: 78 MMHG | SYSTOLIC BLOOD PRESSURE: 126 MMHG | BODY MASS INDEX: 34 KG/M2 | WEIGHT: 197.63 LBS | TEMPERATURE: 98 F

## 2025-05-14 DIAGNOSIS — K64.9 HEMORRHOIDS, UNSPECIFIED HEMORRHOID TYPE: ICD-10-CM

## 2025-05-14 DIAGNOSIS — R79.89 ELEVATED SERUM CREATININE: ICD-10-CM

## 2025-05-14 DIAGNOSIS — I10 ESSENTIAL HYPERTENSION, BENIGN: Primary | ICD-10-CM

## 2025-05-14 DIAGNOSIS — K59.00 CONSTIPATION, UNSPECIFIED CONSTIPATION TYPE: ICD-10-CM

## 2025-05-14 DIAGNOSIS — E66.09 OBESITY DUE TO EXCESS CALORIES, UNSPECIFIED CLASS, UNSPECIFIED WHETHER SERIOUS COMORBIDITY PRESENT: ICD-10-CM

## 2025-05-14 PROCEDURE — G2211 COMPLEX E/M VISIT ADD ON: HCPCS | Performed by: INTERNAL MEDICINE

## 2025-05-14 PROCEDURE — 99214 OFFICE O/P EST MOD 30 MIN: CPT | Performed by: INTERNAL MEDICINE

## 2025-05-14 PROCEDURE — 3078F DIAST BP <80 MM HG: CPT | Performed by: INTERNAL MEDICINE

## 2025-05-14 PROCEDURE — 3074F SYST BP LT 130 MM HG: CPT | Performed by: INTERNAL MEDICINE

## 2025-05-14 RX ORDER — LACTULOSE 10 G/15ML
20 SOLUTION ORAL DAILY PRN
Qty: 237 ML | Refills: 0 | Status: SHIPPED | OUTPATIENT
Start: 2025-05-14

## 2025-05-14 NOTE — PROGRESS NOTES
Subjective:   María Elena Hurtado is a 49 year old female  who presents for Follow - Up     The following individual(s) verbally consented to be recorded using ambient AI listening technology and understand that they can each withdraw their consent to this listening technology at any point by asking the clinician to turn off or pause the recording:    Patient name: María Elena Hurtado      History of Present Illness  María Elena Hurtado is a 49 year old female with hypertension who presents for follow-up on blood pressure management and kidney function.    She manages hypertension with metoprolol succinate ER 12.5 mg, losartan 100 mg, and amlodipine 10 mg daily, achieving well-controlled blood pressure. Recent labs show a slight elevation in creatinine levels, with normal urinalysis and renal ultrasound. No recent physical activity changes or muscle breakdown are noted.    Chronic constipation persists despite laxatives and stool softeners. Hemorrhoids cause significant discomfort during bowel movements, and Preparation H is ineffective. Had a colonoscopy in 2022    She consumes four bottles of water daily and some juice but lacks the urge to urinate during the day, urinating once or twice after work. She denies ibuprofen use but takes spironolactone.    She is on Zepbound 15 mg without nausea or vomiting. Discussed how this could contribute to constipation but declined decreasing     History/Other:    Chief Complaint Reviewed and Verified  No Further Nursing Notes to   Review  Allergies Reviewed  Medications Reviewed  OB Status Reviewed         Current Medications[1]    Review of Systems:  Pertinent items are noted in HPI.  A comprehensive 10 point review of systems was completed.  Pertinent positives and negatives noted in the the HPI.        Objective:   /78 (BP Location: Right arm, Patient Position: Sitting, Cuff Size: adult)   Pulse 68   Temp 97.6 °F (36.4 °C) (Temporal)   Wt 197 lb 9.6 oz (89.6 kg)   LMP  09/20/2010   SpO2 98%   BMI 33.92 kg/m²  Estimated body mass index is 33.92 kg/m² as calculated from the following:    Height as of 5/6/25: 5' 4\" (1.626 m).    Weight as of this encounter: 197 lb 9.6 oz (89.6 kg).  PHYSICAL EXAM:   General: no acute distress   Respiratory: no increased work of breathing; good air exchange; CTAB; no crackles or wheezing   Cardiovascular: RRR; S1, S2; no murmurs; no edema   Gastrointestinal: soft; non-distended; non-tender  Neurological: awake, alert, oriented x3; CNII-XII grossly intact;  Behavioral/Psych: euthymic; appropriate affect     Physical Exam      Assessment & Plan:     Assessment & Plan  Elevated creatinine  Slightly elevated creatinine with no clear etiology. Normal urinalysis and renal ultrasound. Infrequent urination despite adequate fluid intake requires further investigation.  - Refer to nephrologist for evaluation.  - Ensure adequate hydration and monitor urine output.    Constipation with hemorrhoids  Chronic constipation with hemorrhoids causing discomfort and leading to pt not being able to fully evacuate. Ineffective treatment with Miralax. Possible exacerbation by Zepbound (tirzepatide).  - Prescribe lactulose daily as needed.  - Refer to gastroenterologist  - Refer to colorectal surgeon for potential hemorrhoidectomy.    Hypertension  Blood pressure well-controlled with current medication regimen.  - Continue current antihypertensive regimen.    Obesity - as above. On zepbound following with weight loss clinic. Declined decreasing dose to see if that would help with constipation.       This note was created utilizing [employer] speech recognition software which may lead to grammatical errors/typos.   If any word or phrase is confusing, it is likely due to recognition error. Please ask the provider for clarification.      Dai Slaughter MD         [1]   Current Outpatient Medications   Medication Sig Dispense Refill    amLODIPine 5 MG Oral Tab Take 2 tablets (10  mg total) by mouth daily. 180 tablet 3    montelukast 10 MG Oral Tab Take 1 tablet (10 mg total) by mouth nightly. 90 tablet 3    metoprolol succinate ER 25 MG Oral Tablet 24 Hr Take 0.5 tablets (12.5 mg total) by mouth daily.      spironolactone 100 MG Oral Tab Take 1 tablet (100 mg total) by mouth daily.      Tirzepatide-Weight Management (ZEPBOUND) 15 MG/0.5ML Subcutaneous Solution Auto-injector Inject 15 mg into the skin once a week. 2 mL 3    cetirizine 10 MG Oral Tab Take 1 tablet (10 mg total) by mouth daily. 90 tablet 0    CYCLOBENZAPRINE 10 MG Oral Tab TAKE 1 TABLET BY MOUTH EVERY DAY AT NIGHT AS NEEDED FOR MUSCLE SPASM 30 tablet 0    DULoxetine 30 MG Oral Cap DR Particles Take 2 capsules (60 mg total) by mouth daily. 180 capsule 0    FLUTICASONE PROPIONATE 50 MCG/ACT Nasal Suspension SPRAY 2 SPRAYS BY NASAL ROUTE DAILY. 48 mL 1    Doxycycline Hyclate 100 MG Oral Tab Take 1 tablet (100 mg total) by mouth 2 (two) times daily. 20 tablet 0    albuterol 108 (90 Base) MCG/ACT Inhalation Aero Soln Inhale 2 puffs into the lungs every 4 (four) hours as needed for Wheezing or Shortness of Breath. 3 each 2    losartan 100 MG Oral Tab Take 1 tablet (100 mg total) by mouth daily. 90 tablet 3    Olopatadine HCl (CVS OLOPATADINE HCL) 0.2 % Ophthalmic Solution Apply 1 drop to eye daily. INSTILL 1 DROP INTO AFFECTED EYE EVERY DAY 2.5 mL 5    triamcinolone 0.1 % External Cream Apply topically 2 (two) times daily as needed. 60 g 0

## 2025-05-18 DIAGNOSIS — G89.29 CHRONIC BILATERAL LOW BACK PAIN WITH BILATERAL SCIATICA: ICD-10-CM

## 2025-05-18 DIAGNOSIS — M54.42 CHRONIC BILATERAL LOW BACK PAIN WITH BILATERAL SCIATICA: ICD-10-CM

## 2025-05-18 DIAGNOSIS — M54.41 CHRONIC BILATERAL LOW BACK PAIN WITH BILATERAL SCIATICA: ICD-10-CM

## 2025-05-20 RX ORDER — DULOXETIN HYDROCHLORIDE 30 MG/1
60 CAPSULE, DELAYED RELEASE ORAL DAILY
Qty: 180 CAPSULE | Refills: 0 | Status: SHIPPED | OUTPATIENT
Start: 2025-05-20

## 2025-05-20 NOTE — TELEPHONE ENCOUNTER
Refill Request    Last GFR 5/10/25- 50 protocol failed    Medication request: DULoxetine 30 MG Oral Cap DR Particles Take 2 capsules (60 mg total) by mouth daily.     LOV:1/8/2025 Kyle De Anda, DO   Due back to clinic per last office note:  \"She will let me know how she is doing in 4 weeks.\"  NOV: Visit date not found      [place]: 2/6/25 #180 - 90 day supply    Urine drug screen (if applicable): n/a  Pain contract: n/a    LOV plan (if weaning or changing medications): Per  at last office visit: \"We discussed treatment options, which includes physical therapy for neutral to flexion lumbar stabilization, increasing the duloxetine to 60 mg daily, or repeating a left or bilateral L5 transforaminal epidural steroid injection under fluoroscopy. She elects to increase the duloxetine and continue home exercise program. \"

## 2025-07-15 NOTE — TELEPHONE ENCOUNTER
Requesting   Requested Prescriptions     Pending Prescriptions Disp Refills    Tirzepatide-Weight Management (ZEPBOUND) 15 MG/0.5ML Subcutaneous Solution Auto-injector 2 mL 3     Sig: Inject 15 mg into the skin once a week.       LOV: 5/06/2025  RTC: 9/17/2025  Last Relevant Labs: na  Filled: 3/27/2025 #2 ml with 3 refills    Future Appointments   Date Time Provider Department Center   8/20/2025 10:30 AM [facility] De [facility]a [id number]   9/17/2025  2:20 PM Margy Bhatia APRN [facility]   2/18/2026  9:20 AM Margy Bhatia APRN [facility]       
chest pain/ pneumonia/ uti

## 2025-07-16 RX ORDER — TIRZEPATIDE 15 MG/.5ML
15 INJECTION, SOLUTION SUBCUTANEOUS WEEKLY
Qty: 2 ML | Refills: 3 | Status: SHIPPED | OUTPATIENT
Start: 2025-07-16

## 2025-07-17 ENCOUNTER — PATIENT MESSAGE (OUTPATIENT)
Dept: INTERNAL MEDICINE CLINIC | Facility: CLINIC | Age: 50
End: 2025-07-17

## 2025-07-23 NOTE — TELEPHONE ENCOUNTER
Spoke to patient about  Zepbound not being covered anymore willing to  try wegovy again would like to know if there is anything else that could help as she has plateau on wegovy in the past.

## 2025-07-23 NOTE — TELEPHONE ENCOUNTER
Patient left voicemail that her insurance said we would need to apply and state that she tried wegovy and reached plateau and switched to zepbound to try and obtain coverage.  If she cannot get zepbound she wonders about ozempic.    I do not see a new pa has been tried for zepbound, however, I have had no approvals sending notes and verification that a patient has tried and failed with wegovy for any of the pa's I have done

## 2025-07-25 NOTE — TELEPHONE ENCOUNTER
Ok, I updated my last office appt. (About wegovy). Do you see the order in for zepbound 15mg (in July 16, 2025- from [facility])?

## 2025-07-28 NOTE — TELEPHONE ENCOUNTER
PA entered in epic for Zepbound 15 mg today  Attached first visit note and most recent note  Pending approval or denial

## 2025-07-30 ENCOUNTER — TELEPHONE (OUTPATIENT)
Dept: PHYSICAL MEDICINE AND REHAB | Facility: CLINIC | Age: 50
End: 2025-07-30

## 2025-07-30 ENCOUNTER — OFFICE VISIT (OUTPATIENT)
Dept: PHYSICAL MEDICINE AND REHAB | Facility: CLINIC | Age: 50
End: 2025-07-30
Payer: COMMERCIAL

## 2025-07-30 VITALS
WEIGHT: 197 LBS | SYSTOLIC BLOOD PRESSURE: 108 MMHG | DIASTOLIC BLOOD PRESSURE: 72 MMHG | HEIGHT: 64 IN | BODY MASS INDEX: 33.63 KG/M2 | HEART RATE: 91 BPM | OXYGEN SATURATION: 96 %

## 2025-07-30 DIAGNOSIS — M48.062 LUMBAR STENOSIS WITH NEUROGENIC CLAUDICATION: Primary | ICD-10-CM

## 2025-07-30 DIAGNOSIS — M54.42 CHRONIC BILATERAL LOW BACK PAIN WITH BILATERAL SCIATICA: ICD-10-CM

## 2025-07-30 DIAGNOSIS — G89.29 CHRONIC BILATERAL LOW BACK PAIN WITH BILATERAL SCIATICA: ICD-10-CM

## 2025-07-30 DIAGNOSIS — M54.41 CHRONIC BILATERAL LOW BACK PAIN WITH BILATERAL SCIATICA: ICD-10-CM

## 2025-07-30 PROCEDURE — 99214 OFFICE O/P EST MOD 30 MIN: CPT | Performed by: PHYSICAL MEDICINE & REHABILITATION

## 2025-07-30 PROCEDURE — 3078F DIAST BP <80 MM HG: CPT | Performed by: PHYSICAL MEDICINE & REHABILITATION

## 2025-07-30 PROCEDURE — 3008F BODY MASS INDEX DOCD: CPT | Performed by: PHYSICAL MEDICINE & REHABILITATION

## 2025-07-30 PROCEDURE — 3074F SYST BP LT 130 MM HG: CPT | Performed by: PHYSICAL MEDICINE & REHABILITATION

## 2025-07-30 RX ORDER — DULOXETIN HYDROCHLORIDE 30 MG/1
60 CAPSULE, DELAYED RELEASE ORAL DAILY
Qty: 180 CAPSULE | Refills: 0 | Status: SHIPPED | OUTPATIENT
Start: 2025-07-30

## 2025-07-30 RX ORDER — CYCLOBENZAPRINE HCL 10 MG
10 TABLET ORAL NIGHTLY PRN
Qty: 30 TABLET | Refills: 0 | Status: SHIPPED | OUTPATIENT
Start: 2025-07-30

## 2025-08-24 ENCOUNTER — PATIENT MESSAGE (OUTPATIENT)
Dept: INTERNAL MEDICINE CLINIC | Facility: CLINIC | Age: 50
End: 2025-08-24

## 2025-08-26 DIAGNOSIS — M54.41 CHRONIC BILATERAL LOW BACK PAIN WITH BILATERAL SCIATICA: ICD-10-CM

## 2025-08-26 DIAGNOSIS — G89.29 CHRONIC BILATERAL LOW BACK PAIN WITH BILATERAL SCIATICA: ICD-10-CM

## 2025-08-26 DIAGNOSIS — M54.42 CHRONIC BILATERAL LOW BACK PAIN WITH BILATERAL SCIATICA: ICD-10-CM

## 2025-08-26 RX ORDER — CYCLOBENZAPRINE HCL 10 MG
10 TABLET ORAL NIGHTLY PRN
Qty: 30 TABLET | Refills: 0 | Status: SHIPPED | OUTPATIENT
Start: 2025-08-26

## (undated) DIAGNOSIS — R79.89 ELEVATED SERUM CREATININE: Primary | ICD-10-CM

## (undated) DIAGNOSIS — R59.9 LYMPH NODE ENLARGEMENT: Primary | ICD-10-CM

## (undated) NOTE — LETTER
Date & Time: 12/19/2024, 11:46 AM  Patient: María Elena Hurtado  Encounter Provider(s):    Leidy Valdez PA       To Whom It May Concern:    María Elena Hurtado was seen and treated in our department on 12/19/2024. Please allow patient to work from home 12/20/24.    If you have any questions or concerns, please do not hesitate to call.        _____________________________  Physician/APC Signature

## (undated) NOTE — LETTER
Date & Time: 6/24/2024, 1:06 PM  Patient: María Elena Hurtado  Encounter Provider(s):    Ann Lobo PA       To Whom It May Concern:    María Elena Hurtado was seen and treated in our department on 6/24/2024.  Patient will return to work on  Wednesday if feeling better and fever free.    If you have any questions or concerns, please do not hesitate to call.        _____________________________  Physician/APC Signature

## (undated) NOTE — LETTER
Date & Time: 1/24/2022, 4:00 PM  Patient: Jose Henry  Encounter Provider(s): Ruth Su MD       To Whom It May Concern:    Stalin Saenz was seen and treated in our department on 1/24/2022. She should not return to work until 1/30/2022.     If

## (undated) NOTE — ED AVS SNAPSHOT
Kenzie Acevedo   MRN: [id number]    Department:  THE [facility] Emergency Department in [place]   Date of Visit:  2/17/2018           Disclosure     Insurance plans vary and the physician(s) referred by the ER may not be covered by your plan.  Please conta tell this physician (or your personal doctor if your instructions are to return to your personal doctor) about any new or lasting problems. The primary care or specialist physician will see patients referred from the [facility] Emergency Department.  Mercy Niño

## (undated) NOTE — LETTER
WHERE IS YOUR PAIN NOW?  Kasie the areas on your body where you feel the described sensations.  Use the appropriate symbol.  Kasie the areas of radiation.  Include all affected areas.  Just to complete the picture, please draw in the face.     ACHE:  ^ ^ ^   NUMBNESS:  0000   PINS & NEEDLES:  = = = =                              ^ ^ ^                       0000              = = = =                                    ^ ^ ^                       0000            = = = =      BURNING:  XXXX   STABBING: ////                  XXXX                ////                         XXXX          ////     Please kasie the line below indicating your degree of pain right now  with 0 being no pain 10 being the worst pain possible.                                         0             1             2              3             4              5              6              7             8             9             10         Patient Signature:

## (undated) NOTE — ED AVS SNAPSHOT
Aman Fernandes   MRN: [id number]    Department:  THE [facility] Emergency Department in [place]   Date of Visit:  12/8/2019           Disclosure     Insurance plans vary and the physician(s) referred by the ER may not be covered by your plan.  Please conta tell this physician (or your personal doctor if your instructions are to return to your personal doctor) about any new or lasting problems. The primary care or specialist physician will see patients referred from the [facility] Emergency Department.  David Serrato

## (undated) NOTE — ED AVS SNAPSHOT
Farrah De Dios   MRN: [id number]    Department:  [facility] Emergency Department in [place]   Date of Visit:  12/30/2017           Disclosure     Insurance plans vary and the physician(s) referred by the ER may not be covered by your plan.  Please cont tell this physician (or your personal doctor if your instructions are to return to your personal doctor) about any new or lasting problems. The primary care or specialist physician will see patients referred from the [facility] Emergency Department.  Wing Grady

## (undated) NOTE — LETTER
Date: 7/8/2024    Patient Name: María Elena Hurtado    To Whom it may concern:  The above patient was seen at [facility] for treatment of a medical condition.    This patient should be excused from attending work from 7/8/24 through 7/9/24.    Sincerely,      Dee Wood MD

## (undated) NOTE — LETTER
22    [facility]  Pre-Operative Clearance Request    Patient Name:   Garcia Jiang             :   1975    Surgeon: Dr. Minesh Philip             Date of Surgery: 22    Surgical Procedure: Excision of hemangioma on right foot       Please Complete: 2-3 WEEKS PRIOR TO SURGERY TO AVOID CANCELLATION OF SURGERY. [x]  History and Physical        [x]  Medical  Clearance                                                                                                                                                                                          **Please fax test results, H&P, and clearance to [phone] and to    [facility] at [phone]**

## (undated) NOTE — LETTER
24          María Elena Hurtado  :  1975      To Whom It May Concern:    This patient had a procedure done in our office on 24. Patient may return to work. If this office may be of further assistance, please do not hesitate to contact us. [phone]      Sincerely,        Minh Velez MD

## (undated) NOTE — LETTER
10/11/24          María Elena Hurtado  :  1975      To Whom It May Concern:    Please excuse patient from work on 10/11/24 due to a medical appointment.       If this office may be of further assistance, please do not hesitate to contact us.      Sincerely,      Dr. Kyle Raymundo

## (undated) NOTE — ED AVS SNAPSHOT
Pricilla Annaalexis   MRN: [id number]    Department:  [facility] in [place]   Date of Visit:  4/27/2018           Disclosure     Insurance plans vary and the physician(s) referred by the ER may not be covered by your plan.  Please conta tell this physician (or your personal doctor if your instructions are to return to your personal doctor) about any new or lasting problems. The primary care or specialist physician will see patients referred from the [facility] Emergency Department.  Prasad Ceja

## (undated) NOTE — LETTER
[facility], [address] [place] [address]   Date:   2/7/2024     Name:   María Elena Hurtado    YOB: 1975   MRN:   [id number]       WHERE IS YOUR PAIN NOW?  Kasie the areas on your body where you feel the described sensations.  Use the appropriate symbol.  Kasie the areas of radiation.  Include all affected areas.  Just to complete the picture, please draw in the face.     ACHE:  ^ ^ ^   NUMBNESS:  0000   PINS & NEEDLES:  = = = =                              ^ ^ ^                       0000              = = = =                                    ^ ^ ^                       0000            = = = =      BURNING:  XXXX   STABBING: ////                  XXXX                ////                         XXXX          ////     Please kasie the line below indicating your degree of pain right now  with 0 being no pain 10 being the worst pain possible.                                         0             1             2              3             4              5              6              7             8             9             10         Patient Signature:

## (undated) NOTE — ED AVS SNAPSHOT
Kylie Ortiz   MRN: [id number]    Department:  [facility] Emergency Department   Date of Visit:  12/8/2018           Disclosure     Insurance plans vary and the physician(s) referred by the ER may not be covered by your plan.  Please contact yo tell this physician (or your personal doctor if your instructions are to return to your personal doctor) about any new or lasting problems. The primary care or specialist physician will see patients referred from the [facility] Emergency Department.  Kenan Alaniz

## (undated) NOTE — LETTER
Date & Time: 8/7/2018, 11:22 PM  Patient: Jareth Pay  Encounter Provider(s):    Zee Masterson MD       To Whom It May Concern:    Jie Tavares was seen and treated in our department on 8/7/2018. She should not return to work until 08/09/2018.

## (undated) NOTE — ED AVS SNAPSHOT
Severiano Haloestee   MRN: [id number]    Department:  [facility] in [place]   Date of Visit:  8/7/2018           Disclosure     Insurance plans vary and the physician(s) referred by the ER may not be covered by your plan.  Please contac tell this physician (or your personal doctor if your instructions are to return to your personal doctor) about any new or lasting problems. The primary care or specialist physician will see patients referred from the [facility] Emergency Department.  Farrah Pearce